# Patient Record
Sex: MALE | Race: WHITE | Employment: FULL TIME | URBAN - METROPOLITAN AREA
[De-identification: names, ages, dates, MRNs, and addresses within clinical notes are randomized per-mention and may not be internally consistent; named-entity substitution may affect disease eponyms.]

---

## 2017-01-20 PROBLEM — M47.812 CERVICAL SPONDYLOSIS WITHOUT MYELOPATHY: Status: ACTIVE | Noted: 2017-01-20

## 2017-01-20 PROBLEM — M96.1 POSTLAMINECTOMY SYNDROME, LUMBAR REGION: Status: ACTIVE | Noted: 2017-01-20

## 2017-04-06 PROBLEM — M47.817 LUMBOSACRAL SPONDYLOSIS WITHOUT MYELOPATHY: Status: ACTIVE | Noted: 2017-04-06

## 2017-12-02 PROBLEM — M50.30 DEGENERATIVE DISC DISEASE, CERVICAL: Status: ACTIVE | Noted: 2017-12-02

## 2017-12-02 PROBLEM — M48.02 FORAMINAL STENOSIS OF CERVICAL REGION: Status: ACTIVE | Noted: 2017-12-02

## 2021-06-21 DIAGNOSIS — M96.1 POSTLAMINECTOMY SYNDROME, LUMBAR REGION: ICD-10-CM

## 2021-06-21 DIAGNOSIS — M47.812 CERVICAL SPONDYLOSIS WITHOUT MYELOPATHY: ICD-10-CM

## 2021-06-21 DIAGNOSIS — M47.26 OSTEOARTHRITIS OF SPINE WITH RADICULOPATHY, LUMBAR REGION: ICD-10-CM

## 2021-06-21 DIAGNOSIS — G89.4 CHRONIC PAIN SYNDROME: ICD-10-CM

## 2021-06-21 DIAGNOSIS — M51.26 DISPLACEMENT OF LUMBAR INTERVERTEBRAL DISC WITHOUT MYELOPATHY: ICD-10-CM

## 2021-06-21 RX ORDER — GABAPENTIN 600 MG/1
600 TABLET ORAL DAILY
Qty: 90 TABLET | Refills: 0 | Status: SHIPPED | OUTPATIENT
Start: 2021-06-21 | End: 2021-08-20

## 2021-06-28 DIAGNOSIS — G89.4 CHRONIC PAIN SYNDROME: ICD-10-CM

## 2021-06-28 DIAGNOSIS — M47.26 OSTEOARTHRITIS OF SPINE WITH RADICULOPATHY, LUMBAR REGION: ICD-10-CM

## 2021-06-28 DIAGNOSIS — M47.812 CERVICAL SPONDYLOSIS WITHOUT MYELOPATHY: ICD-10-CM

## 2021-06-28 DIAGNOSIS — M51.26 DISPLACEMENT OF LUMBAR INTERVERTEBRAL DISC WITHOUT MYELOPATHY: ICD-10-CM

## 2021-06-28 DIAGNOSIS — M96.1 POSTLAMINECTOMY SYNDROME, LUMBAR REGION: ICD-10-CM

## 2021-06-28 RX ORDER — MELOXICAM 15 MG/1
15 TABLET ORAL DAILY
Qty: 30 TABLET | Refills: 0 | Status: SHIPPED | OUTPATIENT
Start: 2021-06-28 | End: 2021-07-28 | Stop reason: SDUPTHER

## 2021-06-28 RX ORDER — TRAMADOL HYDROCHLORIDE 50 MG/1
50 TABLET ORAL 2 TIMES DAILY PRN
Qty: 60 TABLET | Refills: 0 | Status: SHIPPED | OUTPATIENT
Start: 2021-06-28 | End: 2021-07-28 | Stop reason: SDUPTHER

## 2021-06-28 RX ORDER — OXYCODONE HYDROCHLORIDE AND ACETAMINOPHEN 5; 325 MG/1; MG/1
1 TABLET ORAL EVERY 8 HOURS PRN
Qty: 75 TABLET | Refills: 0 | Status: SHIPPED | OUTPATIENT
Start: 2021-06-28 | End: 2021-07-28 | Stop reason: SDUPTHER

## 2021-07-06 DIAGNOSIS — M96.1 POSTLAMINECTOMY SYNDROME, LUMBAR REGION: ICD-10-CM

## 2021-07-06 DIAGNOSIS — G89.4 CHRONIC PAIN SYNDROME: ICD-10-CM

## 2021-07-06 DIAGNOSIS — M47.812 CERVICAL SPONDYLOSIS WITHOUT MYELOPATHY: ICD-10-CM

## 2021-07-06 DIAGNOSIS — M47.26 OSTEOARTHRITIS OF SPINE WITH RADICULOPATHY, LUMBAR REGION: ICD-10-CM

## 2021-07-06 DIAGNOSIS — M51.26 DISPLACEMENT OF LUMBAR INTERVERTEBRAL DISC WITHOUT MYELOPATHY: ICD-10-CM

## 2021-07-06 RX ORDER — GABAPENTIN 600 MG/1
600 TABLET ORAL DAILY
Qty: 90 TABLET | Refills: 0 | Status: SHIPPED | OUTPATIENT
Start: 2021-07-06 | End: 2021-08-20 | Stop reason: SDUPTHER

## 2021-07-06 NOTE — TELEPHONE ENCOUNTER
Requested Prescriptions     Pending Prescriptions Disp Refills    gabapentin (NEURONTIN) 600 MG tablet 90 tablet 0     Sig: Take 1 tablet by mouth daily for 90 days.        Patient last seen on:  6/30/21  Date of last surgery:  na  Date of last refill:  6/21/21  Pain level:  na  Patient complaining of:  Pt request via [employer]  Future appts: 7/28/21

## 2021-07-06 NOTE — TELEPHONE ENCOUNTER
From: Aziza Stephens  To:  Office of DO Thais  Sent: 6/21/2021 9:05 AM EDT  Subject: Medication Renewal Request    Refills have been requested for the following medications:     gabapentin (NEURONTIN) 600 MG tablet DO Thais]    Preferred pharmacy: [address] [place] [address]

## 2021-07-28 ENCOUNTER — VIRTUAL VISIT (OUTPATIENT)
Dept: PAIN MANAGEMENT | Age: 56
End: 2021-07-28
Payer: COMMERCIAL

## 2021-07-28 DIAGNOSIS — M47.812 CERVICAL SPONDYLOSIS WITHOUT MYELOPATHY: ICD-10-CM

## 2021-07-28 DIAGNOSIS — M96.1 POSTLAMINECTOMY SYNDROME, LUMBAR REGION: ICD-10-CM

## 2021-07-28 DIAGNOSIS — M47.26 OSTEOARTHRITIS OF SPINE WITH RADICULOPATHY, LUMBAR REGION: ICD-10-CM

## 2021-07-28 DIAGNOSIS — M51.26 DISPLACEMENT OF LUMBAR INTERVERTEBRAL DISC WITHOUT MYELOPATHY: ICD-10-CM

## 2021-07-28 DIAGNOSIS — M51.36 DDD (DEGENERATIVE DISC DISEASE), LUMBAR: ICD-10-CM

## 2021-07-28 DIAGNOSIS — G89.4 CHRONIC PAIN SYNDROME: ICD-10-CM

## 2021-07-28 DIAGNOSIS — M47.817 LUMBOSACRAL SPONDYLOSIS WITHOUT MYELOPATHY: Primary | ICD-10-CM

## 2021-07-28 PROCEDURE — 99442 PR PHYS/QHP TELEPHONE EVALUATION 11-20 MIN: CPT | Performed by: PAIN MEDICINE

## 2021-07-28 RX ORDER — OXYCODONE HYDROCHLORIDE AND ACETAMINOPHEN 5; 325 MG/1; MG/1
1 TABLET ORAL EVERY 8 HOURS PRN
Qty: 75 TABLET | Refills: 0 | Status: SHIPPED | OUTPATIENT
Start: 2021-07-28 | End: 2021-08-20 | Stop reason: SDUPTHER

## 2021-07-28 RX ORDER — TRAMADOL HYDROCHLORIDE 50 MG/1
50 TABLET ORAL 2 TIMES DAILY PRN
Qty: 60 TABLET | Refills: 0 | Status: SHIPPED | OUTPATIENT
Start: 2021-07-28 | End: 2021-08-20 | Stop reason: SDUPTHER

## 2021-07-28 RX ORDER — MELOXICAM 15 MG/1
15 TABLET ORAL DAILY
Qty: 30 TABLET | Refills: 0 | Status: SHIPPED | OUTPATIENT
Start: 2021-07-28 | End: 2021-09-13 | Stop reason: SDUPTHER

## 2021-07-28 NOTE — PROGRESS NOTES
Ginette Escalante  (1965)    7/28/2021    TELEHEALTH EVALUATION -- Audio Only (During CWZVX-47 public health emergency)    Due to [place] outbreak, patient's office visit was converted to a virtual visit. Patient was contacted and agreed to proceed with a audio only telehealth service. Patient understands that this encounter is a billable visit and that insurance coverage and co-pays are up to their individual insurance plans. At the beginning of this telehealth encounter, I verified with the patient their name and date of birth. Verbal consent for telehealth encounter was obtained. Subjective:       Chief Complaint   Patient presents with    Back Pain    Neck Pain       Ginette Escalante is 60 y.o. male who complains today of:   Delene Boxer had a telehealth visit today. His back pain did flareup. His primary care provider put him on steroids which are helping. He is also seeing a chiropractor Dr. True Carlson. Patient has chronic pain. History of back in cervical fusions. Pain medicines help the help him work, do his activity living, chores around the house. History of interventional pain management. He does home exercises. He gets massage. Pain varies with overdoing it. Morning time is rough. Sleep can be impaired. No aberrant behavior or side effects with pain medications. Pain can be achy sharp sometimes with spasms. Plan: We discussed options. Continue Percocet, Ultram for his chronic pain. He has Neurontin for nerve pain. We will hold off on injections. We will see him 1 month for follow-up. Question answered, chart reviewed.       Allergies:  Tetanus toxoid    History:  Past Medical History:   Diagnosis Date    Chronic back pain      Past Surgical History:   Procedure Laterality Date    CERVICAL SPINE SURGERY  01/05/2018    SPINE SURGERY  9-2-15    LUMBAR     Family History   Problem Relation Age of Onset    Cancer Mother      Social History     Socioeconomic History    Marital status:  Spouse name: Not on file    Number of children: Not on file    Years of education: Not on file    Highest education level: Not on file   Occupational History    Not on file   Tobacco Use    Smoking status: Never Smoker    Smokeless tobacco: Never Used   Substance and Sexual Activity    Alcohol use: Yes     Alcohol/week: 0.0 standard drinks    Drug use: No    Sexual activity: Not on file   Other Topics Concern    Not on file   Social History Narrative    Not on file     Social Determinants of Health     Financial Resource Strain:     Difficulty of Paying Living Expenses:    Food Insecurity:     Worried About Running Out of Food in the Last Year:     [address] in the Last Year:    Transportation Needs:     Lack of Transportation (Medical):  Lack of Transportation (Non-Medical):    Physical Activity:     Days of Exercise per Week:     Minutes of Exercise per Session:    Stress:     Feeling of Stress :    Social Connections:     Frequency of Communication with Friends and Family:     Frequency of Social Gatherings with Friends and Family:     Attends Synagogue Services:     Active Member of Clubs or Organizations:     Attends Club or Organization Meetings:     Marital Status:    Intimate Partner Violence:     Fear of Current or Ex-Partner:     Emotionally Abused:     Physically Abused:     Sexually Abused:        Current Outpatient Medications on File Prior to Visit   Medication Sig Dispense Refill    gabapentin (NEURONTIN) 600 MG tablet Take 1 tablet by mouth daily for 90 days. 90 tablet 0    gabapentin (NEURONTIN) 600 MG tablet Take 1 tablet by mouth daily for 90 days. 90 tablet 0    naloxone 4 MG/0.1ML LIQD nasal spray 1 spray by Nasal route as needed for Opioid Reversal 1 each 0     No current facility-administered medications on file prior to visit. HPI    Review of Systems    Objective:     Vitals: There were no vitals taken for this visit.      PHYSICAL EXAMINATION:    [x] Alert  [x] Oriented to person/place/time  [x] No apparent distress      [x] Mood and affect normal  [x] Recent and remote memory intact  [x] Judgement and insight normal     [] OTHER:      Due to this being a TeleHealth encounter, evaluation of the following organ systems is limited: Vitals/Constitutional/EENT/Resp/CV/GI//MS/Neuro/Skin/Heme-Lymph-Imm. Assessment:      Diagnosis Orders   1. Lumbosacral spondylosis without myelopathy     2. Chronic pain syndrome  oxyCODONE-acetaminophen (PERCOCET) 5-325 MG per tablet    traMADol (ULTRAM) 50 MG tablet    meloxicam (MOBIC) 15 MG tablet   3. DDD (degenerative disc disease), lumbar     4. Cervical spondylosis without myelopathy  oxyCODONE-acetaminophen (PERCOCET) 5-325 MG per tablet    traMADol (ULTRAM) 50 MG tablet    meloxicam (MOBIC) 15 MG tablet   5. Postlaminectomy syndrome, lumbar region  oxyCODONE-acetaminophen (PERCOCET) 5-325 MG per tablet    traMADol (ULTRAM) 50 MG tablet    meloxicam (MOBIC) 15 MG tablet   6. Osteoarthritis of spine with radiculopathy, lumbar region  oxyCODONE-acetaminophen (PERCOCET) 5-325 MG per tablet    traMADol (ULTRAM) 50 MG tablet    meloxicam (MOBIC) 15 MG tablet   7. Displacement of lumbar intervertebral disc without myelopathy  oxyCODONE-acetaminophen (PERCOCET) 5-325 MG per tablet    traMADol (ULTRAM) 50 MG tablet    meloxicam (MOBIC) 15 MG tablet       Plan:          Orders Placed This Encounter   Medications    oxyCODONE-acetaminophen (PERCOCET) 5-325 MG per tablet     Sig: Take 1 tablet by mouth every 8 hours as needed for Pain for up to 30 days. Dispense:  75 tablet     Refill:  0     Reduce doses taken as pain becomes manageable    traMADol (ULTRAM) 50 MG tablet     Sig: Take 1 tablet by mouth 2 times daily as needed for Pain for up to 30 days.      Dispense:  60 tablet     Refill:  0     Reduce doses taken as pain becomes manageable    meloxicam (MOBIC) 15 MG tablet     Sig: Take 1 tablet by mouth daily TAKE 1 TABLET BY MOUTH EVERY DAY     Dispense:  30 tablet     Refill:  0       No orders of the defined types were placed in this encounter. Follow up:  No follow-ups on file. Charly Hernández DO      >50% of minute appointment was spent with discussion, addressing questions and concerns, including prognosis, treatment options, patient education, and recommendations. [id number]    Pursuant to the emergency declaration under the [address] [place] [address], [facility] waiver authority and the [employer] Act, this Virtual  Visit was conducted, with patient's consent, to reduce the patient's risk of exposure to COVID-19 and provide continuity of care for an established patient. Services were provided through an audio discussion to substitute for in-person clinic visit.

## 2021-08-19 ENCOUNTER — TELEPHONE (OUTPATIENT)
Dept: PAIN MANAGEMENT | Age: 56
End: 2021-08-19

## 2021-08-20 DIAGNOSIS — M51.26 DISPLACEMENT OF LUMBAR INTERVERTEBRAL DISC WITHOUT MYELOPATHY: ICD-10-CM

## 2021-08-20 DIAGNOSIS — M96.1 POSTLAMINECTOMY SYNDROME, LUMBAR REGION: ICD-10-CM

## 2021-08-20 DIAGNOSIS — M47.812 CERVICAL SPONDYLOSIS WITHOUT MYELOPATHY: ICD-10-CM

## 2021-08-20 DIAGNOSIS — G89.4 CHRONIC PAIN SYNDROME: ICD-10-CM

## 2021-08-20 DIAGNOSIS — M47.26 OSTEOARTHRITIS OF SPINE WITH RADICULOPATHY, LUMBAR REGION: ICD-10-CM

## 2021-08-20 RX ORDER — OXYCODONE HYDROCHLORIDE AND ACETAMINOPHEN 5; 325 MG/1; MG/1
1 TABLET ORAL EVERY 8 HOURS PRN
Qty: 75 TABLET | Refills: 0 | Status: SHIPPED | OUTPATIENT
Start: 2021-08-27 | End: 2021-09-23 | Stop reason: SDUPTHER

## 2021-08-20 RX ORDER — TRAMADOL HYDROCHLORIDE 50 MG/1
50 TABLET ORAL 2 TIMES DAILY PRN
Qty: 60 TABLET | Refills: 0 | Status: SHIPPED | OUTPATIENT
Start: 2021-08-27 | End: 2021-09-23 | Stop reason: SDUPTHER

## 2021-08-20 RX ORDER — GABAPENTIN 600 MG/1
600 TABLET ORAL DAILY
Qty: 30 TABLET | Refills: 0 | Status: SHIPPED | OUTPATIENT
Start: 2021-08-20 | End: 2021-09-20 | Stop reason: SDUPTHER

## 2021-08-20 NOTE — TELEPHONE ENCOUNTER
Requested Prescriptions     Pending Prescriptions Disp Refills    oxyCODONE-acetaminophen (PERCOCET) 5-325 MG per tablet 75 tablet 0     Sig: Take 1 tablet by mouth every 8 hours as needed for Pain for up to 30 days.  traMADol (ULTRAM) 50 MG tablet 60 tablet 0     Sig: Take 1 tablet by mouth 2 times daily as needed for Pain for up to 30 days.  gabapentin (NEURONTIN) 600 MG tablet 90 tablet 0     Sig: Take 1 tablet by mouth daily for 90 days.        Patient last seen on:  7/28  Date of last surgery:  n/a  Date of last refill:  7/28  Pain level:  n/a  Patient complaining of:  Pt was r/sed per our office, asking for refill  Future appts: 9/23

## 2021-09-13 DIAGNOSIS — G89.4 CHRONIC PAIN SYNDROME: ICD-10-CM

## 2021-09-13 DIAGNOSIS — M47.26 OSTEOARTHRITIS OF SPINE WITH RADICULOPATHY, LUMBAR REGION: ICD-10-CM

## 2021-09-13 DIAGNOSIS — M47.812 CERVICAL SPONDYLOSIS WITHOUT MYELOPATHY: ICD-10-CM

## 2021-09-13 DIAGNOSIS — M51.26 DISPLACEMENT OF LUMBAR INTERVERTEBRAL DISC WITHOUT MYELOPATHY: ICD-10-CM

## 2021-09-13 DIAGNOSIS — M96.1 POSTLAMINECTOMY SYNDROME, LUMBAR REGION: ICD-10-CM

## 2021-09-13 RX ORDER — MELOXICAM 15 MG/1
15 TABLET ORAL DAILY
Qty: 30 TABLET | Refills: 0 | Status: SHIPPED | OUTPATIENT
Start: 2021-09-13 | End: 2021-10-13 | Stop reason: SDUPTHER

## 2021-09-20 DIAGNOSIS — M96.1 POSTLAMINECTOMY SYNDROME, LUMBAR REGION: ICD-10-CM

## 2021-09-20 DIAGNOSIS — M47.812 CERVICAL SPONDYLOSIS WITHOUT MYELOPATHY: ICD-10-CM

## 2021-09-20 DIAGNOSIS — G89.4 CHRONIC PAIN SYNDROME: ICD-10-CM

## 2021-09-20 DIAGNOSIS — M47.26 OSTEOARTHRITIS OF SPINE WITH RADICULOPATHY, LUMBAR REGION: ICD-10-CM

## 2021-09-20 DIAGNOSIS — M51.26 DISPLACEMENT OF LUMBAR INTERVERTEBRAL DISC WITHOUT MYELOPATHY: ICD-10-CM

## 2021-09-20 RX ORDER — GABAPENTIN 600 MG/1
600 TABLET ORAL DAILY
Qty: 30 TABLET | Refills: 0 | Status: SHIPPED | OUTPATIENT
Start: 2021-09-20 | End: 2022-01-27 | Stop reason: SDUPTHER

## 2021-09-23 ENCOUNTER — OFFICE VISIT (OUTPATIENT)
Dept: PAIN MANAGEMENT | Age: 56
End: 2021-09-23
Payer: COMMERCIAL

## 2021-09-23 VITALS — HEIGHT: 70 IN | BODY MASS INDEX: 24.05 KG/M2 | TEMPERATURE: 97.7 F | WEIGHT: 168 LBS

## 2021-09-23 DIAGNOSIS — G89.4 CHRONIC PAIN SYNDROME: ICD-10-CM

## 2021-09-23 DIAGNOSIS — M47.812 CERVICAL SPONDYLOSIS WITHOUT MYELOPATHY: ICD-10-CM

## 2021-09-23 DIAGNOSIS — M47.26 OSTEOARTHRITIS OF SPINE WITH RADICULOPATHY, LUMBAR REGION: ICD-10-CM

## 2021-09-23 DIAGNOSIS — M96.1 POSTLAMINECTOMY SYNDROME, LUMBAR REGION: ICD-10-CM

## 2021-09-23 PROCEDURE — G8427 DOCREV CUR MEDS BY ELIG CLIN: HCPCS | Performed by: NURSE PRACTITIONER

## 2021-09-23 PROCEDURE — G8420 CALC BMI NORM PARAMETERS: HCPCS | Performed by: NURSE PRACTITIONER

## 2021-09-23 PROCEDURE — 99214 OFFICE O/P EST MOD 30 MIN: CPT | Performed by: NURSE PRACTITIONER

## 2021-09-23 PROCEDURE — 3017F COLORECTAL CA SCREEN DOC REV: CPT | Performed by: NURSE PRACTITIONER

## 2021-09-23 PROCEDURE — 1036F TOBACCO NON-USER: CPT | Performed by: NURSE PRACTITIONER

## 2021-09-23 RX ORDER — OXYCODONE HYDROCHLORIDE AND ACETAMINOPHEN 5; 325 MG/1; MG/1
1 TABLET ORAL EVERY 8 HOURS PRN
Qty: 75 TABLET | Refills: 0 | Status: SHIPPED | OUTPATIENT
Start: 2021-09-26 | End: 2021-10-27 | Stop reason: SDUPTHER

## 2021-09-23 RX ORDER — TRAMADOL HYDROCHLORIDE 50 MG/1
50 TABLET ORAL 2 TIMES DAILY PRN
Qty: 60 TABLET | Refills: 0 | Status: SHIPPED | OUTPATIENT
Start: 2021-09-26 | End: 2021-10-27 | Stop reason: SDUPTHER

## 2021-09-23 ASSESSMENT — ENCOUNTER SYMPTOMS
BACK PAIN: 1
BLOOD IN STOOL: 0
SHORTNESS OF BREATH: 0

## 2021-09-23 NOTE — PROGRESS NOTES
Soren Greenwood  (1965)    9/23/2021    Subjective:     Soren Greenwood is 60 y.o. male who complains today of:    Chief Complaint   Patient presents with    Back Pain     lower left    Leg Pain     left         Allergies:  Tetanus toxoid    Past Medical History:   Diagnosis Date    Chronic back pain      Past Surgical History:   Procedure Laterality Date    CERVICAL SPINE SURGERY  01/05/2018    SPINE SURGERY  9-2-15    LUMBAR     Family History   Problem Relation Age of Onset    Cancer Mother      Social History     Socioeconomic History    Marital status:      Spouse name: Not on file    Number of children: Not on file    Years of education: Not on file    Highest education level: Not on file   Occupational History    Not on file   Tobacco Use    Smoking status: Never Smoker    Smokeless tobacco: Never Used   Substance and Sexual Activity    Alcohol use: Yes     Alcohol/week: 0.0 standard drinks    Drug use: No    Sexual activity: Not on file   Other Topics Concern    Not on file   Social History Narrative    Not on file     Social Determinants of Health     Financial Resource Strain:     Difficulty of Paying Living Expenses:    Food Insecurity:     Worried About Running Out of Food in the Last Year:     [address] in the Last Year:    Transportation Needs:     Lack of Transportation (Medical):      Lack of Transportation (Non-Medical):    Physical Activity:     Days of Exercise per Week:     Minutes of Exercise per Session:    Stress:     Feeling of Stress :    Social Connections:     Frequency of Communication with Friends and Family:     Frequency of Social Gatherings with Friends and Family:     Attends [employer] Services:     Active Member of Clubs or Organizations:     Attends Club or Organization Meetings:     Marital Status:    Intimate Partner Violence:     Fear of Current or Ex-Partner:     Emotionally Abused:     Physically Abused:     Sexually Abused: treatment. , Random urine drug screen sent today. (DELBERT Melara - CNP)    Orders Placed This Encounter   Medications    oxyCODONE-acetaminophen (PERCOCET) 5-325 MG per tablet     Sig: Take 1 tablet by mouth every 8 hours as needed for Pain for up to 30 days. Dispense:  75 tablet     Refill:  0     Reduce doses taken as pain becomes manageable    traMADol (ULTRAM) 50 MG tablet     Sig: Take 1 tablet by mouth 2 times daily as needed for Pain for up to 30 days. Fill 9/26/21     Dispense:  60 tablet     Refill:  0     Reduce doses taken as pain becomes manageable       Orders Placed This Encounter   Procedures    Urine Drug Screen     Chronic pain/illicits     Discussed options with the patient today. No injections needed at this time. Seeing Dr. Grace Quispe for hand/arm pain and injections. He would like to get set up with a new neurosurgeon in the near future. Will sign updated NDP. He will call in for partial refill of gabapentin in a few weeks to get meds due on same date. Routine UDS. Took 1 Percocet last night and 1 Tramadol today. All questions were answered. Pt verbalized understanding and agrees with above plan. Narcan sent 5/14/21. Will continue medications for chronic pain as they help pt function with ADL and improve quality of life. Discussed possible risks of opiate medication with pt, including but not limited to, constipation, nausea or vomiting, sedation, urinary retention, dependence and possible addiction. Pt agrees to use medication as directed. Pt advised to not use opiates while driving or operating heavy equipment, or in situations where pt may harm him/herself or others. Pt is aware that while on narcotics, pt needs to be seen monthly to reassess pain and need for continued medication. NDP reviewed. OARRS was reviewed. This NP saw pt under direct supervision of Dr. Minda Guardado.        Follow up:  Return in about 4 weeks (around 10/21/2021) for review meds and reassess pain.    Maria Del Carmen Loaiza, APRN - CNP

## 2021-10-13 DIAGNOSIS — M47.26 OSTEOARTHRITIS OF SPINE WITH RADICULOPATHY, LUMBAR REGION: ICD-10-CM

## 2021-10-13 DIAGNOSIS — G89.4 CHRONIC PAIN SYNDROME: ICD-10-CM

## 2021-10-13 DIAGNOSIS — M96.1 POSTLAMINECTOMY SYNDROME, LUMBAR REGION: ICD-10-CM

## 2021-10-13 DIAGNOSIS — M51.26 DISPLACEMENT OF LUMBAR INTERVERTEBRAL DISC WITHOUT MYELOPATHY: ICD-10-CM

## 2021-10-13 DIAGNOSIS — M47.812 CERVICAL SPONDYLOSIS WITHOUT MYELOPATHY: ICD-10-CM

## 2021-10-13 RX ORDER — MELOXICAM 15 MG/1
15 TABLET ORAL DAILY
Qty: 13 TABLET | Refills: 0 | Status: SHIPPED | OUTPATIENT
Start: 2021-10-13 | End: 2021-10-27 | Stop reason: SDUPTHER

## 2021-10-27 DIAGNOSIS — M47.812 CERVICAL SPONDYLOSIS WITHOUT MYELOPATHY: ICD-10-CM

## 2021-10-27 DIAGNOSIS — M47.26 OSTEOARTHRITIS OF SPINE WITH RADICULOPATHY, LUMBAR REGION: ICD-10-CM

## 2021-10-27 DIAGNOSIS — G89.4 CHRONIC PAIN SYNDROME: ICD-10-CM

## 2021-10-27 DIAGNOSIS — M51.26 DISPLACEMENT OF LUMBAR INTERVERTEBRAL DISC WITHOUT MYELOPATHY: ICD-10-CM

## 2021-10-27 DIAGNOSIS — M96.1 POSTLAMINECTOMY SYNDROME, LUMBAR REGION: ICD-10-CM

## 2021-10-27 NOTE — TELEPHONE ENCOUNTER
Needs to be seen - needs f/u appointment - appears missed last appointment? . Send refill request to [facility]

## 2021-10-28 RX ORDER — TRAMADOL HYDROCHLORIDE 50 MG/1
50 TABLET ORAL 2 TIMES DAILY PRN
Qty: 60 TABLET | Refills: 0 | Status: SHIPPED | OUTPATIENT
Start: 2021-10-28 | End: 2021-11-18 | Stop reason: SDUPTHER

## 2021-10-28 RX ORDER — OXYCODONE HYDROCHLORIDE AND ACETAMINOPHEN 5; 325 MG/1; MG/1
1 TABLET ORAL EVERY 8 HOURS PRN
Qty: 75 TABLET | Refills: 0 | Status: SHIPPED | OUTPATIENT
Start: 2021-10-28 | End: 2021-11-18 | Stop reason: SDUPTHER

## 2021-10-28 RX ORDER — MELOXICAM 15 MG/1
15 TABLET ORAL DAILY
Qty: 13 TABLET | Refills: 0 | Status: SHIPPED | OUTPATIENT
Start: 2021-10-28 | End: 2021-11-11 | Stop reason: SDUPTHER

## 2021-11-11 DIAGNOSIS — M96.1 POSTLAMINECTOMY SYNDROME, LUMBAR REGION: ICD-10-CM

## 2021-11-11 DIAGNOSIS — M47.812 CERVICAL SPONDYLOSIS WITHOUT MYELOPATHY: ICD-10-CM

## 2021-11-11 DIAGNOSIS — M51.26 DISPLACEMENT OF LUMBAR INTERVERTEBRAL DISC WITHOUT MYELOPATHY: ICD-10-CM

## 2021-11-11 DIAGNOSIS — M47.26 OSTEOARTHRITIS OF SPINE WITH RADICULOPATHY, LUMBAR REGION: ICD-10-CM

## 2021-11-11 DIAGNOSIS — G89.4 CHRONIC PAIN SYNDROME: ICD-10-CM

## 2021-11-11 RX ORDER — MELOXICAM 15 MG/1
15 TABLET ORAL DAILY
Qty: 30 TABLET | Refills: 2 | Status: SHIPPED | OUTPATIENT
Start: 2021-11-11 | End: 2022-01-27

## 2021-11-18 ENCOUNTER — VIRTUAL VISIT (OUTPATIENT)
Dept: PAIN MANAGEMENT | Age: 56
End: 2021-11-18
Payer: COMMERCIAL

## 2021-11-18 DIAGNOSIS — M51.36 DDD (DEGENERATIVE DISC DISEASE), LUMBAR: ICD-10-CM

## 2021-11-18 DIAGNOSIS — M47.812 CERVICAL SPONDYLOSIS WITHOUT MYELOPATHY: ICD-10-CM

## 2021-11-18 DIAGNOSIS — M51.26 DISPLACEMENT OF LUMBAR INTERVERTEBRAL DISC WITHOUT MYELOPATHY: ICD-10-CM

## 2021-11-18 DIAGNOSIS — G89.4 CHRONIC PAIN SYNDROME: Primary | ICD-10-CM

## 2021-11-18 DIAGNOSIS — M47.26 OSTEOARTHRITIS OF SPINE WITH RADICULOPATHY, LUMBAR REGION: ICD-10-CM

## 2021-11-18 DIAGNOSIS — M96.1 POSTLAMINECTOMY SYNDROME, LUMBAR REGION: ICD-10-CM

## 2021-11-18 DIAGNOSIS — M47.817 LUMBOSACRAL SPONDYLOSIS WITHOUT MYELOPATHY: ICD-10-CM

## 2021-11-18 PROCEDURE — 99442 PR PHYS/QHP TELEPHONE EVALUATION 11-20 MIN: CPT | Performed by: PAIN MEDICINE

## 2021-11-18 RX ORDER — TRAMADOL HYDROCHLORIDE 50 MG/1
50 TABLET ORAL 2 TIMES DAILY PRN
Qty: 60 TABLET | Refills: 0 | Status: SHIPPED | OUTPATIENT
Start: 2021-11-18 | End: 2021-12-30 | Stop reason: SDUPTHER

## 2021-11-18 RX ORDER — OXYCODONE HYDROCHLORIDE AND ACETAMINOPHEN 5; 325 MG/1; MG/1
1 TABLET ORAL EVERY 8 HOURS PRN
Qty: 75 TABLET | Refills: 0 | Status: SHIPPED | OUTPATIENT
Start: 2021-11-18 | End: 2021-12-30 | Stop reason: SDUPTHER

## 2021-11-18 RX ORDER — TIZANIDINE 4 MG/1
4 TABLET ORAL 2 TIMES DAILY PRN
Qty: 60 TABLET | Refills: 2 | Status: SHIPPED | OUTPATIENT
Start: 2021-11-18 | End: 2022-02-16

## 2021-11-18 NOTE — PROGRESS NOTES
Problem Relation Age of Onset    Cancer Mother      Social History     Socioeconomic History    Marital status:      Spouse name: Not on file    Number of children: Not on file    Years of education: Not on file    Highest education level: Not on file   Occupational History    Not on file   Tobacco Use    Smoking status: Never Smoker    Smokeless tobacco: Never Used   Substance and Sexual Activity    Alcohol use: Yes     Alcohol/week: 0.0 standard drinks    Drug use: No    Sexual activity: Not on file   Other Topics Concern    Not on file   Social History Narrative    Not on file     Social Determinants of Health     Financial Resource Strain:     Difficulty of Paying Living Expenses: Not on file   Food Insecurity:     Worried About Running Out of Food in the Last Year: Not on file    [place] [employer] in the Last Year: Not on file   Transportation Needs:     Lack of Transportation (Medical): Not on file    Lack of Transportation (Non-Medical):  Not on file   Physical Activity:     Days of Exercise per Week: Not on file    Minutes of Exercise per Session: Not on file   Stress:     Feeling of Stress : Not on file   Social Connections:     Frequency of Communication with Friends and Family: Not on file    Frequency of Social Gatherings with Friends and Family: Not on file    Attends Baptism Services: Not on file    Active Member of [address] [place] [address] [employer] or Organizations: Not on file    Attends Club or Organization Meetings: Not on file    Marital Status: Not on file   Intimate Partner Violence:     Fear of Current or Ex-Partner: Not on file    Emotionally Abused: Not on file    Physically Abused: Not on file    Sexually Abused: Not on file   Housing Stability:     Unable to Pay for Housing in the Last Year: Not on file    Number of [place] in the Last Year: Not on file    Unstable Housing in the Last Year: Not on file       Current Outpatient Medications on File Prior to Visit Medication Sig Dispense Refill    meloxicam (MOBIC) 15 MG tablet Take 1 tablet by mouth daily TAKE 1 TABLET BY MOUTH EVERY DAY 30 tablet 2    gabapentin (NEURONTIN) 600 MG tablet Take 1 tablet by mouth daily for 30 days. 30 tablet 0    naloxone 4 MG/0.1ML LIQD nasal spray 1 spray by Nasal route as needed for Opioid Reversal 1 each 0     No current facility-administered medications on file prior to visit. HPI    Review of Systems    Objective:     Vitals: There were no vitals taken for this visit. PHYSICAL EXAMINATION:    [x] Alert  [x] Oriented to person/place/time  [x] No apparent distress      [x] Mood and affect normal  [x] Recent and remote memory intact  [x] Judgement and insight normal     [] OTHER:      Due to this being a TeleHealth encounter, evaluation of the following organ systems is limited: Vitals/Constitutional/EENT/Resp/CV/GI//MS/Neuro/Skin/Heme-Lymph-Imm. Assessment:      Diagnosis Orders   1. Chronic pain syndrome  oxyCODONE-acetaminophen (PERCOCET) 5-325 MG per tablet    traMADol (ULTRAM) 50 MG tablet    tiZANidine (ZANAFLEX) 4 MG tablet   2. Lumbosacral spondylosis without myelopathy     3. Cervical spondylosis without myelopathy  oxyCODONE-acetaminophen (PERCOCET) 5-325 MG per tablet    traMADol (ULTRAM) 50 MG tablet    tiZANidine (ZANAFLEX) 4 MG tablet   4. DDD (degenerative disc disease), lumbar     5. Postlaminectomy syndrome, lumbar region  oxyCODONE-acetaminophen (PERCOCET) 5-325 MG per tablet    traMADol (ULTRAM) 50 MG tablet    tiZANidine (ZANAFLEX) 4 MG tablet   6. Osteoarthritis of spine with radiculopathy, lumbar region  oxyCODONE-acetaminophen (PERCOCET) 5-325 MG per tablet    traMADol (ULTRAM) 50 MG tablet    tiZANidine (ZANAFLEX) 4 MG tablet   7.  Displacement of lumbar intervertebral disc without myelopathy  tiZANidine (ZANAFLEX) 4 MG tablet       Plan:          Orders Placed This Encounter   Medications    oxyCODONE-acetaminophen (PERCOCET) 5-325 MG per tablet     Sig: Take 1 tablet by mouth every 8 hours as needed for Pain for up to 30 days. Dispense:  75 tablet     Refill:  0     Reduce doses taken as pain becomes manageable    traMADol (ULTRAM) 50 MG tablet     Sig: Take 1 tablet by mouth 2 times daily as needed for Pain for up to 30 days. Fill 9/26/21     Dispense:  60 tablet     Refill:  0     Reduce doses taken as pain becomes manageable    tiZANidine (ZANAFLEX) 4 MG tablet     Sig: Take 1 tablet by mouth 2 times daily as needed (spasms)     Dispense:  60 tablet     Refill:  2       No orders of the defined types were placed in this encounter. Follow up:  No follow-ups on file. Elsa Bellamy DO      >50% of minute appointment was spent with discussion, addressing questions and concerns, including prognosis, treatment options, patient education, and recommendations. [id number]    Pursuant to the emergency declaration under the [facility], [facility] waiver authority and the [employer] Act, this Virtual  Visit was conducted, with patient's consent, to reduce the patient's risk of exposure to COVID-19 and provide continuity of care for an established patient. Services were provided through an audio discussion to substitute for in-person clinic visit.

## 2021-12-02 ENCOUNTER — PROCEDURE VISIT (OUTPATIENT)
Dept: PAIN MANAGEMENT | Age: 56
End: 2021-12-02
Payer: COMMERCIAL

## 2021-12-02 DIAGNOSIS — M47.817 LUMBOSACRAL SPONDYLOSIS WITHOUT MYELOPATHY: Primary | ICD-10-CM

## 2021-12-02 PROCEDURE — 64493 INJ PARAVERT F JNT L/S 1 LEV: CPT | Performed by: PAIN MEDICINE

## 2021-12-02 PROCEDURE — 64494 INJ PARAVERT F JNT L/S 2 LEV: CPT | Performed by: PAIN MEDICINE

## 2021-12-02 RX ORDER — LIDOCAINE HYDROCHLORIDE 10 MG/ML
10 INJECTION, SOLUTION EPIDURAL; INFILTRATION; INTRACAUDAL; PERINEURAL ONCE
Status: COMPLETED | OUTPATIENT
Start: 2021-12-02 | End: 2021-12-02

## 2021-12-02 RX ORDER — BETAMETHASONE SODIUM PHOSPHATE AND BETAMETHASONE ACETATE 3; 3 MG/ML; MG/ML
6 INJECTION, SUSPENSION INTRA-ARTICULAR; INTRALESIONAL; INTRAMUSCULAR; SOFT TISSUE ONCE
Status: COMPLETED | OUTPATIENT
Start: 2021-12-02 | End: 2021-12-02

## 2021-12-02 RX ADMIN — BETAMETHASONE SODIUM PHOSPHATE AND BETAMETHASONE ACETATE 6 MG: 3; 3 INJECTION, SUSPENSION INTRA-ARTICULAR; INTRALESIONAL; INTRAMUSCULAR; SOFT TISSUE at 08:46

## 2021-12-02 RX ADMIN — LIDOCAINE HYDROCHLORIDE 10 MG: 10 INJECTION, SOLUTION EPIDURAL; INFILTRATION; INTRACAUDAL; PERINEURAL at 08:45

## 2021-12-02 NOTE — PROGRESS NOTES
[facility]  Neurosurgery and Pain [address], [address] [facility] , [address] [facility], [place]: [phone]  F: [phone]       LUMBAR FACET JOINT INJECTION       Provider: Pat Navas DO          Patient Name: Prateek Ward : 1965        Date: 2021        Prateek Ward is here today for interventional pain management. SLR negative. Tender along lumber facet joints with pain and decreased ROM. Extension and rotation with muscle spasms. Preoperatively, the patient presents with facet joint mediated pain as per history and exam. Patient has failed NSAIDs, PT, and conservative treatment. Patient has significant psychological and functional impairment due to these conditions. Standard ASIPP guidelines were followed and sterile technique used. Area was cleaned with Betadine x3. Informed consent was obtained. Fluoroscopic guidance was used for this procedure. Appropriate oblique views were used to open up the facet joints. Appropriate length spinal needle was advanced to each facet joint. Negative aspiration was achieved. Approximately 6mg Celestone was divided equally and 0.5 cc of 1% preservative free Lidocaine was injected in the joints injected without difficulty. Patient tolerated the procedure well, no obvious complications occurred during the procedure. Patient was appropriately monitored and discharged home in stable condition with their usual motor strength. Post Op instructions were given to patient. Patient told to resume blood thinners if they stopped them prior to procedure. Relevent and recent imaging reviewed with patient today.            [x] Bilateral [] T12-L1       [] L1-2      [] Right [] L2-3       [] L3-4      [] Left [x] L4-5         [x] L5-S1                           Physician: Pat Navas DO

## 2021-12-29 ENCOUNTER — PATIENT MESSAGE (OUTPATIENT)
Dept: PAIN MANAGEMENT | Age: 56
End: 2021-12-29

## 2021-12-29 DIAGNOSIS — G89.4 CHRONIC PAIN SYNDROME: ICD-10-CM

## 2021-12-29 DIAGNOSIS — M47.26 OSTEOARTHRITIS OF SPINE WITH RADICULOPATHY, LUMBAR REGION: ICD-10-CM

## 2021-12-29 DIAGNOSIS — M47.812 CERVICAL SPONDYLOSIS WITHOUT MYELOPATHY: ICD-10-CM

## 2021-12-29 DIAGNOSIS — M96.1 POSTLAMINECTOMY SYNDROME, LUMBAR REGION: ICD-10-CM

## 2021-12-29 NOTE — TELEPHONE ENCOUNTER
From: Praveen Cage  To: Dr. Seo Self: 12/29/2021 11:14 AM EST  Subject: Meds    good morning  My appointment was cancelled, which is ok, but my meds were not filled.   Tramadol and percocet and I am out  Thanks  [facility]

## 2021-12-29 NOTE — TELEPHONE ENCOUNTER
Requested Prescriptions     Pending Prescriptions Disp Refills    traMADol (ULTRAM) 50 MG tablet 60 tablet 0     Sig: Take 1 tablet by mouth 2 times daily as needed for Pain for up to 30 days. Fill 9/26/21    oxyCODONE-acetaminophen (PERCOCET) 5-325 MG per tablet 75 tablet 0     Sig: Take 1 tablet by mouth every 8 hours as needed for Pain for up to 30 days. Patient last seen on:  12/16/21  Date of last surgery:  NA  Date of last refill:  11/18/21  Pain level:  NA  Patient complaining of:  PT REQUEST VIA ebindle  Future appts: NO APPT.

## 2021-12-30 RX ORDER — OXYCODONE HYDROCHLORIDE AND ACETAMINOPHEN 5; 325 MG/1; MG/1
1 TABLET ORAL EVERY 8 HOURS PRN
Qty: 75 TABLET | Refills: 0 | Status: SHIPPED | OUTPATIENT
Start: 2021-12-30 | End: 2022-01-27 | Stop reason: SDUPTHER

## 2021-12-30 RX ORDER — TRAMADOL HYDROCHLORIDE 50 MG/1
50 TABLET ORAL 2 TIMES DAILY PRN
Qty: 60 TABLET | Refills: 0 | Status: SHIPPED | OUTPATIENT
Start: 2021-12-30 | End: 2022-01-27 | Stop reason: SDUPTHER

## 2022-01-27 ENCOUNTER — OFFICE VISIT (OUTPATIENT)
Dept: PAIN MANAGEMENT | Age: 57
End: 2022-01-27
Payer: COMMERCIAL

## 2022-01-27 VITALS
TEMPERATURE: 96.7 F | DIASTOLIC BLOOD PRESSURE: 79 MMHG | HEART RATE: 88 BPM | SYSTOLIC BLOOD PRESSURE: 116 MMHG | HEIGHT: 70 IN | BODY MASS INDEX: 24.05 KG/M2 | WEIGHT: 168 LBS

## 2022-01-27 DIAGNOSIS — M47.812 CERVICAL SPONDYLOSIS WITHOUT MYELOPATHY: ICD-10-CM

## 2022-01-27 DIAGNOSIS — M47.817 LUMBOSACRAL SPONDYLOSIS WITHOUT MYELOPATHY: Primary | ICD-10-CM

## 2022-01-27 DIAGNOSIS — M96.1 POSTLAMINECTOMY SYNDROME, LUMBAR REGION: ICD-10-CM

## 2022-01-27 DIAGNOSIS — G89.4 CHRONIC PAIN SYNDROME: ICD-10-CM

## 2022-01-27 PROCEDURE — 1036F TOBACCO NON-USER: CPT | Performed by: NURSE PRACTITIONER

## 2022-01-27 PROCEDURE — G8420 CALC BMI NORM PARAMETERS: HCPCS | Performed by: NURSE PRACTITIONER

## 2022-01-27 PROCEDURE — G8427 DOCREV CUR MEDS BY ELIG CLIN: HCPCS | Performed by: NURSE PRACTITIONER

## 2022-01-27 PROCEDURE — 3017F COLORECTAL CA SCREEN DOC REV: CPT | Performed by: NURSE PRACTITIONER

## 2022-01-27 PROCEDURE — G8484 FLU IMMUNIZE NO ADMIN: HCPCS | Performed by: NURSE PRACTITIONER

## 2022-01-27 PROCEDURE — 99214 OFFICE O/P EST MOD 30 MIN: CPT | Performed by: NURSE PRACTITIONER

## 2022-01-27 RX ORDER — GABAPENTIN 600 MG/1
600 TABLET ORAL DAILY
Qty: 19 TABLET | Refills: 0 | Status: SHIPPED | OUTPATIENT
Start: 2022-02-09 | End: 2022-02-21 | Stop reason: SDUPTHER

## 2022-01-27 RX ORDER — OXYCODONE HYDROCHLORIDE AND ACETAMINOPHEN 5; 325 MG/1; MG/1
1 TABLET ORAL EVERY 8 HOURS PRN
Qty: 75 TABLET | Refills: 0 | Status: SHIPPED | OUTPATIENT
Start: 2022-01-29 | End: 2022-02-21 | Stop reason: SDUPTHER

## 2022-01-27 RX ORDER — TRAMADOL HYDROCHLORIDE 50 MG/1
50 TABLET ORAL 2 TIMES DAILY PRN
Qty: 60 TABLET | Refills: 0 | Status: SHIPPED | OUTPATIENT
Start: 2022-01-29 | End: 2022-02-21 | Stop reason: SDUPTHER

## 2022-01-27 ASSESSMENT — ENCOUNTER SYMPTOMS
BLOOD IN STOOL: 0
SHORTNESS OF BREATH: 0
BACK PAIN: 1

## 2022-01-27 NOTE — PROGRESS NOTES
Raina Rogers  (1965)    1/27/2022    Subjective:     Raina Rogers is 60 y.o. male who complains today of:    Chief Complaint   Patient presents with    Back Pain     Lower    Medication Refill         Allergies:  Tetanus toxoid    Past Medical History:   Diagnosis Date    Chronic back pain      Past Surgical History:   Procedure Laterality Date    CERVICAL SPINE SURGERY  01/05/2018    SPINE SURGERY  9-2-15    LUMBAR     Family History   Problem Relation Age of Onset    Cancer Mother      Social History     Socioeconomic History    Marital status:      Spouse name: Not on file    Number of children: Not on file    Years of education: Not on file    Highest education level: Not on file   Occupational History    Not on file   Tobacco Use    Smoking status: Never Smoker    Smokeless tobacco: Never Used   Substance and Sexual Activity    Alcohol use: Yes     Alcohol/week: 0.0 standard drinks    Drug use: No    Sexual activity: Not on file   Other Topics Concern    Not on file   Social History Narrative    Not on file     Social Determinants of Health     Financial Resource Strain:     Difficulty of Paying Living Expenses: Not on file   Food Insecurity:     Worried About Running Out of Food in the Last Year: Not on file    Hugo of Food in the Last Year: Not on file   Transportation Needs:     Lack of Transportation (Medical): Not on file    Lack of Transportation (Non-Medical):  Not on file   Physical Activity:     Days of Exercise per Week: Not on file    Minutes of Exercise per Session: Not on file   Stress:     Feeling of Stress : Not on file   Social Connections:     Frequency of Communication with Friends and Family: Not on file    Frequency of Social Gatherings with Friends and Family: Not on file    Attends [employer] Services: Not on file    Active Member of Clubs or Organizations: Not on file    Attends Club or Organization Meetings: Not on file    Marital Status: Not on file   Intimate Partner Violence:     Fear of Current or Ex-Partner: Not on file    Emotionally Abused: Not on file    Physically Abused: Not on file    Sexually Abused: Not on file   Housing Stability:     Unable to Pay for Housing in the Last Year: Not on file    Number of [place] in the Last Year: Not on file    Unstable Housing in the Last Year: Not on file       Current Outpatient Medications on File Prior to Visit   Medication Sig Dispense Refill    tiZANidine (ZANAFLEX) 4 MG tablet Take 1 tablet by mouth 2 times daily as needed (spasms) 60 tablet 2    naloxone 4 MG/0.1ML LIQD nasal spray 1 spray by Nasal route as needed for Opioid Reversal 1 each 0     No current facility-administered medications on file prior to visit. Pt presents today for a f/u of chronic neck and low back pain. He had a left middle finger fusion on 12/8 but having a long healing process. He stopped taking the Meloxicam per Dr. Karen Posey. The lumbar injections did not help as much as in the past.  Pt feels pain level and functioning improves with prescribed medications and is able to perform ADLs. Pt feels that prolonged sitting aggravates the pain. He was seeing chiropractor 2x/week for 6 weeks then stopped going, feels it helped. Pt c/o LLE radiating numbness and tingling down to the foot, worse when sitting too long. ACDF C4-5, C5-6 with Dr Parveen Mcduffie on 1/5/18. He has a history of multiple back surgeries and fusion at L2-3. He gets neuropathy worse at night. L CTR with Dr. Karen Posey January 2020. Taking Percocet, tramadol, Neurontin for chronic pain. 12/2/21 BL L4-5, 5-S1 facet inj  4/1/2021 bilateral L3-4, 4-5, 5-S1 facet injection  11/2/20 BL L3-4, 4-5, 5-S1 facet inj  7/20/20 BL L4-5 MARCIA  5/12/20 BL L3-4, 4-5, 5-S1 facet inj  1/20/20 L L3-4, 4-5, 5-S1 facet inj  1/17/20 L shoulder inj      Review of Systems   Constitutional: Negative for appetite change and fever. HENT: Negative for hearing loss. Eyes: Negative for visual disturbance. Respiratory: Negative for shortness of breath. Gastrointestinal: Negative for blood in stool. Genitourinary: Negative for difficulty urinating and hematuria. Musculoskeletal: Positive for arthralgias, back pain and neck pain. Skin: Negative for rash. Neurological: Negative for facial asymmetry. Hematological: Negative for adenopathy. Psychiatric/Behavioral: Negative for self-injury. All other systems reviewed and are negative. Objective:     Vitals:  /79   Pulse 88   Temp 96.7 °F (35.9 °C)   Ht 5' 10\" (1.778 m)   Wt 168 lb (76.2 kg)   BMI 24.11 kg/m² Pain Score:   2      Physical Exam  Vitals and nursing note reviewed. Pt is alert and oriented x 3. Recent and remote memory is intact. Mood, judgement and affect are normal.  No signs of distress or SOB noted. Visualized skin intact. Sensation intact to light touch. Decreased ROM with flexion and extension of low back. Tender with palpation to bilateral lumbar spine with positive provacative maneuvers noted. Negative SLR. Strength, balance, and coordination are functional for ambulation. Decreased ROM with flexion, extension and rotation of cervical spine. Muscle tightness noted. Positive bilateral cervical facet joint provacative with palpation. Negative Spurling's maneuver. Negative Deon's sign. Strong grasp B/L. Strength is functional in UE bilaterally. Pulses are intact. Patient guarding left hand in gauze wrap. Assessment:      Diagnosis Orders   1. Lumbosacral spondylosis without myelopathy  MS INJ DX/THER AGNT PARAVERT FACET JOINT, LUMBAR/SAC, 1ST LEVEL    MS INJ DX/THER AGNT PARAVERT FACET JOINT, LUMBAR/SAC, 2ND LEVEL    MS INJ DX/THER AGNT PARAVERT FACET JOINT, LUMBAR/SAC, ADD LEVEL   2. Chronic pain syndrome  traMADol (ULTRAM) 50 MG tablet    oxyCODONE-acetaminophen (PERCOCET) 5-325 MG per tablet    gabapentin (NEURONTIN) 600 MG tablet   3.  Cervical spondylosis without myelopathy  traMADol (ULTRAM) 50 MG tablet    oxyCODONE-acetaminophen (PERCOCET) 5-325 MG per tablet    gabapentin (NEURONTIN) 600 MG tablet   4. Postlaminectomy syndrome, lumbar region  traMADol (ULTRAM) 50 MG tablet    oxyCODONE-acetaminophen (PERCOCET) 5-325 MG per tablet    gabapentin (NEURONTIN) 600 MG tablet       Plan:     Periodic Controlled Substance Monitoring: Possible medication side effects, risk of tolerance/dependence & alternative treatments discussed. ,No signs of potential drug abuse or diversion identified. ,Assessed functional status. ,Obtaining appropriate analgesic effect of treatment. Keon Germainamento Suit, APRN - CNP)    Orders Placed This Encounter   Medications    traMADol (ULTRAM) 50 MG tablet     Sig: Take 1 tablet by mouth 2 times daily as needed for Pain for up to 30 days. Fill 1/29/22     Dispense:  60 tablet     Refill:  0     Reduce doses taken as pain becomes manageable    oxyCODONE-acetaminophen (PERCOCET) 5-325 MG per tablet     Sig: Take 1 tablet by mouth every 8 hours as needed for Pain for up to 30 days. Dispense:  75 tablet     Refill:  0     Reduce doses taken as pain becomes manageable    gabapentin (NEURONTIN) 600 MG tablet     Sig: Take 1 tablet by mouth daily for 19 days. Dispense:  19 tablet     Refill:  0       Orders Placed This Encounter   Procedures    RI INJ DX/THER AGNT PARAVERT FACET JOINT, LUMBAR/SAC, 1ST LEVEL     Standing Status:   Future     Standing Expiration Date:   4/27/2022    RI INJ DX/THER AGNT PARAVERT FACET JOINT, LUMBAR/SAC, 2ND LEVEL     Standing Status:   Future     Standing Expiration Date:   4/27/2022    RI INJ DX/THER AGNT PARAVERT FACET JOINT, LUMBAR/SAC, ADD LEVEL     BL L345 MBB with SK. (fusion L2-3). Standing Status:   Future     Standing Expiration Date:   4/27/2022     Discussed options with the patient today. Continue following with Dr. Jason Conley for left hand post op care. No Meloxicam at this time.    He will call in for partial refill of gabapentin in a few weeks to get meds due on same date. He would like to retry RFA which has helped in the remote past.  We will proceed with lumbar MBB's. All questions were answered. Pt verbalized understanding and agrees with above plan. UDS reviewed and appropriate from 9/23/21. Narcan sent 5/14/21. We will go ahead and order diagnostic bilateral L3, L4, L5 medial branch blocks to see if the patient is a candidate for RF ablation. he has failed conservative treatment in the past. Anatomic model of pathology was shown. Risks and benefits of the procedure were discussed. All questions were answered and patient understands and agrees with the plan.         Will continue medications for chronic pain as they help pt function with ADL and improve quality of life.  Discussed possible risks of opiate medication with pt, including but not limited to, constipation, nausea or vomiting, sedation, urinary retention, dependence and possible addiction. Pt agrees to use medication as directed. Pt advised to not use opiates while driving or operating heavy equipment, or in situations where pt may harm him/herself or others.  Pt is aware that while on narcotics, pt needs to be seen monthly to reassess pain and need for continued medication. NDP reviewed. OARRS was reviewed. This NP saw pt under direct supervision of Dr. Ellen Alfredo. Follow up:  Return in about 4 weeks (around 2/24/2022) for review meds and reassess pain.     Michael Marie, APRN - CNP

## 2022-02-01 ENCOUNTER — PROCEDURE VISIT (OUTPATIENT)
Dept: PAIN MANAGEMENT | Age: 57
End: 2022-02-01
Payer: COMMERCIAL

## 2022-02-01 DIAGNOSIS — M47.817 LUMBOSACRAL SPONDYLOSIS WITHOUT MYELOPATHY: ICD-10-CM

## 2022-02-01 PROCEDURE — 64494 INJ PARAVERT F JNT L/S 2 LEV: CPT | Performed by: PAIN MEDICINE

## 2022-02-01 PROCEDURE — 64493 INJ PARAVERT F JNT L/S 1 LEV: CPT | Performed by: PAIN MEDICINE

## 2022-02-01 RX ORDER — LIDOCAINE HYDROCHLORIDE 10 MG/ML
10 INJECTION, SOLUTION EPIDURAL; INFILTRATION; INTRACAUDAL; PERINEURAL ONCE
Status: COMPLETED | OUTPATIENT
Start: 2022-02-01 | End: 2022-02-01

## 2022-02-01 RX ADMIN — LIDOCAINE HYDROCHLORIDE 10 MG: 10 INJECTION, SOLUTION EPIDURAL; INFILTRATION; INTRACAUDAL; PERINEURAL at 08:46

## 2022-02-01 NOTE — PROGRESS NOTES
[facility]) Physicians  Neurosurgery and Pain [facility]  [address], [address] [facility] , [address] [facility], [place]: [phone]  F: [phone]      [address]      Provider: Papi Mcdowell DO          Patient Name: Garo Arambula : 1965        Date: 2022       Garo Arambula is here today for interventional pain management. Standard ASIPP guidelines were followed and sterile technique used. Area was cleaned with Betadine x3. Informed consent was obtained. Fluoroscopic guidance was used for this procedure. Junction of superior articular process and transverse process was identified. For L5 dorsal primary ramus groove of sacral ala and SAP of S1 was identified. Appropriate length spinal needle was used and advanced to correct anatomic location. Negative aspiration was achieved. At each site approximately 1/2cc of 1% preservative free Lidocaine was injected without difficulty. Patient tolerated the procedure well, no obvious complications occurred during the procedure. Patient was appropriately monitored and discharged home in stable condition with their usual motor strength. The patient will keep close track of pain over the next several hours and call our office tomorrow and let us know what percentage of pain relief is experienced. Post op Instructions were given to patient. Relevant and recent imaging reviewed with patient today. [x] Bilateral [] T12 [] S1      [] L1 [] S2     [] Right [] L2 [] S3      [x] L3      [] Left [x] L4         [x] L5 [] S. I. Patient had >80% pain relief following procedure with positive facet joint provocative maneuvers, schedule RF ablation.     Papi Mcdowell DO

## 2022-02-07 ENCOUNTER — OFFICE VISIT (OUTPATIENT)
Dept: PAIN MANAGEMENT | Age: 57
End: 2022-02-07
Payer: COMMERCIAL

## 2022-02-07 DIAGNOSIS — M47.817 LUMBOSACRAL SPONDYLOSIS WITHOUT MYELOPATHY: ICD-10-CM

## 2022-02-07 PROCEDURE — 64636 DESTROY L/S FACET JNT ADDL: CPT | Performed by: PAIN MEDICINE

## 2022-02-07 PROCEDURE — 64635 DESTROY LUMB/SAC FACET JNT: CPT | Performed by: PAIN MEDICINE

## 2022-02-07 RX ORDER — LIDOCAINE HYDROCHLORIDE 10 MG/ML
10 INJECTION, SOLUTION EPIDURAL; INFILTRATION; INTRACAUDAL; PERINEURAL ONCE
Status: COMPLETED | OUTPATIENT
Start: 2022-02-07 | End: 2022-02-07

## 2022-02-07 RX ORDER — BETAMETHASONE SODIUM PHOSPHATE AND BETAMETHASONE ACETATE 3; 3 MG/ML; MG/ML
6 INJECTION, SUSPENSION INTRA-ARTICULAR; INTRALESIONAL; INTRAMUSCULAR; SOFT TISSUE ONCE
Status: COMPLETED | OUTPATIENT
Start: 2022-02-07 | End: 2022-02-07

## 2022-02-07 RX ADMIN — LIDOCAINE HYDROCHLORIDE 10 MG: 10 INJECTION, SOLUTION EPIDURAL; INFILTRATION; INTRACAUDAL; PERINEURAL at 09:42

## 2022-02-07 RX ADMIN — BETAMETHASONE SODIUM PHOSPHATE AND BETAMETHASONE ACETATE 6 MG: 3; 3 INJECTION, SUSPENSION INTRA-ARTICULAR; INTRALESIONAL; INTRAMUSCULAR; SOFT TISSUE at 09:42

## 2022-02-07 NOTE — PROGRESS NOTES
[facility]) Physicians  Neurosurgery and Pain [address] [facility] [place]: [phone]  F: [phone]      Lumbar Radio Frequency Ablation     Provider: Janette Fernández DO          Patient Name: Laura Levy : 1965        Date: 2022      Laura Levy is here today for interventional pain management. Standard ASIPP guidelines were followed and sterile technique used. Area was cleaned with Betadine x3. Informed consent was obtained. Fluoroscopic guidance was used for this procedure. Multiple views of fluoroscopy were used during procedure to assist with needle placement. Appropriate sized RF 10mm active tip needle was used and advance to appropriate anatomic location. There was appropriate multifidus contraction noted with motor stimulation at 2 Hz between 0.5-1.5 volts. No limb or gluteal contraction was noted taking it up to 3.5 volts. Prior to lesioning at 80 degrees Celsius for 90 seconds, approximately 0.75mg/1mg of Celestone and ½ cc of 1% preservative free Lidocaine was injected. Impedance was between 200-500 ohms during the procedure. Patient tolerated the procedure well, no obvious complications occurred during the procedure. Patient was appropriately monitored and discharged home in stable condition with their usual motor strength. Post Op instructions were given to patient.           [] Bilateral [] T11 [] L1 [] S1     [] T12 [] L2 [] S2    [] Right  [x] L3 [] S3      [x] L4 [] S4    [x] Left  [x] L5                              Janette Fernández DO

## 2022-02-16 ENCOUNTER — TELEPHONE (OUTPATIENT)
Dept: PAIN MANAGEMENT | Age: 57
End: 2022-02-16

## 2022-02-16 NOTE — TELEPHONE ENCOUNTER
BENEFITS: RT L3,4,5 RFA    Insurance: MMO  Phone: [phone]  Contact Name: Noe Higueran  Effective Date: 9.1.2021     Plan year: YES-CALENDAR  Deductible:  0.00      Deductible Met: 0.00  Allowed/benefits paid at: 100% AFTER $15.00 COPAY  OOP: NO OOP MAXIMUM  Freq Limits: 55612 & 83120--3  DAYS  Prior Auth Requirement: NO AUTH REQUIRED    Notes: NO PRE-EX LCAUSE    Call Reference #: [id number]    Time of call: 7:55AM

## 2022-02-21 ENCOUNTER — OFFICE VISIT (OUTPATIENT)
Dept: PAIN MANAGEMENT | Age: 57
End: 2022-02-21
Payer: COMMERCIAL

## 2022-02-21 DIAGNOSIS — M96.1 POSTLAMINECTOMY SYNDROME, LUMBAR REGION: ICD-10-CM

## 2022-02-21 DIAGNOSIS — G89.4 CHRONIC PAIN SYNDROME: ICD-10-CM

## 2022-02-21 DIAGNOSIS — M47.817 LUMBOSACRAL SPONDYLOSIS WITHOUT MYELOPATHY: Primary | ICD-10-CM

## 2022-02-21 DIAGNOSIS — M47.812 CERVICAL SPONDYLOSIS WITHOUT MYELOPATHY: ICD-10-CM

## 2022-02-21 PROCEDURE — 64635 DESTROY LUMB/SAC FACET JNT: CPT | Performed by: PAIN MEDICINE

## 2022-02-21 PROCEDURE — 64636 DESTROY L/S FACET JNT ADDL: CPT | Performed by: PAIN MEDICINE

## 2022-02-21 RX ORDER — GABAPENTIN 600 MG/1
600 TABLET ORAL DAILY
Qty: 19 TABLET | Refills: 0 | Status: SHIPPED | OUTPATIENT
Start: 2022-02-21 | End: 2022-03-24 | Stop reason: SDUPTHER

## 2022-02-21 RX ORDER — OXYCODONE HYDROCHLORIDE AND ACETAMINOPHEN 5; 325 MG/1; MG/1
1 TABLET ORAL EVERY 8 HOURS PRN
Qty: 75 TABLET | Refills: 0 | Status: SHIPPED | OUTPATIENT
Start: 2022-02-21 | End: 2022-03-24 | Stop reason: SDUPTHER

## 2022-02-21 RX ORDER — LIDOCAINE HYDROCHLORIDE 10 MG/ML
10 INJECTION, SOLUTION EPIDURAL; INFILTRATION; INTRACAUDAL; PERINEURAL ONCE
Status: COMPLETED | OUTPATIENT
Start: 2022-02-21 | End: 2022-02-21

## 2022-02-21 RX ORDER — TRAMADOL HYDROCHLORIDE 50 MG/1
50 TABLET ORAL 2 TIMES DAILY PRN
Qty: 60 TABLET | Refills: 0 | Status: SHIPPED | OUTPATIENT
Start: 2022-02-21 | End: 2022-03-24 | Stop reason: SDUPTHER

## 2022-02-21 RX ORDER — BETAMETHASONE SODIUM PHOSPHATE AND BETAMETHASONE ACETATE 3; 3 MG/ML; MG/ML
6 INJECTION, SUSPENSION INTRA-ARTICULAR; INTRALESIONAL; INTRAMUSCULAR; SOFT TISSUE ONCE
Status: COMPLETED | OUTPATIENT
Start: 2022-02-21 | End: 2022-02-21

## 2022-02-21 RX ADMIN — LIDOCAINE HYDROCHLORIDE 10 MG: 10 INJECTION, SOLUTION EPIDURAL; INFILTRATION; INTRACAUDAL; PERINEURAL at 09:17

## 2022-02-21 RX ADMIN — BETAMETHASONE SODIUM PHOSPHATE AND BETAMETHASONE ACETATE 6 MG: 3; 3 INJECTION, SUSPENSION INTRA-ARTICULAR; INTRALESIONAL; INTRAMUSCULAR; SOFT TISSUE at 09:18

## 2022-02-21 NOTE — PROGRESS NOTES
[facility]) Physicians  Neurosurgery and Pain [address] [facility] [place]: [phone]  F: [phone]      Lumbar Radio Frequency Ablation     Provider: Sole Gates DO          Patient Name: Brenda Grant : 1965        Date: 2022      Brenda Grant is here today for interventional pain management. Standard ASIPP guidelines were followed and sterile technique used. Area was cleaned with Betadine x3. Informed consent was obtained. Fluoroscopic guidance was used for this procedure. Multiple views of fluoroscopy were used during procedure to assist with needle placement. Appropriate sized RF 10mm active tip needle was used and advance to appropriate anatomic location. There was appropriate multifidus contraction noted with motor stimulation at 2 Hz between 0.5-1.5 volts. No limb or gluteal contraction was noted taking it up to 3.5 volts. Prior to lesioning at 80 degrees Celsius for 90 seconds, approximately 0.75mg/1mg of Celestone and ½ cc of 1% preservative free Lidocaine was injected. Impedance was between 200-500 ohms during the procedure. Patient tolerated the procedure well, no obvious complications occurred during the procedure. Patient was appropriately monitored and discharged home in stable condition with their usual motor strength. Post Op instructions were given to patient.           [] Bilateral [] T11 [] L1 [] S1     [] T12 [] L2 [] S2    [x] Right  [x] L3 [] S3      [x] L4 [] S4    [] Left  [x] L5                              Sole Gates DO

## 2022-02-24 ENCOUNTER — HOSPITAL ENCOUNTER (OUTPATIENT)
Dept: CT IMAGING | Age: 57
Discharge: HOME | End: 2022-02-26
Payer: COMMERCIAL

## 2022-02-24 DIAGNOSIS — M19.042 ARTHRITIS OF LEFT HAND: ICD-10-CM

## 2022-02-24 PROCEDURE — 73200 CT UPPER EXTREMITY W/O DYE: CPT

## 2022-03-04 ENCOUNTER — ANESTHESIA EVENT (OUTPATIENT)
Dept: OPERATING ROOM | Age: 57
End: 2022-03-04
Payer: COMMERCIAL

## 2022-03-04 ENCOUNTER — ANESTHESIA (OUTPATIENT)
Dept: OPERATING ROOM | Age: 57
End: 2022-03-04
Payer: COMMERCIAL

## 2022-03-04 ENCOUNTER — HOSPITAL ENCOUNTER (OUTPATIENT)
Dept: GENERAL RADIOLOGY | Age: 57
Setting detail: OUTPATIENT SURGERY
Discharge: HOME | End: 2022-03-06
Attending: ORTHOPAEDIC SURGERY
Payer: COMMERCIAL

## 2022-03-04 ENCOUNTER — HOSPITAL ENCOUNTER (OUTPATIENT)
Age: 57
Setting detail: OUTPATIENT SURGERY
Discharge: HOME | End: 2022-03-04
Attending: ORTHOPAEDIC SURGERY | Admitting: ORTHOPAEDIC SURGERY
Payer: COMMERCIAL

## 2022-03-04 VITALS — SYSTOLIC BLOOD PRESSURE: 112 MMHG | OXYGEN SATURATION: 100 % | DIASTOLIC BLOOD PRESSURE: 72 MMHG

## 2022-03-04 VITALS
OXYGEN SATURATION: 96 % | SYSTOLIC BLOOD PRESSURE: 126 MMHG | HEART RATE: 84 BPM | WEIGHT: 170 LBS | HEIGHT: 70 IN | TEMPERATURE: 97.7 F | BODY MASS INDEX: 24.34 KG/M2 | DIASTOLIC BLOOD PRESSURE: 74 MMHG | RESPIRATION RATE: 18 BRPM

## 2022-03-04 DIAGNOSIS — R52 PAIN: ICD-10-CM

## 2022-03-04 LAB
ALBUMIN SERPL-MCNC: 4.1 G/DL (ref 3.5–4.6)
ALP BLD-CCNC: 96 U/L (ref 35–104)
ALT SERPL-CCNC: 32 U/L (ref 0–41)
ANION GAP SERPL CALCULATED.3IONS-SCNC: 13 MEQ/L (ref 9–15)
AST SERPL-CCNC: 44 U/L (ref 0–40)
BASOPHILS ABSOLUTE: 0.1 K/UL (ref 0–0.2)
BASOPHILS RELATIVE PERCENT: 1.4 %
BILIRUB SERPL-MCNC: 0.6 MG/DL (ref 0.2–0.7)
BILIRUBIN URINE: NEGATIVE
BLOOD, URINE: NEGATIVE
BUN BLDV-MCNC: 15 MG/DL (ref 6–20)
CALCIUM SERPL-MCNC: 9.1 MG/DL (ref 8.5–9.9)
CHLORIDE BLD-SCNC: 103 MEQ/L (ref 95–107)
CLARITY: CLEAR
CO2: 25 MEQ/L (ref 20–31)
COLOR: YELLOW
CREAT SERPL-MCNC: 0.8 MG/DL (ref 0.7–1.2)
EOSINOPHILS ABSOLUTE: 0.3 K/UL (ref 0–0.7)
EOSINOPHILS RELATIVE PERCENT: 5.9 %
GFR AFRICAN AMERICAN: >60
GFR NON-AFRICAN AMERICAN: >60
GLOBULIN: 2.4 G/DL (ref 2.3–3.5)
GLUCOSE BLD-MCNC: 91 MG/DL (ref 70–99)
GLUCOSE URINE: NEGATIVE MG/DL
HCT VFR BLD CALC: 42.4 % (ref 42–52)
HEMOGLOBIN: 14.5 G/DL (ref 14–18)
INR BLD: 1.1
KETONES, URINE: ABNORMAL MG/DL
LEUKOCYTE ESTERASE, URINE: NEGATIVE
LYMPHOCYTES ABSOLUTE: 2 K/UL (ref 1–4.8)
LYMPHOCYTES RELATIVE PERCENT: 36.1 %
MCH RBC QN AUTO: 33.6 PG (ref 27–31.3)
MCHC RBC AUTO-ENTMCNC: 34.1 % (ref 33–37)
MCV RBC AUTO: 98.5 FL (ref 80–100)
MONOCYTES ABSOLUTE: 0.5 K/UL (ref 0.2–0.8)
MONOCYTES RELATIVE PERCENT: 9.3 %
NEUTROPHILS ABSOLUTE: 2.6 K/UL (ref 1.4–6.5)
NEUTROPHILS RELATIVE PERCENT: 47.3 %
NITRITE, URINE: NEGATIVE
PDW BLD-RTO: 13.9 % (ref 11.5–14.5)
PH UA: 5.5 (ref 5–9)
PLATELET # BLD: 241 K/UL (ref 130–400)
POTASSIUM SERPL-SCNC: 3.8 MEQ/L (ref 3.4–4.9)
PROTEIN UA: NEGATIVE MG/DL
PROTHROMBIN TIME: 13.9 SEC (ref 12.3–14.9)
RBC # BLD: 4.3 M/UL (ref 4.7–6.1)
SODIUM BLD-SCNC: 141 MEQ/L (ref 135–144)
SPECIFIC GRAVITY UA: 1.02 (ref 1–1.03)
TOTAL PROTEIN: 6.5 G/DL (ref 6.3–8)
URINE REFLEX TO CULTURE: ABNORMAL
UROBILINOGEN, URINE: 0.2 E.U./DL
WBC # BLD: 5.6 K/UL (ref 4.8–10.8)

## 2022-03-04 PROCEDURE — 85025 COMPLETE CBC W/AUTO DIFF WBC: CPT

## 2022-03-04 PROCEDURE — A4217 STERILE WATER/SALINE, 500 ML: HCPCS | Performed by: ORTHOPAEDIC SURGERY

## 2022-03-04 PROCEDURE — 93005 ELECTROCARDIOGRAM TRACING: CPT | Performed by: ORTHOPAEDIC SURGERY

## 2022-03-04 PROCEDURE — 81003 URINALYSIS AUTO W/O SCOPE: CPT

## 2022-03-04 PROCEDURE — C1713 ANCHOR/SCREW BN/BN,TIS/BN: HCPCS | Performed by: ORTHOPAEDIC SURGERY

## 2022-03-04 PROCEDURE — 87075 CULTR BACTERIA EXCEPT BLOOD: CPT

## 2022-03-04 PROCEDURE — 7100000010 HC PHASE II RECOVERY - FIRST 15 MIN: Performed by: ORTHOPAEDIC SURGERY

## 2022-03-04 PROCEDURE — 2580000003 HC RX 258: Performed by: ORTHOPAEDIC SURGERY

## 2022-03-04 PROCEDURE — 3700000001 HC ADD 15 MINUTES (ANESTHESIA): Performed by: ORTHOPAEDIC SURGERY

## 2022-03-04 PROCEDURE — 85610 PROTHROMBIN TIME: CPT

## 2022-03-04 PROCEDURE — 7100000011 HC PHASE II RECOVERY - ADDTL 15 MIN: Performed by: ORTHOPAEDIC SURGERY

## 2022-03-04 PROCEDURE — 80053 COMPREHEN METABOLIC PANEL: CPT

## 2022-03-04 PROCEDURE — 2709999900 HC NON-CHARGEABLE SUPPLY: Performed by: ORTHOPAEDIC SURGERY

## 2022-03-04 PROCEDURE — 87070 CULTURE OTHR SPECIMN AEROBIC: CPT

## 2022-03-04 PROCEDURE — 6360000002 HC RX W HCPCS: Performed by: STUDENT IN AN ORGANIZED HEALTH CARE EDUCATION/TRAINING PROGRAM

## 2022-03-04 PROCEDURE — 87176 TISSUE HOMOGENIZATION CULTR: CPT

## 2022-03-04 PROCEDURE — 2580000003 HC RX 258: Performed by: STUDENT IN AN ORGANIZED HEALTH CARE EDUCATION/TRAINING PROGRAM

## 2022-03-04 PROCEDURE — 3600000004 HC SURGERY LEVEL 4 BASE: Performed by: ORTHOPAEDIC SURGERY

## 2022-03-04 PROCEDURE — 2580000003 HC RX 258: Performed by: NURSE ANESTHETIST, CERTIFIED REGISTERED

## 2022-03-04 PROCEDURE — 64415 NJX AA&/STRD BRCH PLXS IMG: CPT | Performed by: STUDENT IN AN ORGANIZED HEALTH CARE EDUCATION/TRAINING PROGRAM

## 2022-03-04 PROCEDURE — 3600000014 HC SURGERY LEVEL 4 ADDTL 15MIN: Performed by: ORTHOPAEDIC SURGERY

## 2022-03-04 PROCEDURE — 3700000000 HC ANESTHESIA ATTENDED CARE: Performed by: ORTHOPAEDIC SURGERY

## 2022-03-04 PROCEDURE — 3209999900 FLUORO FOR SURGICAL PROCEDURES

## 2022-03-04 PROCEDURE — 6360000002 HC RX W HCPCS: Performed by: ORTHOPAEDIC SURGERY

## 2022-03-04 DEVICE — GRAFT BNE SUB 5ML 1.7-10MM CANC CHIP MORSELIZED FRZ DRY: Type: IMPLANTABLE DEVICE | Site: ARM | Status: FUNCTIONAL

## 2022-03-04 RX ORDER — MIDAZOLAM HYDROCHLORIDE 1 MG/ML
INJECTION INTRAMUSCULAR; INTRAVENOUS PRN
Status: DISCONTINUED | OUTPATIENT
Start: 2022-03-04 | End: 2022-03-04 | Stop reason: SDUPTHER

## 2022-03-04 RX ORDER — SODIUM CHLORIDE, SODIUM LACTATE, POTASSIUM CHLORIDE, CALCIUM CHLORIDE 600; 310; 30; 20 MG/100ML; MG/100ML; MG/100ML; MG/100ML
INJECTION, SOLUTION INTRAVENOUS CONTINUOUS PRN
Status: DISCONTINUED | OUTPATIENT
Start: 2022-03-04 | End: 2022-03-04 | Stop reason: SDUPTHER

## 2022-03-04 RX ORDER — MELOXICAM 15 MG/1
15 TABLET ORAL DAILY
COMMUNITY
Start: 2015-11-02

## 2022-03-04 RX ORDER — SODIUM CHLORIDE, SODIUM LACTATE, POTASSIUM CHLORIDE, CALCIUM CHLORIDE 600; 310; 30; 20 MG/100ML; MG/100ML; MG/100ML; MG/100ML
INJECTION, SOLUTION INTRAVENOUS CONTINUOUS
Status: DISCONTINUED | OUTPATIENT
Start: 2022-03-04 | End: 2022-03-04 | Stop reason: HOSPADM

## 2022-03-04 RX ORDER — CYCLOBENZAPRINE HCL 10 MG
10 TABLET ORAL NIGHTLY
COMMUNITY
Start: 2022-02-02

## 2022-03-04 RX ORDER — PROPOFOL 10 MG/ML
INJECTION, EMULSION INTRAVENOUS CONTINUOUS PRN
Status: DISCONTINUED | OUTPATIENT
Start: 2022-03-04 | End: 2022-03-04 | Stop reason: SDUPTHER

## 2022-03-04 RX ORDER — MAGNESIUM HYDROXIDE 1200 MG/15ML
LIQUID ORAL CONTINUOUS PRN
Status: COMPLETED | OUTPATIENT
Start: 2022-03-04 | End: 2022-03-04

## 2022-03-04 RX ORDER — PITAVASTATIN CALCIUM 2.09 MG/1
2 TABLET, FILM COATED ORAL NIGHTLY
COMMUNITY
Start: 2022-02-09

## 2022-03-04 RX ADMIN — SODIUM CHLORIDE, POTASSIUM CHLORIDE, SODIUM LACTATE AND CALCIUM CHLORIDE: 600; 310; 30; 20 INJECTION, SOLUTION INTRAVENOUS at 09:13

## 2022-03-04 RX ADMIN — PROPOFOL 130 MCG/KG/MIN: 10 INJECTION, EMULSION INTRAVENOUS at 09:18

## 2022-03-04 RX ADMIN — CEFAZOLIN 2000 MG: 10 INJECTION, POWDER, FOR SOLUTION INTRAVENOUS at 09:13

## 2022-03-04 RX ADMIN — MIDAZOLAM HYDROCHLORIDE 2 MG: 1 INJECTION, SOLUTION INTRAMUSCULAR; INTRAVENOUS at 08:54

## 2022-03-04 ASSESSMENT — PULMONARY FUNCTION TESTS
PIF_VALUE: 0
PIF_VALUE: 1
PIF_VALUE: 0
PIF_VALUE: 0
PIF_VALUE: 1
PIF_VALUE: 0
PIF_VALUE: 0
PIF_VALUE: 1
PIF_VALUE: 0
PIF_VALUE: 0
PIF_VALUE: 1
PIF_VALUE: 0
PIF_VALUE: 1
PIF_VALUE: 0
PIF_VALUE: 1
PIF_VALUE: 0
PIF_VALUE: 0
PIF_VALUE: 1
PIF_VALUE: 1
PIF_VALUE: 0
PIF_VALUE: 1
PIF_VALUE: 0
PIF_VALUE: 1
PIF_VALUE: 1
PIF_VALUE: 0
PIF_VALUE: 1
PIF_VALUE: 0
PIF_VALUE: 1
PIF_VALUE: 0
PIF_VALUE: 1
PIF_VALUE: 0
PIF_VALUE: 1
PIF_VALUE: 0
PIF_VALUE: 1
PIF_VALUE: 0
PIF_VALUE: 1
PIF_VALUE: 1
PIF_VALUE: 0
PIF_VALUE: 1
PIF_VALUE: 0
PIF_VALUE: 1
PIF_VALUE: 0
PIF_VALUE: 1
PIF_VALUE: 0
PIF_VALUE: 1
PIF_VALUE: 0
PIF_VALUE: 1
PIF_VALUE: 0
PIF_VALUE: 1
PIF_VALUE: 0
PIF_VALUE: 1
PIF_VALUE: 1
PIF_VALUE: 0
PIF_VALUE: 1
PIF_VALUE: 0
PIF_VALUE: 1
PIF_VALUE: 0
PIF_VALUE: 1
PIF_VALUE: 0
PIF_VALUE: 1
PIF_VALUE: 1
PIF_VALUE: 0
PIF_VALUE: 0
PIF_VALUE: 1
PIF_VALUE: 0
PIF_VALUE: 0

## 2022-03-04 ASSESSMENT — PAIN SCALES - GENERAL: PAINLEVEL_OUTOF10: 0

## 2022-03-04 NOTE — OP NOTE
Operative Note      Patient: Clara Setvenson  YOB: 1965  MRN: [id number]    Date of Procedure: 3/4/2022      Preoperative diagnosis: Failure of fusion left third MCP    Postoperative diagnosis: Same     Procedure planned: Debridement of left third MCP fusion site with application of autogenous distal radius bone graft. Application of allograft cancellous bone to distal radius donor site. Procedure performed: Same    Surgeon: BRANDYN Guy PA  The physician assistant was present through the entire case. Given the nature of the disease process and the procedure to be performed a skilled surgical assistant was necessary during the case. The assistant was necessary in order to hold retractors and directly assist in the operation. A certified scrub tech was at the back table managing instruments and supplies for the surgical case. 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 He experienced failure of nonoperative treatment strategies. He underwent a standard fusion procedure. The patient showed failure of fusion approximately 12 weeks status post surgery. Treatment options were discussed. He elected to proceed forth with revision of the construct with curettage to the fusion plane with application of distal radius bone graft. Informed consent was signed and placed in the chart. Complications: None noted at the time of surgery     Description of operation: The patient was taken to the operative suite and placed in the supine position on the operating table. A timeout was performed and the left hand confirmed to be the operative site.   He was carefully positioned on the table in such a fashion as to pad all bony prominences and peripheral nerves. He was administered appropriate IV antibiotics and general anesthesia. He was prepped and draped in the normal sterile fashion. The tourniquet was elevated. The 15 blade was used to incise skin using the previous skin incision scar. Dissection was carried sharply through the subcutaneous plane elevating a full-thickness subcutaneous flap off of the extensor mechanism. The extensor mechanism was divided in its midline and the fusion plane exposed after dividing the capsular remnant. Stressing was undertaken. The fusion appeared stable to clinical inspection however fluoroscopically it was that there was no bone bridging. The rongeur and curette were used to debride the intended zone of arthrodesis exposing cancellous bone that was healthy in appearance to the metacarpal head and the P1 base. Approximately 1 L of normal sterile saline was then passed through the fusion construct. The 15 blade was then used to incise skin just ulnar to Breezy's tubercle. The third dorsal compartment was identified and opened and the EPL tendon retracted radially. The rongeur was used to decorticate Breezy's tubercle. A fine osteotome was used to harvest a block of cancellous bone which measured approximately 1.25 cm x 7 mm x 7 mm. Additional cancellous bone was harvested from the distal radius. The loose cancellous bone was then packed into the floor of the intended fusion construct. The cancellous block harvested from the distal radius was sectioned to allow for a portion to be applied to the more ulnar portion of the fusion construct and a portion to be applied to the more radial portion of the fusion construct. A tamp was used to push the cancellous graft into position both radially and ulnarly. We then densely packed additional loose cancellous bone into the fusion construct.   The hardware was notably stable and while revision was considered it was deemed most appropriate to retain the hardware as revision of that hardware could lead to its own complications. We did harvest some fibrinous tissue from the zone of fusion and sent it for culture on the off chance that this failure of fusion was related to an infection. There was no clinical evidence to support infection. Allograft bone was packed densely into the donor site. 2-0 Vicryl suture was used to repair the dorsal wrist extensor retinaculum leaving the EPL tendon superficial to the retinacular closure. The tourniquet was relieved and hemostasis confirmed. Wounds were closed in a layered fashion. Bulky soft dressing and splint were placed. The patient was allowed to arise from anesthesia and taken to recovery in stable condition. Overall he tolerated the procedure well. Disposition: Stable to PACU            Specimens:   ID Type Source Tests Collected by Time Destination   1 : LEFT 3RD METACARPAL  Tissue Hand CULTURE, SURGICAL Laron Hunt DO 3/4/2022 [id number]        Implants:  Implant Name Type Inv.  Item Serial No.  Lot No. LRB No. Used Action   GRAFT BNE SUB 5ML 1.7-10MM CANC CHIP MORSELIZED FRZ DRY - [id number]  GRAFT BNE SUB 5ML 1.7-10MM CANC CHIP MORSELIZED FRZ DRY [id number] [employer] [id number] Left 1 Implanted           Electronically signed by Nancy Hayes DO on 3/4/2022 at 11:58 AM

## 2022-03-04 NOTE — PROGRESS NOTES
Discharge instructions reviewed with pt and wife, both verb understanding, paper script for pain med given also, pt has no c/o pain sling in place and ice to ext

## 2022-03-04 NOTE — ANESTHESIA PRE PROCEDURE
Department of Anesthesiology  Preprocedure Note       Name:  Olga Singh   Age:  60 y.o.  :  1965                                          MRN:  [id number]         Date:  3/4/2022      Surgeon: Lien Caldwell):  Leanna Valentino DO    Procedure: Procedure(s):  LEFT REVISION LEFT LONG FINGER METACARPOPHALANGEAL FUSION WITH DISTAL RADIUS AUTOGENOUS BONE GRAFTING SUPINE; C-ARM; SIM Digital HAND SET (LABS ON ADMIT)    Medications prior to admission:   Prior to Admission medications    Medication Sig Start Date End Date Taking? Authorizing Provider   traMADol (ULTRAM) 50 MG tablet Take 1 tablet by mouth 2 times daily as needed for Pain for up to 30 days. Fill 1/29/22 2/21/22 3/23/22  Mariam Homans, DO   oxyCODONE-acetaminophen (PERCOCET) 5-325 MG per tablet Take 1 tablet by mouth every 8 hours as needed for Pain for up to 30 days. 2/21/22 3/23/22  Mariam Homans, DO   gabapentin (NEURONTIN) 600 MG tablet Take 1 tablet by mouth daily for 19 days. 2/21/22 3/12/22  Mariam Homans, DO   naloxone 4 MG/0.1ML LIQD nasal spray 1 spray by Nasal route as needed for Opioid Reversal 21   Anjelica Del Real MD       Current medications:    Current Facility-Administered Medications   Medication Dose Route Frequency Provider Last Rate Last Admin    ceFAZolin (ANCEF) 2000 mg in dextrose 5 % 100 mL IVPB  2,000 mg IntraVENous On Call to Steve Avendano DO           Allergies:     Allergies   Allergen Reactions    Tetanus Toxoid        Problem List:    Patient Active Problem List   Diagnosis Code    Osteoarthritis of spine with radiculopathy, lumbar region M47.26    Displacement of lumbar intervertebral disc without myelopathy M51.26    Lumbar stenosis M48.061    Primary osteoarthritis of right hip M16.11    Cervical spondylosis without myelopathy M47.812    Postlaminectomy syndrome, lumbar region M96.1    Lumbosacral spondylosis without myelopathy M47.817    Degenerative disc disease, cervical M50.30    Foraminal stenosis of cervical region M48.02       Past Medical History:        Diagnosis Date    Chronic back pain        Past Surgical History:        Procedure Laterality Date    CERVICAL SPINE SURGERY  01/05/2018    SPINE SURGERY  9-2-15    LUMBAR       Social History:    Social History     Tobacco Use    Smoking status: Never Smoker    Smokeless tobacco: Never Used   Substance Use Topics    Alcohol use: Yes     Alcohol/week: 0.0 standard drinks                                Counseling given: Not Answered      Vital Signs (Current): There were no vitals filed for this visit. BP Readings from Last 3 Encounters:   01/27/22 116/79   12/02/17 117/80   11/16/16 (!) 148/80       NPO Status:  8+ hours                                                                               BMI:   Wt Readings from Last 3 Encounters:   01/27/22 168 lb (76.2 kg)   10/21/21 170 lb (77.1 kg)   09/23/21 168 lb (76.2 kg)     There is no height or weight on file to calculate BMI.    CBC:   Lab Results   Component Value Date    WBC 10.6 08/31/2015    RBC 4.43 08/31/2015    HGB 14.2 08/31/2015    HCT 43.0 08/31/2015    MCV 97.0 08/31/2015    RDW 14.0 08/31/2015     08/31/2015       CMP:   Lab Results   Component Value Date     08/31/2015    K 4.0 08/31/2015     08/31/2015    CO2 29 08/31/2015    BUN 19 08/31/2015    CREATININE 0.72 08/31/2015    GFRAA >60.0 08/31/2015    LABGLOM >60.0 08/31/2015    GLUCOSE 81 08/31/2015    CALCIUM 9.8 08/31/2015       POC Tests: No results for input(s): POCGLU, POCNA, POCK, POCCL, POCBUN, POCHEMO, POCHCT in the last 72 hours.     Coags:   Lab Results   Component Value Date    PROTIME 10.3 08/31/2015    INR 1.0 08/31/2015    APTT 23.8 08/31/2015       HCG (If Applicable): No results found for: PREGTESTUR, PREGSERUM, HCG, HCGQUANT     ABGs: No results found for: PHART, PO2ART, EZO4ECE, VKI8LTC, BEART, O5IRSKIS     Type & Screen (If Applicable):  No results found for: LABABO, LABRH    Drug/Infectious Status (If Applicable):  No results found for: HIV, HEPCAB    COVID-19 Screening (If Applicable): No results found for: COVID19        Anesthesia Evaluation  Patient summary reviewed and Nursing notes reviewed no history of anesthetic complications:   Airway: Mallampati: II  TM distance: >3 FB   Neck ROM: full  Mouth opening: > = 3 FB Dental: normal exam         Pulmonary:Negative Pulmonary ROS and normal exam                               Cardiovascular:Negative CV ROS  Exercise tolerance: good (>4 METS),         ECG reviewed               Beta Blocker:  Not on Beta Blocker         Neuro/Psych:   Negative Neuro/Psych ROS              GI/Hepatic/Renal: Neg GI/Hepatic/Renal ROS            Endo/Other: Negative Endo/Other ROS             Pt had PAT visit. Abdominal:             Vascular: negative vascular ROS. Other Findings:             Anesthesia Plan      MAC and regional     ASA 2     (LMA if needed)  Induction: intravenous. MIPS: Postoperative opioids intended and Prophylactic antiemetics administered. Anesthetic plan and risks discussed with patient. Plan discussed with CRNA.     Attending anesthesiologist reviewed and agrees with Pre Eval content              Linette Teague MD   3/4/2022

## 2022-03-04 NOTE — ANESTHESIA PROCEDURE NOTES
Peripheral Block    Patient location during procedure: pre-op  Start time: 3/4/2022 8:54 AM  End time: 3/4/2022 8:59 AM  Staffing  Performed: anesthesiologist   Anesthesiologist: Sona Armstrong MD  Preanesthetic Checklist  Completed: patient identified, IV checked, site marked, risks and benefits discussed, surgical consent, monitors and equipment checked, pre-op evaluation, timeout performed, anesthesia consent given, oxygen available and patient being monitored  Peripheral Block  Patient position: supine  Prep: ChloraPrep  Patient monitoring: cardiac monitor, continuous pulse ox, frequent blood pressure checks and IV access  Block type: Brachial plexus  Laterality: left  Injection technique: single-shot  Guidance: nerve stimulator and ultrasound guided  Local infiltration: ropivacaine  Infiltration strength: 0.5 %  Dose: 30 mL  Supraclavicular  Provider prep: mask and sterile gloves (Sterile probe cover)  Local infiltration: ropivacaine  Needle  Needle type: combined needle/nerve stimulator   Needle gauge: 22 G  Needle length: 5 cm  Needle localization: anatomical landmarks and ultrasound guidance  Assessment  Injection assessment: negative aspiration for heme, no paresthesia on injection and local visualized surrounding nerve on ultrasound  Paresthesia pain: immediately resolved  Slow fractionated injection: yes  Hemodynamics: stable  Additional Notes  Ultrasound image printed and saved in patient chart.     Sterile probe cover used    Reason for block: post-op pain management and at surgeon's request

## 2022-03-04 NOTE — ANESTHESIA POSTPROCEDURE EVALUATION
Department of Anesthesiology  Postprocedure Note    Patient: Brian Hill  MRN: [id number]  YOB: 1965  Date of evaluation: 3/4/2022  Time:  11:07 AM     Procedure Summary     Date: 03/04/22 Room / Location: [address]    Anesthesia Start: 0913 Anesthesia Stop: 1055    Procedure: LEFT REVISION LEFT LONG FINGER METACARPOPHALANGEAL FUSION WITH DISTAL RADIUS AUTOGENOUS BONE GRAFTING (Left ) Diagnosis: (LEFT LONG FINGER FAILED MCP FUSION)    Surgeons: Lulu Tinoco DO Responsible Provider: Jose Juarez MD    Anesthesia Type: MAC, regional ASA Status: 2          Anesthesia Type: MAC, regional    Boubacar Phase I: Boubacar Score: 10    Boubacar Phase II:      Last vitals: Reviewed and per EMR flowsheets.        Anesthesia Post Evaluation    Patient location during evaluation: bedside  Patient participation: complete - patient participated  Level of consciousness: awake and awake and alert  Airway patency: patent  Nausea & Vomiting: no nausea and no vomiting  Complications: no  Cardiovascular status: blood pressure returned to baseline and hemodynamically stable  Respiratory status: acceptable  Hydration status: euvolemic

## 2022-03-05 LAB
EKG ATRIAL RATE: 91 BPM
EKG P AXIS: 60 DEGREES
EKG P-R INTERVAL: 174 MS
EKG Q-T INTERVAL: 372 MS
EKG QRS DURATION: 86 MS
EKG QTC CALCULATION (BAZETT): 457 MS
EKG R AXIS: 47 DEGREES
EKG T AXIS: 45 DEGREES
EKG VENTRICULAR RATE: 91 BPM

## 2022-03-05 PROCEDURE — 93010 ELECTROCARDIOGRAM REPORT: CPT | Performed by: INTERNAL MEDICINE

## 2022-03-09 LAB — CULTURE SURGICAL: NORMAL

## 2022-03-24 ENCOUNTER — TELEPHONE (OUTPATIENT)
Dept: PAIN MANAGEMENT | Age: 57
End: 2022-03-24

## 2022-03-24 ENCOUNTER — OFFICE VISIT (OUTPATIENT)
Dept: PAIN MANAGEMENT | Age: 57
End: 2022-03-24
Payer: COMMERCIAL

## 2022-03-24 VITALS — HEIGHT: 70 IN | WEIGHT: 170 LBS | BODY MASS INDEX: 24.34 KG/M2 | TEMPERATURE: 96.9 F

## 2022-03-24 DIAGNOSIS — M96.1 POSTLAMINECTOMY SYNDROME, LUMBAR REGION: ICD-10-CM

## 2022-03-24 DIAGNOSIS — M47.812 CERVICAL SPONDYLOSIS WITHOUT MYELOPATHY: ICD-10-CM

## 2022-03-24 DIAGNOSIS — G89.4 CHRONIC PAIN SYNDROME: ICD-10-CM

## 2022-03-24 PROCEDURE — 1036F TOBACCO NON-USER: CPT | Performed by: NURSE PRACTITIONER

## 2022-03-24 PROCEDURE — 3017F COLORECTAL CA SCREEN DOC REV: CPT | Performed by: NURSE PRACTITIONER

## 2022-03-24 PROCEDURE — 99214 OFFICE O/P EST MOD 30 MIN: CPT | Performed by: NURSE PRACTITIONER

## 2022-03-24 PROCEDURE — G8420 CALC BMI NORM PARAMETERS: HCPCS | Performed by: NURSE PRACTITIONER

## 2022-03-24 PROCEDURE — G8427 DOCREV CUR MEDS BY ELIG CLIN: HCPCS | Performed by: NURSE PRACTITIONER

## 2022-03-24 PROCEDURE — G8484 FLU IMMUNIZE NO ADMIN: HCPCS | Performed by: NURSE PRACTITIONER

## 2022-03-24 RX ORDER — OXYCODONE HYDROCHLORIDE AND ACETAMINOPHEN 5; 325 MG/1; MG/1
1 TABLET ORAL EVERY 8 HOURS PRN
Qty: 75 TABLET | Refills: 0 | Status: SHIPPED | OUTPATIENT
Start: 2022-03-24 | End: 2022-04-23

## 2022-03-24 RX ORDER — GABAPENTIN 600 MG/1
600 TABLET ORAL DAILY
Qty: 30 TABLET | Refills: 0 | Status: SHIPPED | OUTPATIENT
Start: 2022-03-24 | End: 2022-04-23

## 2022-03-24 RX ORDER — TRAMADOL HYDROCHLORIDE 50 MG/1
50 TABLET ORAL 2 TIMES DAILY PRN
Qty: 60 TABLET | Refills: 0 | Status: SHIPPED | OUTPATIENT
Start: 2022-03-24 | End: 2022-04-23

## 2022-03-24 ASSESSMENT — ENCOUNTER SYMPTOMS
BLOOD IN STOOL: 0
SHORTNESS OF BREATH: 0

## 2022-03-24 NOTE — TELEPHONE ENCOUNTER
Per CoverMyMeds. com, Oxycodone (Percocet) 5-325mg tablet approved. [facility] auth notification form faxed to [facility] with information ([phone]). Approved today  [id number]; Review Type:Prior Auth; Coverage Start Date:02/22/2022; Coverage End Date:03/24/2023

## 2022-03-24 NOTE — PROGRESS NOTES
Olga Singh  (1965)    3/24/2022    Subjective:     Olga Singh is 60 y.o. male who complains today of:    Chief Complaint   Patient presents with    Back Pain         Allergies:  Tetanus toxoid    Past Medical History:   Diagnosis Date    Arthritis     Chronic back pain     Hyperlipidemia      Past Surgical History:   Procedure Laterality Date    CERVICAL SPINE SURGERY  01/05/2018    FINGER SURGERY Left 3/4/2022    LEFT REVISION LEFT LONG FINGER METACARPOPHALANGEAL FUSION WITH DISTAL RADIUS AUTOGENOUS BONE GRAFTING AND CANCELLOUS CHIPS performed by Leanna Valentino DO at [address]  9-2-15    LUMBAR     Family History   Problem Relation Age of Onset    Cancer Mother      Social History     Socioeconomic History    Marital status:      Spouse name: Not on file    Number of children: Not on file    Years of education: Not on file    Highest education level: Not on file   Occupational History    Not on file   Tobacco Use    Smoking status: Never Smoker    Smokeless tobacco: Never Used   Vaping Use    Vaping Use: Never used   Substance and Sexual Activity    Alcohol use: Yes     Alcohol/week: 0.0 standard drinks    Drug use: No    Sexual activity: Not on file   Other Topics Concern    Not on file   Social History Narrative    Not on file     Social Determinants of Health     Financial Resource Strain:     Difficulty of Paying Living Expenses: Not on file   Food Insecurity:     Worried About Running Out of Food in the Last Year: Not on file    Hugo of Food in the Last Year: Not on file   Transportation Needs:     Lack of Transportation (Medical): Not on file    Lack of Transportation (Non-Medical):  Not on file   Physical Activity:     Days of Exercise per Week: Not on file    Minutes of Exercise per Session: Not on file   Stress:     Feeling of Stress : Not on file   Social Connections:     Frequency of Communication with Friends and Family: Not on file    Frequency of Social Gatherings with Friends and Family: Not on file    Attends [employer] Services: Not on file    Active Member of Clubs or Organizations: Not on file    Attends Club or Organization Meetings: Not on file    Marital Status: Not on file   Intimate Partner Violence:     Fear of Current or Ex-Partner: Not on file    Emotionally Abused: Not on file    Physically Abused: Not on file    Sexually Abused: Not on file   Housing Stability:     Unable to Pay for Housing in the Last Year: Not on file    Number of [place] in the Last Year: Not on file    Unstable Housing in the Last Year: Not on file       Current Outpatient Medications on File Prior to Visit   Medication Sig Dispense Refill    meloxicam (MOBIC) 15 MG tablet Take 15 mg by mouth daily      cyclobenzaprine (FLEXERIL) 10 MG tablet Take 10 mg by mouth at bedtime      LIVALO 2 MG TABS tablet Take 2 mg by mouth at bedtime      naloxone 4 MG/0.1ML LIQD nasal spray 1 spray by Nasal route as needed for Opioid Reversal 1 each 0     No current facility-administered medications on file prior to visit. Pt presents today for a f/u of chronic neck and low back pain. He has noticed an improvement in the leg pain and back pain since RFA. He is still off his meloxicam due to surgeries. He had a left middle finger fusion on 12/8 then Had left finger bone graft and fusion on 3/4. Now in a hard cast for another few weeks. He stopped taking the Meloxicam per Dr. Cummings Manner. The lumbar injections did not help as much as in the past.  Pt feels pain level and functioning improves with prescribed medications and is able to perform ADLs. Pt feels that prolonged sitting aggravates the pain. He was seeing chiropractor 2x/week for 6 weeks then stopped going, feels it helped. Pt c/o LLE radiating numbness and tingling down to the foot, worse when sitting too long. ACDF C4-5, C5-6 with Dr Bonnie Mix on 1/5/18.  He has a history of multiple back surgeries and fusion at L2-3. He gets neuropathy worse at night. L CTR with Dr. Krissy Santos January 2020. Taking Percocet, tramadol, Neurontin for chronic pain. 2/21/2022 right L3-4-5 RFA  2/7/2022 left L3-4-5 RFA  2/1/2022 bilateral L3-4-5 MBB  12/2/21 BL L4-5, 5-S1 facet inj  4/1/2021 bilateral L3-4, 4-5, 5-S1 facet injection  11/2/20 BL L3-4, 4-5, 5-S1 facet inj  7/20/20 BL L4-5 MARCIA  5/12/20 BL L3-4, 4-5, 5-S1 facet inj  1/20/20 L L3-4, 4-5, 5-S1 facet inj  1/17/20 L shoulder inj      Review of Systems   Constitutional: Negative for appetite change and fever. HENT: Negative for hearing loss. Eyes: Negative for visual disturbance. Respiratory: Negative for shortness of breath. Gastrointestinal: Negative for blood in stool. Genitourinary: Negative for difficulty urinating and hematuria. Musculoskeletal: Positive for arthralgias. Skin: Negative for rash. Neurological: Negative for facial asymmetry. Hematological: Negative for adenopathy. Psychiatric/Behavioral: Negative for self-injury. All other systems reviewed and are negative. Objective:     Vitals:  Temp 96.9 °F (36.1 °C)   Ht 5' 10\" (1.778 m)   Wt 170 lb (77.1 kg)   BMI 24.39 kg/m² Pain Score:   3      Physical Exam  Vitals and nursing note reviewed. Pt is alert and oriented x 3.  Recent and remote memory is intact.  Mood, judgement and affect are normal.  No signs of distress or SOB noted.  Visualized skin intact.  Sensation intact to light touch. Decreased ROM with flexion and extension of low back.  Tender with palpation to bilateral lumbar spine with positive provacative maneuvers noted.  Negative SLR.    Strength, balance, and coordination are functional for ambulation. Decreased ROM with flexion, extension and rotation of cervical spine. Muscle tightness noted. Positive bilateral cervical facet joint provacative with palpation. Negative Spurling's maneuver.  Negative Deon's sign. Strong grasp B/L.  Strength is functional in UE bilaterally.  Pulses are intact. Left hand in hard cast.      Assessment:      Diagnosis Orders   1. Chronic pain syndrome  oxyCODONE-acetaminophen (PERCOCET) 5-325 MG per tablet    traMADol (ULTRAM) 50 MG tablet    gabapentin (NEURONTIN) 600 MG tablet   2. Cervical spondylosis without myelopathy  oxyCODONE-acetaminophen (PERCOCET) 5-325 MG per tablet    traMADol (ULTRAM) 50 MG tablet    gabapentin (NEURONTIN) 600 MG tablet   3. Postlaminectomy syndrome, lumbar region  oxyCODONE-acetaminophen (PERCOCET) 5-325 MG per tablet    traMADol (ULTRAM) 50 MG tablet    gabapentin (NEURONTIN) 600 MG tablet       Plan:     Periodic Controlled Substance Monitoring: Possible medication side effects, risk of tolerance/dependence & alternative treatments discussed. ,No signs of potential drug abuse or diversion identified. ,Assessed functional status. ,Obtaining appropriate analgesic effect of treatment. (Osyka Husbands, APRN - CNP)    Orders Placed This Encounter   Medications    oxyCODONE-acetaminophen (PERCOCET) 5-325 MG per tablet     Sig: Take 1 tablet by mouth every 8 hours as needed for Pain for up to 30 days. Dispense:  75 tablet     Refill:  0     Reduce doses taken as pain becomes manageable    traMADol (ULTRAM) 50 MG tablet     Sig: Take 1 tablet by mouth 2 times daily as needed for Pain for up to 30 days. Dispense:  60 tablet     Refill:  0     Reduce doses taken as pain becomes manageable    gabapentin (NEURONTIN) 600 MG tablet     Sig: Take 1 tablet by mouth daily for 30 days. Dispense:  30 tablet     Refill:  0       No orders of the defined types were placed in this encounter. Discussed options with the patient today. Continue following with Dr. Natasha Osler for left hand post op care. No Meloxicam at this time.  RFA helping.   We will proceed with lumbar MBB's. He gets his hard cast off in a few weeks and is hoping to get back on meloxicam. All questions were answered.  Pt verbalized

## 2022-07-01 DIAGNOSIS — M96.1 POSTLAMINECTOMY SYNDROME, LUMBAR REGION: ICD-10-CM

## 2022-07-01 DIAGNOSIS — M47.812 CERVICAL SPONDYLOSIS WITHOUT MYELOPATHY: ICD-10-CM

## 2022-07-01 DIAGNOSIS — G89.4 CHRONIC PAIN SYNDROME: ICD-10-CM

## 2022-07-01 RX ORDER — GABAPENTIN 600 MG/1
TABLET ORAL
Qty: 30 TABLET | Refills: 0 | OUTPATIENT
Start: 2022-07-01

## 2022-10-06 DIAGNOSIS — G89.4 CHRONIC PAIN SYNDROME: ICD-10-CM

## 2022-10-06 DIAGNOSIS — M96.1 POSTLAMINECTOMY SYNDROME, LUMBAR REGION: ICD-10-CM

## 2022-10-06 DIAGNOSIS — M47.812 CERVICAL SPONDYLOSIS WITHOUT MYELOPATHY: ICD-10-CM

## 2022-10-11 RX ORDER — GABAPENTIN 600 MG/1
TABLET ORAL
Qty: 30 TABLET | Refills: 0 | OUTPATIENT
Start: 2022-10-11

## 2023-03-22 PROBLEM — M54.50 LUMBAR PAIN: Status: ACTIVE | Noted: 2023-03-22

## 2023-03-22 PROBLEM — M54.9 UPPER BACK PAIN: Status: ACTIVE | Noted: 2023-03-22

## 2023-03-22 PROBLEM — G62.9 NEUROPATHY: Status: ACTIVE | Noted: 2023-03-22

## 2023-03-22 PROBLEM — M24.60 FUSION OF JOINT: Status: ACTIVE | Noted: 2023-03-22

## 2023-03-22 PROBLEM — E55.9 VITAMIN D DEFICIENCY: Status: ACTIVE | Noted: 2023-03-22

## 2023-03-22 PROBLEM — M54.12 CERVICAL RADICULOPATHY, CHRONIC: Status: ACTIVE | Noted: 2023-03-22

## 2023-03-22 PROBLEM — M79.10 MYALGIA: Status: ACTIVE | Noted: 2023-03-22

## 2023-03-22 PROBLEM — M54.16 CHRONIC RADICULAR LOW BACK PAIN: Status: ACTIVE | Noted: 2023-03-22

## 2023-03-22 PROBLEM — M79.646 FINGER PAIN: Status: ACTIVE | Noted: 2023-03-22

## 2023-03-22 PROBLEM — M79.643 HAND PAIN: Status: ACTIVE | Noted: 2023-03-22

## 2023-03-22 PROBLEM — L53.9 ERYTHEMA: Status: ACTIVE | Noted: 2023-03-22

## 2023-03-22 PROBLEM — M65.312 TRIGGER FINGER OF LEFT THUMB: Status: ACTIVE | Noted: 2023-03-22

## 2023-03-22 PROBLEM — M21.941: Status: ACTIVE | Noted: 2023-03-22

## 2023-03-22 PROBLEM — E16.2 HYPOGLYCEMIA: Status: ACTIVE | Noted: 2023-03-22

## 2023-03-22 PROBLEM — G89.29 CHRONIC RADICULAR LOW BACK PAIN: Status: ACTIVE | Noted: 2023-03-22

## 2023-03-22 PROBLEM — M41.50 DEGENERATIVE SCOLIOSIS IN ADULT PATIENT: Status: ACTIVE | Noted: 2023-03-22

## 2023-03-22 PROBLEM — E78.5 DYSLIPIDEMIA: Status: ACTIVE | Noted: 2023-03-22

## 2023-03-22 PROBLEM — M96.1 LUMBAR POSTLAMINECTOMY SYNDROME: Status: ACTIVE | Noted: 2023-03-22

## 2023-03-22 PROBLEM — M65.30 TRIGGER FINGER: Status: ACTIVE | Noted: 2023-03-22

## 2023-03-22 PROBLEM — G47.00 INSOMNIA: Status: ACTIVE | Noted: 2023-03-22

## 2023-03-22 PROBLEM — M25.519 SHOULDER PAIN: Status: ACTIVE | Noted: 2023-03-22

## 2023-03-22 PROBLEM — M19.042 OSTEOARTHRITIS OF FINGER OF LEFT HAND: Status: ACTIVE | Noted: 2023-03-22

## 2023-03-22 PROBLEM — M79.646 THUMB PAIN: Status: ACTIVE | Noted: 2023-03-22

## 2023-03-22 PROBLEM — F41.9 ANXIETY DISORDER: Status: ACTIVE | Noted: 2023-03-22

## 2023-03-22 PROBLEM — G56.22 CUBITAL TUNNEL SYNDROME ON LEFT: Status: ACTIVE | Noted: 2023-03-22

## 2023-03-22 PROBLEM — E53.8 VITAMIN B12 DEFICIENCY: Status: ACTIVE | Noted: 2023-03-22

## 2023-03-22 RX ORDER — VIT C/E/ZN/COPPR/LUTEIN/ZEAXAN 250MG-90MG
25 CAPSULE ORAL DAILY
COMMUNITY

## 2023-03-22 RX ORDER — METHOCARBAMOL 750 MG/1
750 TABLET, FILM COATED ORAL 3 TIMES DAILY PRN
COMMUNITY
End: 2024-04-19 | Stop reason: WASHOUT

## 2023-03-22 RX ORDER — CALCIUM CARBONATE 300MG(750)
400 TABLET,CHEWABLE ORAL DAILY
COMMUNITY
End: 2024-04-19 | Stop reason: WASHOUT

## 2023-03-22 RX ORDER — MELOXICAM 15 MG/1
1 TABLET ORAL DAILY
COMMUNITY
Start: 2022-05-25 | End: 2023-08-28

## 2023-03-22 RX ORDER — GABAPENTIN 300 MG/1
CAPSULE ORAL
COMMUNITY
Start: 2022-05-04 | End: 2023-03-24 | Stop reason: SDUPTHER

## 2023-03-22 RX ORDER — MULTIVITAMIN WITH IRON
1 TABLET ORAL DAILY
COMMUNITY

## 2023-03-22 RX ORDER — TRAMADOL HYDROCHLORIDE 50 MG/1
1 TABLET ORAL 2 TIMES DAILY PRN
COMMUNITY
End: 2023-03-24 | Stop reason: SDUPTHER

## 2023-03-22 RX ORDER — DULOXETIN HYDROCHLORIDE 30 MG/1
30 CAPSULE, DELAYED RELEASE ORAL DAILY
COMMUNITY
End: 2023-03-24 | Stop reason: DRUGHIGH

## 2023-03-22 RX ORDER — CYCLOBENZAPRINE HCL 10 MG
.5-1 TABLET ORAL NIGHTLY PRN
COMMUNITY
Start: 2021-07-09 | End: 2023-03-27

## 2023-03-22 RX ORDER — CALCIUM CARBONATE 200(500)MG
2 TABLET,CHEWABLE ORAL DAILY
COMMUNITY
End: 2023-06-21 | Stop reason: ALTCHOICE

## 2023-03-22 RX ORDER — OXYCODONE AND ACETAMINOPHEN 5; 325 MG/1; MG/1
1-2 TABLET ORAL
COMMUNITY
Start: 2021-02-22 | End: 2023-04-04 | Stop reason: SDUPTHER

## 2023-03-22 RX ORDER — PITAVASTATIN CALCIUM 2.09 MG/1
1 TABLET, FILM COATED ORAL NIGHTLY
COMMUNITY
Start: 2021-01-18 | End: 2023-06-01

## 2023-03-24 ENCOUNTER — OFFICE VISIT (OUTPATIENT)
Dept: PRIMARY CARE | Facility: CLINIC | Age: 58
End: 2023-03-24
Payer: COMMERCIAL

## 2023-03-24 DIAGNOSIS — M19.042 OSTEOARTHRITIS OF FINGER OF LEFT HAND: ICD-10-CM

## 2023-03-24 DIAGNOSIS — E16.2 HYPOGLYCEMIA: ICD-10-CM

## 2023-03-24 DIAGNOSIS — G89.29 CHRONIC RADICULAR LOW BACK PAIN: Primary | ICD-10-CM

## 2023-03-24 DIAGNOSIS — M54.9 UPPER BACK PAIN: ICD-10-CM

## 2023-03-24 DIAGNOSIS — F41.9 ANXIETY DISORDER, UNSPECIFIED TYPE: ICD-10-CM

## 2023-03-24 DIAGNOSIS — M79.10 MYALGIA: ICD-10-CM

## 2023-03-24 DIAGNOSIS — M96.1 LUMBAR POSTLAMINECTOMY SYNDROME: ICD-10-CM

## 2023-03-24 DIAGNOSIS — E78.5 DYSLIPIDEMIA: ICD-10-CM

## 2023-03-24 DIAGNOSIS — G62.9 NEUROPATHY: ICD-10-CM

## 2023-03-24 DIAGNOSIS — E55.9 VITAMIN D DEFICIENCY: ICD-10-CM

## 2023-03-24 DIAGNOSIS — E53.8 VITAMIN B12 DEFICIENCY: ICD-10-CM

## 2023-03-24 DIAGNOSIS — M54.50 LUMBAR PAIN: ICD-10-CM

## 2023-03-24 DIAGNOSIS — M25.511 ACUTE PAIN OF RIGHT SHOULDER: ICD-10-CM

## 2023-03-24 DIAGNOSIS — M54.16 CHRONIC RADICULAR LOW BACK PAIN: Primary | ICD-10-CM

## 2023-03-24 PROCEDURE — 99214 OFFICE O/P EST MOD 30 MIN: CPT | Performed by: FAMILY MEDICINE

## 2023-03-24 PROCEDURE — 1036F TOBACCO NON-USER: CPT | Performed by: FAMILY MEDICINE

## 2023-03-24 RX ORDER — GABAPENTIN 300 MG/1
300 CAPSULE ORAL
Qty: 150 CAPSULE | Refills: 0 | Status: SHIPPED | OUTPATIENT
Start: 2023-03-24 | End: 2023-06-28

## 2023-03-24 RX ORDER — DULOXETIN HYDROCHLORIDE 60 MG/1
60 CAPSULE, DELAYED RELEASE ORAL DAILY
Qty: 30 CAPSULE | Refills: 5 | Status: SHIPPED | OUTPATIENT
Start: 2023-03-24 | End: 2023-09-18

## 2023-03-24 RX ORDER — TRAMADOL HYDROCHLORIDE 50 MG/1
50 TABLET ORAL 2 TIMES DAILY PRN
Qty: 60 TABLET | Refills: 0 | Status: SHIPPED | OUTPATIENT
Start: 2023-03-24 | End: 2023-04-25 | Stop reason: SDUPTHER

## 2023-03-24 ASSESSMENT — PATIENT HEALTH QUESTIONNAIRE - PHQ9
2. FEELING DOWN, DEPRESSED OR HOPELESS: NOT AT ALL
SUM OF ALL RESPONSES TO PHQ9 QUESTIONS 1 AND 2: 0
1. LITTLE INTEREST OR PLEASURE IN DOING THINGS: NOT AT ALL

## 2023-03-24 NOTE — PROGRESS NOTES
"Subjective   Patient ID: Mikey Jean is a 57 y.o. male who presents for Back Pain, Neck Pain, and Peripheral Neuropathy.    HPI     Patient presents today for a follow-up on Back and neck pain, and Neuropathy. Is taking Percocet, Tramadol, Gabapenti. No concerns with this medication. Rates pain control/relief a 4/10. Pain is 50% controlled. [place]AR[place] reviewed today. Controlled substance contract signed today. Last took it this morning.    We started him on Cymbalta last visit states it seems to be helping we will go the next dose also Neurontin seems to be doing a bit better also with his chronic muscle upper back discomfort    Admits he has started therapy on left hand.  He has noticed weakness from being in a cast.  He still does not have full range of motion in neck.  Reports his MRI's from neurosurgeon have been denied.    He has difficulty with right shoulder pain at night.  He does shift position often during the middle of the night.  He does sleep with 2 pillows at night.      Review of Systems      Patient seen today for exam denies any problems with hearing, headaches, or vision.  Denies any shortness of breath, chest pain, nausea, or vomiting. No black stool, no blood in the stool. No heartburn type symptoms, denies any problems with constipation or diarrhea, and no dysuria-type symptoms.    All other symptoms have been reviewed and are negative for complaints.    The patient's allergies medications were reviewed with them today.    The patient's social, family, and surgical history were also reviewed here today, along with their past medical history.    Their [facility] and [facility] records, if applicable, were reviewed.    Objective   /78 (BP Location: Left arm, Patient Position: Sitting)   Pulse 107   Temp 36.1 °C (97 °F) (Temporal)   Resp 16   Ht 1.778 m (5' 10\")   Wt 79.8 kg (176 lb)   SpO2 97%   BMI 25.25 kg/m²     Physical Exam  Alert and active in no acute distress.  HEENT- TMs clear, " oropharynx negative, nares clear, no drainage noted, neck supple  With no adenopathy  Heart- regular rate and rhythm without murmur and no carotid bruits  Lungs- clear to auscultation bilaterally, no wheeze or rhonchi noted  Thyroid- negative for masses or nodularity  Abdomen- soft x4 bowel sounds positive, no masses or organomegaly, negative tenderness guarding or rebound  Neurological exam unremarkable- DTRs in upper and lower extremities within normal limits  Skin- no lesions noted    Left hand does show some weakness still from his last surgery to chemotherapy will be working on it    Right shoulder strength is intact 5 out of 5 some bursal tenderness noted looks like most likely from position with sleeping    Assessment/Plan   Problem List Items Addressed This Visit          Nervous    Chronic radicular low back pain - Primary    Neuropathy    Relevant Medications    gabapentin (Neurontin) 300 mg capsule    Other Relevant Orders    Opiate/Opioid/Benzo Extended Prescription Compliance       Musculoskeletal    Myalgia    Relevant Medications    DULoxetine (Cymbalta) 60 mg DR capsule    Osteoarthritis of finger of left hand    Shoulder pain       Endocrine/Metabolic    Hypoglycemia    Vitamin D deficiency    Vitamin B12 deficiency       Other    Anxiety disorder    Dyslipidemia    Lumbar pain    Relevant Medications    traMADol (Ultram) 50 mg tablet    Other Relevant Orders    Opiate/Opioid/Benzo Extended Prescription Compliance    Lumbar postlaminectomy syndrome    Upper back pain    Relevant Medications    traMADol (Ultram) 50 mg tablet    Other Relevant Orders    Opiate/Opioid/Benzo Extended Prescription Compliance       I personally reviewed the OARRS report for this patient. I have considered the risks of abuse, dependence, addiction, and diversion.  CSA 3/24/23  UDS 3/24/23    Refill Tramadol, Gabapentin.    Increase Cymbalta to 60mg every day with food daily.  Monitor BP with increase of Cymbalta.  Will  call me in 4 weeks having he is doing on the higher dose 3 [facility] MyChart    Recommend warm water and squeezing a sponge with left hand.    Continue to see PT and stretch.    Demonstrated shoulder exercises.  These should be performed 8-10 times every other day.  He can add hand weights and stretchy bands as able.  Ice following activity.    Follow up in 3 months or sooner if necessary.    Scribe Attestation  By signing my name below, I, Rosa Kendall MA , Scribe   attest that this documentation has been prepared under the direction and in the presence of Luis Miguel Jasmine DO.

## 2023-03-26 DIAGNOSIS — G89.29 OTHER CHRONIC PAIN: ICD-10-CM

## 2023-03-26 DIAGNOSIS — M54.16 RADICULOPATHY, LUMBAR REGION: ICD-10-CM

## 2023-03-27 RX ORDER — CYCLOBENZAPRINE HCL 10 MG
TABLET ORAL
Qty: 30 TABLET | Refills: 2 | Status: SHIPPED | OUTPATIENT
Start: 2023-03-27 | End: 2023-06-21

## 2023-04-03 DIAGNOSIS — M54.16 CHRONIC RADICULAR LOW BACK PAIN: ICD-10-CM

## 2023-04-03 DIAGNOSIS — G89.29 CHRONIC RADICULAR LOW BACK PAIN: ICD-10-CM

## 2023-04-03 NOTE — TELEPHONE ENCOUNTER
PCP: MICHAEL    MEDICATION & STRENGTH: OXYCODONE 5 - 325 MG    DIRECTIONS: 1 -2 TABLETS EVERY 6 TO 8 HOURS PRN FOR PAIN    QUANTITY: #75    LAST OV: 3/24/23    OARRS: 12/19/22    UDS: 5/4/22    CSA: 5/4/22    PHARMACY: [facility] [place]

## 2023-04-05 RX ORDER — OXYCODONE AND ACETAMINOPHEN 5; 325 MG/1; MG/1
1-2 TABLET ORAL EVERY 8 HOURS PRN
Qty: 75 TABLET | Refills: 0 | Status: SHIPPED | OUTPATIENT
Start: 2023-04-05 | End: 2023-05-05 | Stop reason: SDUPTHER

## 2023-04-25 DIAGNOSIS — M54.50 LUMBAR PAIN: ICD-10-CM

## 2023-04-25 DIAGNOSIS — M54.9 UPPER BACK PAIN: ICD-10-CM

## 2023-04-25 NOTE — TELEPHONE ENCOUNTER
Rx Refill Request Telephone Encounter    Name: Mikey Jean  :  1965    Medication Name:   tramadol  Dose (Optional):   50mg  Quantity (Optional):   #60  Directions (Optional):   take 1 tab (50mg) by mouth 2 times a day prn moderate pain (4-6) or severe pain (7-10)    ALLERGIES:    nkda    LAST DRUG SCREEN:     3/24/23  LAST MED CONTRACT:    3/24/23    Specific Pharmacy location:    [facility]    Date of last appointment:    3/24/23  Date of next appointment:    none    Best number to reach patient:    [phone]        Aware you are out of the office today

## 2023-04-26 RX ORDER — TRAMADOL HYDROCHLORIDE 50 MG/1
50 TABLET ORAL 2 TIMES DAILY PRN
Qty: 60 TABLET | Refills: 0 | Status: SHIPPED | OUTPATIENT
Start: 2023-04-26 | End: 2023-05-31

## 2023-05-01 VITALS
OXYGEN SATURATION: 97 % | BODY MASS INDEX: 25.2 KG/M2 | WEIGHT: 176 LBS | HEART RATE: 107 BPM | SYSTOLIC BLOOD PRESSURE: 148 MMHG | DIASTOLIC BLOOD PRESSURE: 85 MMHG | HEIGHT: 70 IN | TEMPERATURE: 97 F | RESPIRATION RATE: 16 BRPM

## 2023-05-04 DIAGNOSIS — G89.29 CHRONIC RADICULAR LOW BACK PAIN: ICD-10-CM

## 2023-05-04 DIAGNOSIS — M54.16 CHRONIC RADICULAR LOW BACK PAIN: ICD-10-CM

## 2023-05-04 NOTE — TELEPHONE ENCOUNTER
Rx Refill Request Telephone Encounter    Name: Mikey Jean  :  1965    Medication Name:   PERCOCET  Dose (Optional):   5 - 325 MG  Quantity (Optional):   #75  Directions (Optional):   1 - 2 TABLETS EVERY 8 HOURS PRN FOR PAIN    ALLERGIES:        LAST DRUG SCREEN:     3/24/23  LAST MED CONTRACT:    3/24/23    Specific Pharmacy location:    [facility]    Date of last appointment:    3/24/23  Date of next appointment:        Best number to reach patient:    [phone]

## 2023-05-05 RX ORDER — OXYCODONE AND ACETAMINOPHEN 5; 325 MG/1; MG/1
1-2 TABLET ORAL EVERY 8 HOURS PRN
Qty: 75 TABLET | Refills: 0 | Status: SHIPPED | OUTPATIENT
Start: 2023-05-05 | End: 2023-06-06 | Stop reason: SDUPTHER

## 2023-05-30 DIAGNOSIS — M54.50 LUMBAR PAIN: ICD-10-CM

## 2023-05-30 DIAGNOSIS — M54.9 UPPER BACK PAIN: ICD-10-CM

## 2023-05-31 RX ORDER — TRAMADOL HYDROCHLORIDE 50 MG/1
TABLET ORAL
Qty: 60 TABLET | Refills: 0 | Status: SHIPPED | OUTPATIENT
Start: 2023-05-31 | End: 2023-06-28

## 2023-06-01 DIAGNOSIS — E78.5 HYPERLIPIDEMIA, UNSPECIFIED: ICD-10-CM

## 2023-06-01 RX ORDER — PITAVASTATIN CALCIUM 2.09 MG/1
TABLET, FILM COATED ORAL
Qty: 30 TABLET | Refills: 5 | Status: SHIPPED | OUTPATIENT
Start: 2023-06-01 | End: 2023-10-24 | Stop reason: ALTCHOICE

## 2023-06-05 DIAGNOSIS — M54.16 CHRONIC RADICULAR LOW BACK PAIN: ICD-10-CM

## 2023-06-05 DIAGNOSIS — G89.29 CHRONIC RADICULAR LOW BACK PAIN: ICD-10-CM

## 2023-06-05 NOTE — TELEPHONE ENCOUNTER
Rx Refill Request Telephone Encounter    Name: Mikey Jean  :  1965    Medication Name:   Percocet 5-325 MG  Quantity (Optional):   75  Directions (Optional):   Take 1-2 tablets by mouth every 8 hours if needed for severe pain    LAST DRUG SCREEN:     3/23  LAST MED CONTRACT:    3/23    Specific Pharmacy location:    [facility]    Date of last appointment:    3/23  Date of next appointment:

## 2023-06-06 NOTE — TELEPHONE ENCOUNTER
Luis Miguel Jasmine, DO  You16 hours ago (2:59 PM)       Okay to pend       Medication(s) pended for Dr. Jasmine

## 2023-06-07 RX ORDER — OXYCODONE AND ACETAMINOPHEN 5; 325 MG/1; MG/1
1-2 TABLET ORAL EVERY 8 HOURS PRN
Qty: 75 TABLET | Refills: 0 | Status: SHIPPED | OUTPATIENT
Start: 2023-06-07 | End: 2023-06-09 | Stop reason: SDUPTHER

## 2023-06-09 DIAGNOSIS — G89.29 CHRONIC RADICULAR LOW BACK PAIN: ICD-10-CM

## 2023-06-09 DIAGNOSIS — M54.16 CHRONIC RADICULAR LOW BACK PAIN: ICD-10-CM

## 2023-06-09 RX ORDER — OXYCODONE AND ACETAMINOPHEN 5; 325 MG/1; MG/1
1-2 TABLET ORAL EVERY 8 HOURS PRN
Qty: 75 TABLET | Refills: 0 | Status: SHIPPED | OUTPATIENT
Start: 2023-06-09 | End: 2023-07-07 | Stop reason: SDUPTHER

## 2023-06-19 DIAGNOSIS — M54.16 RADICULOPATHY, LUMBAR REGION: ICD-10-CM

## 2023-06-19 DIAGNOSIS — G89.29 OTHER CHRONIC PAIN: ICD-10-CM

## 2023-06-21 ENCOUNTER — OFFICE VISIT (OUTPATIENT)
Dept: PRIMARY CARE | Facility: CLINIC | Age: 58
End: 2023-06-21
Payer: COMMERCIAL

## 2023-06-21 VITALS
WEIGHT: 182.8 LBS | HEART RATE: 95 BPM | OXYGEN SATURATION: 99 % | RESPIRATION RATE: 18 BRPM | TEMPERATURE: 98.2 F | BODY MASS INDEX: 26.17 KG/M2 | HEIGHT: 70 IN | DIASTOLIC BLOOD PRESSURE: 64 MMHG | SYSTOLIC BLOOD PRESSURE: 108 MMHG

## 2023-06-21 DIAGNOSIS — M96.1 LUMBAR POSTLAMINECTOMY SYNDROME: ICD-10-CM

## 2023-06-21 DIAGNOSIS — M79.642 PAIN IN BOTH HANDS: ICD-10-CM

## 2023-06-21 DIAGNOSIS — M79.641 PAIN IN BOTH HANDS: ICD-10-CM

## 2023-06-21 DIAGNOSIS — M54.16 CHRONIC RADICULAR LOW BACK PAIN: ICD-10-CM

## 2023-06-21 DIAGNOSIS — R07.89 ATYPICAL CHEST PAIN: ICD-10-CM

## 2023-06-21 DIAGNOSIS — G62.9 NEUROPATHY: ICD-10-CM

## 2023-06-21 DIAGNOSIS — M54.50 LUMBAR PAIN: ICD-10-CM

## 2023-06-21 DIAGNOSIS — E78.5 DYSLIPIDEMIA: ICD-10-CM

## 2023-06-21 DIAGNOSIS — G89.29 CHRONIC RADICULAR LOW BACK PAIN: ICD-10-CM

## 2023-06-21 DIAGNOSIS — M54.9 UPPER BACK PAIN: ICD-10-CM

## 2023-06-21 DIAGNOSIS — Z51.81 THERAPEUTIC DRUG MONITORING: ICD-10-CM

## 2023-06-21 DIAGNOSIS — Z82.49 FAMILY HISTORY OF CARDIAC DISORDER: ICD-10-CM

## 2023-06-21 DIAGNOSIS — M79.10 MYALGIA: ICD-10-CM

## 2023-06-21 PROBLEM — M50.30 DEGENERATIVE DISC DISEASE, CERVICAL: Status: ACTIVE | Noted: 2017-12-02

## 2023-06-21 PROBLEM — M47.817 LUMBOSACRAL SPONDYLOSIS WITHOUT MYELOPATHY: Status: ACTIVE | Noted: 2017-04-06

## 2023-06-21 PROBLEM — M47.816 LUMBAR SPONDYLOSIS: Status: ACTIVE | Noted: 2023-06-21

## 2023-06-21 PROBLEM — M47.812 CERVICAL SPONDYLOSIS WITHOUT MYELOPATHY: Status: ACTIVE | Noted: 2017-01-20

## 2023-06-21 PROBLEM — N52.01 ERECTILE DYSFUNCTION DUE TO ARTERIAL INSUFFICIENCY: Status: ACTIVE | Noted: 2023-06-21

## 2023-06-21 PROBLEM — R42 DIZZY SPELLS: Status: ACTIVE | Noted: 2023-06-21

## 2023-06-21 PROCEDURE — 80307 DRUG TEST PRSMV CHEM ANLYZR: CPT

## 2023-06-21 PROCEDURE — 80358 DRUG SCREENING METHADONE: CPT

## 2023-06-21 PROCEDURE — 80368 SEDATIVE HYPNOTICS: CPT

## 2023-06-21 PROCEDURE — 80346 BENZODIAZEPINES1-12: CPT

## 2023-06-21 PROCEDURE — 80354 DRUG SCREENING FENTANYL: CPT

## 2023-06-21 PROCEDURE — 1036F TOBACCO NON-USER: CPT | Performed by: FAMILY MEDICINE

## 2023-06-21 PROCEDURE — 80361 OPIATES 1 OR MORE: CPT

## 2023-06-21 PROCEDURE — 99214 OFFICE O/P EST MOD 30 MIN: CPT | Performed by: FAMILY MEDICINE

## 2023-06-21 PROCEDURE — 80373 DRUG SCREENING TRAMADOL: CPT

## 2023-06-21 PROCEDURE — 80365 DRUG SCREENING OXYCODONE: CPT

## 2023-06-21 RX ORDER — CYCLOBENZAPRINE HCL 10 MG
TABLET ORAL
Qty: 30 TABLET | Refills: 2 | Status: SHIPPED | OUTPATIENT
Start: 2023-06-21 | End: 2023-09-20

## 2023-06-21 ASSESSMENT — PATIENT HEALTH QUESTIONNAIRE - PHQ9
1. LITTLE INTEREST OR PLEASURE IN DOING THINGS: NOT AT ALL
SUM OF ALL RESPONSES TO PHQ9 QUESTIONS 1 AND 2: 0
2. FEELING DOWN, DEPRESSED OR HOPELESS: NOT AT ALL

## 2023-06-21 ASSESSMENT — PAIN SCALES - GENERAL: PAINLEVEL: 8

## 2023-06-21 NOTE — PROGRESS NOTES
Subjective   Patient ID: Mikey Jean is a 57 y.o. male who presents for Back Pain, Peripheral Neuropathy, and Hand Pain.    HPI    Patient presents today for chronic back pain follow up.  Rates the pain a 8/10 over the past 7 days.  Reports that the medication gives 50% pain control/relief.  OARRS reviewed today.  Controlled substance agreement signed.  Last took medication 7:30 this morning Tramadol, 10 pm last night was Percocet.  Today is a bad due to golfing and doing too much in the yard.    He going to have injections for ablations on Friday.  He also has MRI results in system.  The MRI was on the 15th.  He saw Dr. Luong.  He is no longer seeing Dr. Ramon.  He did try acupuncture and is not sure if it is working yet.    Has been having constant pain in front right chest.  Deep breaths hurt.  States this is ongoing for months.  Denies SOB.  Denies the feeling of someone sitting on chest.  Has had Calcium Score, per patient.   Did not get pain when he was mowing grass the other day.  Does not get pain when golfing.  Brother had  maker.    Would like referral back to Dr. Michael for bilateral hand pain  He has difficulty making fists.  His hands swell.    Review of systems  ; Patient seen today for exam denies any problems with headaches or vision, denies any shortness of breath chest pain nausea or vomiting, no black stool no blood in the stool no heartburn type symptoms denies any problems with constipation or diarrhea, and no dysuria-type symptoms    The patient's allergies medications were reviewed with them today    The patient's social family and surgical history or also reviewed here today, along with her past medical history.     Objective     Alert and active in  no acute distress  HEENT TMs clear oropharynx negative nares clear no drainage noted neck supple  With no adenopathy   Heart regular rate and rhythm without murmur and no carotid bruits  Lungs- clear to auscultation bilaterally, no wheeze or  "rhonchi noted  Thyroid -negative masses or nodularity  Abdomen- soft times four quadrants , bowel sounds positive no masses or organomegaly, negative tenderness guarding or rebound  Neurological exam unremarkable- DTRs in upper and lower extremities within normal limits.   skin -no lesions noted      /64 (BP Location: Left arm, Patient Position: Sitting, BP Cuff Size: Adult)   Pulse 95   Temp 36.8 °C (98.2 °F) (Temporal)   Resp 18   Ht 1.778 m (5' 10\")   Wt 82.9 kg (182 lb 12.8 oz)   SpO2 99%   BMI 26.23 kg/m²     No Known Allergies    Assessment/Plan   Problem List Items Addressed This Visit          Nervous    Chronic radicular low back pain    Neuropathy       Musculoskeletal    Hand pain    Relevant Orders    Referral to Orthopaedic Surgery    Myalgia       Other    Dyslipidemia    Relevant Orders    Referral to Cardiology    Lumbar pain    Lumbar postlaminectomy syndrome    Upper back pain     Other Visit Diagnoses       Therapeutic drug monitoring        Relevant Orders    Opiate/Opioid/Benzo Extended Prescription Compliance    Atypical chest pain        Relevant Orders    Referral to Cardiology    Family history of cardiac disorder        Relevant Orders    Referral to Cardiology          I personally reviewed the OARRS report for this patient. I have considered the risks of abuse, dependence, addiction, and diversion.  CSA 3/24/23  UDS 6/21/23    Does not need refills today.    Referral to Dr. Tabor ordered.    Referral to Dr. Rhoades ordered.    Reviewed MRI.    If anything worsens or changes please call us at once, follow up in the office as planned.    Scribe Attestation  By signing my name below, I, Rosa Kendall MA, Scribe   attest that this documentation has been prepared under the direction and in the presence of Luis Miguel Jasmine DO.      "

## 2023-06-23 ENCOUNTER — HOSPITAL ENCOUNTER (OUTPATIENT)
Dept: DATA CONVERSION | Facility: HOSPITAL | Age: 58
End: 2023-06-23
Attending: ANESTHESIOLOGY | Admitting: ANESTHESIOLOGY
Payer: COMMERCIAL

## 2023-06-23 DIAGNOSIS — M47.816 SPONDYLOSIS WITHOUT MYELOPATHY OR RADICULOPATHY, LUMBAR REGION: ICD-10-CM

## 2023-06-27 LAB
6-ACETYLMORPHINE: <25 NG/ML
7-AMINOCLONAZEPAM: <25 NG/ML
ALPHA-HYDROXYALPRAZOLAM: <25 NG/ML
ALPHA-HYDROXYMIDAZOLAM: <25 NG/ML
ALPRAZOLAM: <25 NG/ML
AMPHETAMINE (PRESENCE) IN URINE BY SCREEN METHOD: ABNORMAL
BARBITURATES PRESENCE IN URINE BY SCREEN METHOD: ABNORMAL
CANNABINOIDS IN URINE BY SCREEN METHOD: ABNORMAL
CHLORDIAZEPOXIDE: <25 NG/ML
CLONAZEPAM: <25 NG/ML
COCAINE (PRESENCE) IN URINE BY SCREEN METHOD: ABNORMAL
CODEINE: <50 NG/ML
CREATINE, URINE FOR DRUG: 108.5 MG/DL
DIAZEPAM: <25 NG/ML
DRUG SCREEN COMMENT URINE: ABNORMAL
EDDP: <25 NG/ML
FENTANYL CONFIRMATION, URINE: <2.5 NG/ML
HYDROCODONE: <25 NG/ML
HYDROMORPHONE: <25 NG/ML
LORAZEPAM: <25 NG/ML
METHADONE CONFIRMATION,URINE: <25 NG/ML
MIDAZOLAM: <25 NG/ML
MORPHINE URINE: <50 NG/ML
NORDIAZEPAM: <25 NG/ML
NORFENTANYL: <2.5 NG/ML
NORHYDROCODONE: <25 NG/ML
NOROXYCODONE: >1000 NG/ML
O-DESMETHYLTRAMADOL: >1000 NG/ML
OXAZEPAM: <25 NG/ML
OXYCODONE: 499 NG/ML
OXYMORPHONE: 351 NG/ML
PHENCYCLIDINE (PRESENCE) IN URINE BY SCREEN METHOD: ABNORMAL
TEMAZEPAM: <25 NG/ML
TRAMADOL: >1000 NG/ML
ZOLPIDEM METABOLITE (ZCA): <25 NG/ML
ZOLPIDEM: <25 NG/ML

## 2023-06-28 DIAGNOSIS — M54.9 UPPER BACK PAIN: ICD-10-CM

## 2023-06-28 DIAGNOSIS — G62.9 NEUROPATHY: ICD-10-CM

## 2023-06-28 DIAGNOSIS — M54.50 LUMBAR PAIN: ICD-10-CM

## 2023-06-28 RX ORDER — GABAPENTIN 300 MG/1
CAPSULE ORAL
Qty: 150 CAPSULE | Refills: 0 | Status: SHIPPED | OUTPATIENT
Start: 2023-06-28 | End: 2023-07-31 | Stop reason: SDUPTHER

## 2023-06-28 RX ORDER — TRAMADOL HYDROCHLORIDE 50 MG/1
TABLET ORAL
Qty: 60 TABLET | Refills: 0 | Status: SHIPPED | OUTPATIENT
Start: 2023-06-28 | End: 2023-07-31 | Stop reason: SDUPTHER

## 2023-07-07 DIAGNOSIS — G89.29 CHRONIC RADICULAR LOW BACK PAIN: ICD-10-CM

## 2023-07-07 DIAGNOSIS — M54.16 CHRONIC RADICULAR LOW BACK PAIN: ICD-10-CM

## 2023-07-07 RX ORDER — OXYCODONE AND ACETAMINOPHEN 5; 325 MG/1; MG/1
1-2 TABLET ORAL EVERY 8 HOURS PRN
Qty: 75 TABLET | Refills: 0 | Status: SHIPPED | OUTPATIENT
Start: 2023-07-07 | End: 2023-08-04 | Stop reason: SDUPTHER

## 2023-07-07 NOTE — TELEPHONE ENCOUNTER
----- Message from Mikey Jean sent at 7/6/2023 11:35 AM EDT -----  Regarding: Refill  Contact: [phone]  Good morning. i was trying to do a refill of my percocet online and couldn't get it to take. [facility] [place] [facility]. [facility]?  Thank you, Mikey Jean

## 2023-07-31 DIAGNOSIS — G62.9 NEUROPATHY: ICD-10-CM

## 2023-07-31 DIAGNOSIS — M54.50 LUMBAR PAIN: ICD-10-CM

## 2023-07-31 DIAGNOSIS — M54.9 UPPER BACK PAIN: ICD-10-CM

## 2023-07-31 RX ORDER — TRAMADOL HYDROCHLORIDE 50 MG/1
50 TABLET ORAL 2 TIMES DAILY PRN
Qty: 60 TABLET | Refills: 0 | Status: SHIPPED | OUTPATIENT
Start: 2023-07-31 | End: 2023-08-30 | Stop reason: SDUPTHER

## 2023-07-31 RX ORDER — GABAPENTIN 300 MG/1
CAPSULE ORAL
Qty: 150 CAPSULE | Refills: 0 | Status: SHIPPED | OUTPATIENT
Start: 2023-07-31 | End: 2023-08-30

## 2023-07-31 NOTE — TELEPHONE ENCOUNTER
----- Message from Mikey Jean sent at 7/31/2023  8:57 AM EDT -----  Regarding: Refill  Contact: [phone]  Good morning    I cannot seem to figure out the medication refill page.  I am out of Tramadol and the Gabapentin behind.  Anything you can do is appreciated. Thank you, Mikey

## 2023-08-04 ENCOUNTER — HOSPITAL ENCOUNTER (OUTPATIENT)
Dept: DATA CONVERSION | Facility: HOSPITAL | Age: 58
End: 2023-08-04
Attending: ANESTHESIOLOGY | Admitting: ANESTHESIOLOGY
Payer: COMMERCIAL

## 2023-08-04 DIAGNOSIS — M54.50 LOW BACK PAIN, UNSPECIFIED: ICD-10-CM

## 2023-08-04 DIAGNOSIS — M54.16 CHRONIC RADICULAR LOW BACK PAIN: ICD-10-CM

## 2023-08-04 DIAGNOSIS — M53.3 SACROCOCCYGEAL DISORDERS, NOT ELSEWHERE CLASSIFIED: ICD-10-CM

## 2023-08-04 DIAGNOSIS — G89.29 CHRONIC RADICULAR LOW BACK PAIN: ICD-10-CM

## 2023-08-04 RX ORDER — OXYCODONE AND ACETAMINOPHEN 5; 325 MG/1; MG/1
1-2 TABLET ORAL EVERY 8 HOURS PRN
Qty: 75 TABLET | Refills: 0 | Status: SHIPPED | OUTPATIENT
Start: 2023-08-04 | End: 2023-09-07 | Stop reason: SDUPTHER

## 2023-08-04 NOTE — TELEPHONE ENCOUNTER
----- Message from Mikey Jean sent at 8/4/2023  3:18 PM EDT -----  Regarding: Refill  Contact: [phone]  Good afternoon.  I forgot to do this earlier today. I am in need of filling my Percocet prescription. I know Brad is out today, so hopefully, someone sees this on Monday.    Thank you,  Mikey

## 2023-08-21 ENCOUNTER — OFFICE VISIT (OUTPATIENT)
Dept: PRIMARY CARE | Facility: CLINIC | Age: 58
End: 2023-08-21
Payer: COMMERCIAL

## 2023-08-21 VITALS
HEIGHT: 70 IN | OXYGEN SATURATION: 96 % | BODY MASS INDEX: 25.34 KG/M2 | HEART RATE: 88 BPM | TEMPERATURE: 97.5 F | DIASTOLIC BLOOD PRESSURE: 82 MMHG | WEIGHT: 177 LBS | RESPIRATION RATE: 16 BRPM | SYSTOLIC BLOOD PRESSURE: 138 MMHG

## 2023-08-21 DIAGNOSIS — R07.89 ATYPICAL CHEST PAIN: ICD-10-CM

## 2023-08-21 DIAGNOSIS — Z12.11 COLON CANCER SCREENING: ICD-10-CM

## 2023-08-21 DIAGNOSIS — G62.9 NEUROPATHY: ICD-10-CM

## 2023-08-21 DIAGNOSIS — R10.9 FLANK PAIN: ICD-10-CM

## 2023-08-21 DIAGNOSIS — R63.4 WEIGHT LOSS: ICD-10-CM

## 2023-08-21 DIAGNOSIS — R35.0 URINARY FREQUENCY: ICD-10-CM

## 2023-08-21 LAB
POC APPEARANCE, URINE: CLEAR
POC BILIRUBIN, URINE: NEGATIVE
POC BLOOD, URINE: NEGATIVE
POC COLOR, URINE: YELLOW
POC GLUCOSE, URINE: NEGATIVE MG/DL
POC KETONES, URINE: NEGATIVE MG/DL
POC LEUKOCYTES, URINE: NEGATIVE
POC NITRITE,URINE: NEGATIVE
POC PH, URINE: 7.5 PH
POC PROTEIN, URINE: NEGATIVE MG/DL
POC SPECIFIC GRAVITY, URINE: 1.01
POC UROBILINOGEN, URINE: 0.2 EU/DL

## 2023-08-21 PROCEDURE — 3008F BODY MASS INDEX DOCD: CPT | Performed by: FAMILY MEDICINE

## 2023-08-21 PROCEDURE — 1036F TOBACCO NON-USER: CPT | Performed by: FAMILY MEDICINE

## 2023-08-21 PROCEDURE — 81003 URINALYSIS AUTO W/O SCOPE: CPT | Performed by: FAMILY MEDICINE

## 2023-08-21 PROCEDURE — 99214 OFFICE O/P EST MOD 30 MIN: CPT | Performed by: FAMILY MEDICINE

## 2023-08-21 NOTE — PROGRESS NOTES
"Subjective   Patient ID: Mikey Jean is a 57 y.o. male who presents for Leg Pain and Urinary Frequency.    HPI    Pt is here for recurring bilateral legs pain  Ongoing for 2 week ago  Describe the pain cramping, tight pain  Pt rates pain level from 5 to 9/ 10  Pt is taking Percocet with out relief  States the pain is \"solid.\"  He has to get up and try to walk it off.    Pt is also having left side abdominal pain   Pt feeling tightening of chest due side pain  Pt was hit by a golf ball in the left shoulder  Sees Dr. Luong for pain management.  He did get injections in SI joints, which did not help.  His thoracic is still painful.  He can take a deep breath, let out a deep breath, or certain movements can cause the pain.  Denies numbness or tingling in the arms.  Has a follow up with Dr. Tabor in a week and a half.  Gets the pain when he sits around.  Ongoing x 2 weeks.  Denies SOB.  Chest/flank pain was present prior to getting hit with golf ball.  Denies abdominal pain.  Bowels normal.    urinary frequency  States that this has been going on for x 3 month  States that there is not burning with urination  States that there is not pain with urination  States that there is lower back pain  States that there is not visible blood   Does drink an energy drink in the morning.    He was given 2 names for rheumatology.  He is going to get a second opinion.    He has been having weight loss.  States it is 9 pounds.  Denies any abdominal pain or bowel issues.  No diarrhea.  He has been trying to increase food intake.  He has been drink alcohol.  He has been trying to increase water and Gatorade intake.    No other concern        Review of systems  ; Patient seen today for exam denies any problems with headaches or vision, denies any shortness of breath chest pain nausea or vomiting, no black stool no blood in the stool no heartburn type symptoms denies any problems with constipation or diarrhea, and no dysuria-type " "symptoms    The patient's allergies medications were reviewed with them today    The patient's social family and surgical history or also reviewed here today, along with her past medical history.     Objective     Alert and active in  no acute distress  HEENT TMs clear oropharynx negative nares clear no drainage noted neck supple  With no adenopathy   Heart regular rate and rhythm without murmur and no carotid bruits  Lungs- clear to auscultation bilaterally, no wheeze or rhonchi noted  Thyroid -negative masses or nodularity  Abdomen- soft times four quadrants , bowel sounds positive no masses or organomegaly, negative tenderness guarding or rebound  Neurological exam unremarkable- DTRs in upper and lower extremities within normal limits.   skin -no lesions noted      /82 (BP Location: Left arm, Patient Position: Sitting, BP Cuff Size: Adult)   Pulse 88   Temp 36.4 °C (97.5 °F) (Temporal)   Resp 16   Ht 1.778 m (5' 10\")   Wt 80.3 kg (177 lb)   SpO2 96%   BMI 25.40 kg/m²     No Known Allergies    Assessment/Plan   Problem List Items Addressed This Visit       Neuropathy    Relevant Orders    Comprehensive Metabolic Panel    CBC and Auto Differential    Magnesium    BMI 25.0-25.9,adult    Urinary frequency    Relevant Orders    POCT UA Automated manually resulted (Completed)    Colon cancer screening    Relevant Orders    Cologuard® colon cancer screening     Other Visit Diagnoses       Flank pain        Relevant Orders    XR chest 2 views    Atypical chest pain        Relevant Orders    XR chest 2 views    Weight loss        Relevant Orders    Comprehensive Metabolic Panel    CBC and Auto Differential    TSH with reflex to Free T4 if abnormal          UA performed, unremarkable.    Reviewed MRI of thoracic spine.  Agree that he should get a second opinion with rheumatologist.    OMT given thoracic/lumbar spine HVLA technique.    Cologuard ordered.    Chest x-ray ordered.    He can try stopping Livalo for " a month to see if the pain decreases or stops.  If this is the cause of his pain, he can try a different medication for his cholesterol.    Continue to follow up with Dr. Tabor as scheduled.  He should get a stress test.    Increase Magnesium to 2 tablets at night.  If spasm do not improve, he would need an additional MRI of back.    Recommend less alcohol intake.    Labs have been ordered, she/he will have these performed and we will contact her/him with results.  (CBC, CMP, TSH w Reflex, Magnesium)    If anything worsens or changes please call us at once, follow up in the office as planned.    Scribe Attestation  By signing my name below, I, Rosa Kendall MA, Scribe   attest that this documentation has been prepared under the direction and in the presence of Luis Miguel Jasmine DO.

## 2023-08-22 ENCOUNTER — LAB (OUTPATIENT)
Dept: LAB | Facility: LAB | Age: 58
End: 2023-08-22
Payer: COMMERCIAL

## 2023-08-22 DIAGNOSIS — G62.9 NEUROPATHY: ICD-10-CM

## 2023-08-22 DIAGNOSIS — R63.4 WEIGHT LOSS: ICD-10-CM

## 2023-08-22 LAB
ALANINE AMINOTRANSFERASE (SGPT) (U/L) IN SER/PLAS: 36 U/L (ref 10–52)
ALBUMIN (G/DL) IN SER/PLAS: 4.5 G/DL (ref 3.4–5)
ALKALINE PHOSPHATASE (U/L) IN SER/PLAS: 57 U/L (ref 33–120)
ANION GAP IN SER/PLAS: 13 MMOL/L (ref 10–20)
ASPARTATE AMINOTRANSFERASE (SGOT) (U/L) IN SER/PLAS: 33 U/L (ref 9–39)
BASOPHILS (10*3/UL) IN BLOOD BY AUTOMATED COUNT: 0.03 X10E9/L (ref 0–0.1)
BASOPHILS/100 LEUKOCYTES IN BLOOD BY AUTOMATED COUNT: 0.4 % (ref 0–2)
BILIRUBIN TOTAL (MG/DL) IN SER/PLAS: 0.5 MG/DL (ref 0–1.2)
CALCIUM (MG/DL) IN SER/PLAS: 10 MG/DL (ref 8.6–10.3)
CARBON DIOXIDE, TOTAL (MMOL/L) IN SER/PLAS: 31 MMOL/L (ref 21–32)
CHLORIDE (MMOL/L) IN SER/PLAS: 99 MMOL/L (ref 98–107)
CREATININE (MG/DL) IN SER/PLAS: 0.94 MG/DL (ref 0.5–1.3)
EOSINOPHILS (10*3/UL) IN BLOOD BY AUTOMATED COUNT: 0.09 X10E9/L (ref 0–0.7)
EOSINOPHILS/100 LEUKOCYTES IN BLOOD BY AUTOMATED COUNT: 1.1 % (ref 0–6)
ERYTHROCYTE DISTRIBUTION WIDTH (RATIO) BY AUTOMATED COUNT: 14.4 % (ref 11.5–14.5)
ERYTHROCYTE MEAN CORPUSCULAR HEMOGLOBIN CONCENTRATION (G/DL) BY AUTOMATED: 31.7 G/DL (ref 32–36)
ERYTHROCYTE MEAN CORPUSCULAR VOLUME (FL) BY AUTOMATED COUNT: 102 FL (ref 80–100)
ERYTHROCYTES (10*6/UL) IN BLOOD BY AUTOMATED COUNT: 4.51 X10E12/L (ref 4.5–5.9)
GFR MALE: >90 ML/MIN/1.73M2
GLUCOSE (MG/DL) IN SER/PLAS: 99 MG/DL (ref 74–99)
HEMATOCRIT (%) IN BLOOD BY AUTOMATED COUNT: 46 % (ref 41–52)
HEMOGLOBIN (G/DL) IN BLOOD: 14.6 G/DL (ref 13.5–17.5)
IMMATURE GRANULOCYTES/100 LEUKOCYTES IN BLOOD BY AUTOMATED COUNT: 0.2 % (ref 0–0.9)
LEUKOCYTES (10*3/UL) IN BLOOD BY AUTOMATED COUNT: 8.1 X10E9/L (ref 4.4–11.3)
LYMPHOCYTES (10*3/UL) IN BLOOD BY AUTOMATED COUNT: 2.37 X10E9/L (ref 1.2–4.8)
LYMPHOCYTES/100 LEUKOCYTES IN BLOOD BY AUTOMATED COUNT: 29.4 % (ref 13–44)
MAGNESIUM (MG/DL) IN SER/PLAS: 2.47 MG/DL (ref 1.6–2.4)
MONOCYTES (10*3/UL) IN BLOOD BY AUTOMATED COUNT: 0.72 X10E9/L (ref 0.1–1)
MONOCYTES/100 LEUKOCYTES IN BLOOD BY AUTOMATED COUNT: 8.9 % (ref 2–10)
NEUTROPHILS (10*3/UL) IN BLOOD BY AUTOMATED COUNT: 4.82 X10E9/L (ref 1.2–7.7)
NEUTROPHILS/100 LEUKOCYTES IN BLOOD BY AUTOMATED COUNT: 60 % (ref 40–80)
PLATELETS (10*3/UL) IN BLOOD AUTOMATED COUNT: 233 X10E9/L (ref 150–450)
POTASSIUM (MMOL/L) IN SER/PLAS: 4.5 MMOL/L (ref 3.5–5.3)
PROTEIN TOTAL: 7 G/DL (ref 6.4–8.2)
SODIUM (MMOL/L) IN SER/PLAS: 138 MMOL/L (ref 136–145)
THYROTROPIN (MIU/L) IN SER/PLAS BY DETECTION LIMIT <= 0.05 MIU/L: 2.48 MIU/L (ref 0.44–3.98)
UREA NITROGEN (MG/DL) IN SER/PLAS: 18 MG/DL (ref 6–23)

## 2023-08-22 PROCEDURE — 80053 COMPREHEN METABOLIC PANEL: CPT

## 2023-08-22 PROCEDURE — 85025 COMPLETE CBC W/AUTO DIFF WBC: CPT

## 2023-08-22 PROCEDURE — 84443 ASSAY THYROID STIM HORMONE: CPT

## 2023-08-22 PROCEDURE — 36415 COLL VENOUS BLD VENIPUNCTURE: CPT

## 2023-08-22 PROCEDURE — 83735 ASSAY OF MAGNESIUM: CPT

## 2023-08-26 DIAGNOSIS — M54.16 RADICULOPATHY, LUMBAR REGION: ICD-10-CM

## 2023-08-26 DIAGNOSIS — G89.29 OTHER CHRONIC PAIN: ICD-10-CM

## 2023-08-28 RX ORDER — MELOXICAM 15 MG/1
15 TABLET ORAL DAILY
Qty: 30 TABLET | Refills: 5 | Status: SHIPPED | OUTPATIENT
Start: 2023-08-28 | End: 2024-02-16

## 2023-08-30 DIAGNOSIS — M54.50 LUMBAR PAIN: ICD-10-CM

## 2023-08-30 DIAGNOSIS — M54.9 UPPER BACK PAIN: ICD-10-CM

## 2023-08-30 DIAGNOSIS — G62.9 NEUROPATHY: ICD-10-CM

## 2023-08-30 RX ORDER — TRAMADOL HYDROCHLORIDE 50 MG/1
50 TABLET ORAL 2 TIMES DAILY PRN
Qty: 60 TABLET | Refills: 0 | Status: SHIPPED | OUTPATIENT
Start: 2023-08-30 | End: 2023-10-02 | Stop reason: SDUPTHER

## 2023-08-30 RX ORDER — GABAPENTIN 300 MG/1
CAPSULE ORAL
Qty: 150 CAPSULE | Refills: 0 | Status: SHIPPED | OUTPATIENT
Start: 2023-08-30 | End: 2023-10-02 | Stop reason: SDUPTHER

## 2023-09-04 LAB — NONINV COLON CA DNA+OCC BLD SCRN STL QL: NEGATIVE

## 2023-09-07 VITALS — BODY MASS INDEX: 25.53 KG/M2 | HEIGHT: 70 IN | WEIGHT: 178.35 LBS

## 2023-09-07 DIAGNOSIS — G89.29 CHRONIC RADICULAR LOW BACK PAIN: ICD-10-CM

## 2023-09-07 DIAGNOSIS — M54.16 CHRONIC RADICULAR LOW BACK PAIN: ICD-10-CM

## 2023-09-07 RX ORDER — OXYCODONE AND ACETAMINOPHEN 5; 325 MG/1; MG/1
1-2 TABLET ORAL EVERY 8 HOURS PRN
Qty: 75 TABLET | Refills: 0 | Status: SHIPPED | OUTPATIENT
Start: 2023-09-07 | End: 2023-10-10 | Stop reason: SDUPTHER

## 2023-09-07 NOTE — TELEPHONE ENCOUNTER
Rx Refill Request Telephone Encounter    Name: Mikey Jean  :  1965    Medication Name:   Percocet   Dose (Optional):   5-325mg  Quantity (Optional):   #75  Directions (Optional):   Take 1-2 tablets by mouth every 8 hours prn for severe pain (7-10)    ALLERGIES:    nkda    LAST DRUG SCREEN:     23  LAST MED CONTRACT:    23    Specific Pharmacy location:    [facility]    Date of last appointment:    23  Date of next appointment:    none    Best number to reach patient:    [phone]

## 2023-09-17 DIAGNOSIS — M79.10 MYALGIA: ICD-10-CM

## 2023-09-18 RX ORDER — DULOXETIN HYDROCHLORIDE 60 MG/1
60 CAPSULE, DELAYED RELEASE ORAL DAILY
Qty: 30 CAPSULE | Refills: 5 | Status: SHIPPED | OUTPATIENT
Start: 2023-09-18 | End: 2024-03-04

## 2023-09-19 DIAGNOSIS — G89.29 OTHER CHRONIC PAIN: ICD-10-CM

## 2023-09-19 DIAGNOSIS — M54.16 RADICULOPATHY, LUMBAR REGION: ICD-10-CM

## 2023-09-20 RX ORDER — CYCLOBENZAPRINE HCL 10 MG
TABLET ORAL
Qty: 30 TABLET | Refills: 2 | Status: SHIPPED | OUTPATIENT
Start: 2023-09-20 | End: 2024-01-18

## 2023-09-22 ENCOUNTER — HOSPITAL ENCOUNTER (OUTPATIENT)
Dept: DATA CONVERSION | Facility: HOSPITAL | Age: 58
End: 2023-09-22
Attending: ANESTHESIOLOGY | Admitting: ANESTHESIOLOGY
Payer: COMMERCIAL

## 2023-09-22 DIAGNOSIS — M47.816 SPONDYLOSIS WITHOUT MYELOPATHY OR RADICULOPATHY, LUMBAR REGION: ICD-10-CM

## 2023-09-29 VITALS — HEIGHT: 70 IN | WEIGHT: 178.35 LBS | BODY MASS INDEX: 25.53 KG/M2

## 2023-09-29 VITALS — WEIGHT: 178.35 LBS | HEIGHT: 70 IN | BODY MASS INDEX: 25.53 KG/M2

## 2023-10-02 ENCOUNTER — LAB (OUTPATIENT)
Dept: LAB | Facility: LAB | Age: 58
End: 2023-10-02
Payer: COMMERCIAL

## 2023-10-02 DIAGNOSIS — G62.9 NEUROPATHY: ICD-10-CM

## 2023-10-02 DIAGNOSIS — M54.50 LUMBAR PAIN: ICD-10-CM

## 2023-10-02 DIAGNOSIS — M54.9 UPPER BACK PAIN: ICD-10-CM

## 2023-10-02 RX ORDER — TRAMADOL HYDROCHLORIDE 50 MG/1
50 TABLET ORAL 2 TIMES DAILY PRN
Qty: 60 TABLET | Refills: 0 | Status: SHIPPED | OUTPATIENT
Start: 2023-10-02 | End: 2023-11-01 | Stop reason: SDUPTHER

## 2023-10-02 RX ORDER — GABAPENTIN 300 MG/1
CAPSULE ORAL
Qty: 150 CAPSULE | Refills: 0 | Status: SHIPPED | OUTPATIENT
Start: 2023-10-02 | End: 2023-11-01 | Stop reason: SDUPTHER

## 2023-10-02 NOTE — TELEPHONE ENCOUNTER
Mikey SPARROW Do [id number] [facility] Clinical Support Staff (supporting Luis Miguel Jasmine DO)3 days ago       Good morning  I know Brad is off tomorrow   I will need a refill on my gabapentin and tramadol  Thank you  Mikey

## 2023-10-06 ENCOUNTER — LAB (OUTPATIENT)
Dept: LAB | Facility: LAB | Age: 58
End: 2023-10-06
Payer: COMMERCIAL

## 2023-10-06 DIAGNOSIS — I10 ESSENTIAL (PRIMARY) HYPERTENSION: Primary | ICD-10-CM

## 2023-10-06 DIAGNOSIS — I10 ESSENTIAL (PRIMARY) HYPERTENSION: ICD-10-CM

## 2023-10-06 LAB
CHOLEST SERPL-MCNC: 235 MG/DL (ref 0–199)
CHOLESTEROL/HDL RATIO: 3.6
HDLC SERPL-MCNC: 65 MG/DL
LDLC SERPL CALC-MCNC: 113 MG/DL (ref 140–190)
NON HDL CHOLESTEROL: 170 MG/DL (ref 0–149)
TRIGL SERPL-MCNC: 286 MG/DL (ref 0–149)
VLDL: 57 MG/DL (ref 0–40)

## 2023-10-06 PROCEDURE — 80061 LIPID PANEL: CPT

## 2023-10-06 PROCEDURE — 36415 COLL VENOUS BLD VENIPUNCTURE: CPT

## 2023-10-10 DIAGNOSIS — G89.29 CHRONIC RADICULAR LOW BACK PAIN: ICD-10-CM

## 2023-10-10 DIAGNOSIS — M54.16 CHRONIC RADICULAR LOW BACK PAIN: ICD-10-CM

## 2023-10-10 NOTE — TELEPHONE ENCOUNTER
Last seen 8-21-23      Mikey Jean Do [id number] [place] [address] Clinical Support Staff  Phone Number: [phone]     [facility]. Can I pleae get a refill on my Percocet?  I understand I need to get an appointment with Dr. Allison as well.    Thank you,  Mikey

## 2023-10-11 RX ORDER — OXYCODONE AND ACETAMINOPHEN 5; 325 MG/1; MG/1
1-2 TABLET ORAL EVERY 8 HOURS PRN
Qty: 75 TABLET | Refills: 0 | Status: SHIPPED | OUTPATIENT
Start: 2023-10-11 | End: 2023-11-03 | Stop reason: SDUPTHER

## 2023-10-13 DIAGNOSIS — Z78.9 STATIN INTOLERANCE: ICD-10-CM

## 2023-10-13 NOTE — TELEPHONE ENCOUNTER
Advised pt verbalized understanding. Rx sent to Dr. Dalton Tabor MD, [facility] for approval. KaitConradoVIET                ----- Message from Dalton Tabor MD sent at 10/13/2023  2:39 PM EDT -----  Labs reviewed and show that his cholesterol is significantly increased at 235.  LDL is 113.  He is completely intolerant to statins.  He has underlying atherosclerotic heart disease with significantly elevated coronary calcium score.  His target LDL level is less than 70.Would like to have him prescribed Repatha 140 mg subcutaneously every 2 weeks due to his complete intolerance of statins.  Follow-up as scheduled.  Thanks.

## 2023-10-17 RX ORDER — EVOLOCUMAB 140 MG/ML
140 INJECTION, SOLUTION SUBCUTANEOUS
Qty: 2 EACH | Refills: 3 | Status: SHIPPED | OUTPATIENT
Start: 2023-10-17 | End: 2023-10-24

## 2023-10-18 ENCOUNTER — TELEPHONE (OUTPATIENT)
Dept: PRIMARY CARE | Facility: CLINIC | Age: 58
End: 2023-10-18
Payer: COMMERCIAL

## 2023-10-24 ENCOUNTER — OFFICE VISIT (OUTPATIENT)
Dept: CARDIOLOGY | Facility: CLINIC | Age: 58
End: 2023-10-24
Payer: COMMERCIAL

## 2023-10-24 VITALS
HEART RATE: 74 BPM | DIASTOLIC BLOOD PRESSURE: 68 MMHG | WEIGHT: 182 LBS | BODY MASS INDEX: 26.05 KG/M2 | SYSTOLIC BLOOD PRESSURE: 122 MMHG | HEIGHT: 70 IN

## 2023-10-24 DIAGNOSIS — E78.5 DYSLIPIDEMIA: ICD-10-CM

## 2023-10-24 DIAGNOSIS — M79.10 MYALGIA: ICD-10-CM

## 2023-10-24 DIAGNOSIS — I25.10 ATHEROSCLEROSIS OF NATIVE CORONARY ARTERY OF NATIVE HEART WITHOUT ANGINA PECTORIS: Primary | ICD-10-CM

## 2023-10-24 PROBLEM — R93.1 AGATSTON CAC SCORE, >400: Status: ACTIVE | Noted: 2023-10-24

## 2023-10-24 PROBLEM — Z78.9 STATIN INTOLERANCE: Status: ACTIVE | Noted: 2023-10-24

## 2023-10-24 PROCEDURE — 99213 OFFICE O/P EST LOW 20 MIN: CPT | Performed by: INTERNAL MEDICINE

## 2023-10-24 PROCEDURE — 1036F TOBACCO NON-USER: CPT | Performed by: INTERNAL MEDICINE

## 2023-10-24 PROCEDURE — 3008F BODY MASS INDEX DOCD: CPT | Performed by: INTERNAL MEDICINE

## 2023-10-24 RX ORDER — ROSUVASTATIN CALCIUM 10 MG/1
10 TABLET, COATED ORAL DAILY
Qty: 90 TABLET | Refills: 3 | Status: SHIPPED | OUTPATIENT
Start: 2023-10-24 | End: 2024-10-23

## 2023-10-24 NOTE — PROGRESS NOTES
Patient:  Mikey Jean  YOB: 1965  MRN: [id number]       HPI:       Mikey Jean is a 58 y.o. male who returns today for cardiac follow-up.  He was seen on August 29, 2023 for evaluation of chest discomfort.  He has atherosclerotic heart disease with previous CT coronary calcium score August 5, 2021 of 669.  He does not have any history of angina pectoris, myocardial infarction, or angioplasty procedures. No history of hypertension, diabetes mellitus, or tobacco use. He notes that his brother had a myocardial infarction at the age of 57.   Mikey has been intolerant to atorvastatin, pravastatin, and pitavastatin.      He has a history of chronic back pain. He is currently maintained on gabapentin, tramadol, methocarbamol, and oxycodone. He has both cervical and lumbar disc disease. He has chronic pain related to cervical disc disease and he feels this makes it difficult to sort out if he is having any heart issues.  At the time of his initial visit he described at least 3 different types of chest discomfort. He notes some sharp pains on the left side of his chest that are worse with movement. He describes intermittent aching and pressure-like discomfort in the upper chest as well. He also has some sharp pains on the right side of his chest. These pains are worsened with deep inspiration. He has discomfort both at rest and with exertional activity. Despite his back issues he tries to golf on a regular basis.     His EKG showed normal sinus rhythm with incomplete right bundle branch block. He notes his heart rates tend to run in the 90s to low 100s. Lab studies dated August 22, 2023 showed a normal CBC and comprehensive metabolic profile. TSH was normal. Coronary CT angiogram on September 15, 2023 showed mild diffuse disease without significant stenosis.  Coronary CT calcium score 932 (97% CABRALES risk).    He denies any shortness of breath. He denies any orthopnea, PND, or increasing peripheral edema. He  denies any palpitations, lightheadedness, near-syncope, or syncope. He denies any fever, chills, or cough. He denies any nausea, vomiting, or diaphoresis. He denies any hemoptysis, hematemesis, melena, or hematochezia. Lab studies dated October 6, 2023 showed a cholesterol 235 with , triglycerides 286, and .  Basic metabolic profile on August 22, 2023 was normal.  Lipid profile on December 7, 2022 showed cholesterol 169, HDL 66, LDL 34, and triglycerides 346.   This was done while he was taking atorvastatin.      I spoke with Mikey in detail regarding results of the coronary CT angiogram.  He has extensive coronary calcification but no evidence of flow-limiting blockages.  An initial attempt to get Repatha covered by his insurance company was denied.  He is willing to try low-dose rosuvastatin 10 mg daily.  If he is intolerant to this we will make an additional attempt to get Repatha covered.  Alternatives would be Praluent or Leqvio.  Other details as noted below.    The above portion of this note was dictated by me using voice recognition software. I personally performed the services described in the documentation. The scribe entering the documentation below was in my presence. I affirm that the information is both accurate and complete.      Objective:     Vitals:    10/24/23 0849   BP: 122/68   Pulse: 74       Wt Readings from Last 4 Encounters:   10/24/23 82.6 kg (182 lb)   08/21/23 80.3 kg (177 lb)   08/01/23 83 kg (183 lb)   06/21/23 82.9 kg (182 lb 12.8 oz)       Allergies:     No Known Allergies     Medications:     Current Outpatient Medications   Medication Instructions    cholecalciferol (VITAMIN D-3) 25 mcg, oral, Daily, Take as directed    cyclobenzaprine (Flexeril) 10 mg tablet TAKE 1/2 TO 1 TABLET BY MOUTH NIGHTLY AS NEEDED    DULoxetine (CYMBALTA) 60 mg, oral, Daily, Do not crush or chew.     gabapentin (Neurontin) 300 mg capsule 1 CAPSULE IN AM, 1 CAPSULE IN PM AND 3 CAPSULES AT NIGHT     magnesium oxide (MAG-OX) 400 mg, oral, Daily    meloxicam (MOBIC) 15 mg, oral, Daily    methocarbamol (ROBAXIN) 750 mg, oral, 3 times daily PRN    multivitamin (Multiple Vitamins) tablet 1 tablet, oral, Daily    oxyCODONE-acetaminophen (Percocet) 5-325 mg tablet 1-2 tablets, oral, Every 8 hours PRN    traMADol (ULTRAM) 50 mg, oral, 2 times daily PRN       Physical Examination:   GENERAL:  Well developed, well nourished, in no acute distress.  CHEST:  Symmetric and nontender.  NEURO/PSYCH:  Alert and oriented times three with approppriate behavior and responses.  NECK:  Supple, no JVD, no bruit.  LUNGS:  Clear to auscultation bilaterally, normal respiratory effort.  HEART:  Rate and rhythm regular with no evident murmur, no gallop appreciated.        There are no rubs, clicks or heaves.  EXTREMITIES:  Warm with good color, no clubbing or cyanosis.  There is no edema noted.  PERIPHERAL VASCULAR:  Pulses present and equally palpable; 2+ throughout.      Lab:     CBC:   Lab Results   Component Value Date    WBC 8.1 08/22/2023    RBC 4.51 08/22/2023    HGB 14.6 08/22/2023    HCT 46.0 08/22/2023     08/22/2023        CMP:    Lab Results   Component Value Date     08/22/2023    K 4.5 08/22/2023    CL 99 08/22/2023    CO2 31 08/22/2023    BUN 18 08/22/2023    CREATININE 0.94 08/22/2023    GLUCOSE 99 08/22/2023    CALCIUM 10.0 08/22/2023       Magnesium:    Lab Results   Component Value Date    MG 2.47 (H) 08/22/2023       Lipid Profile:    Lab Results   Component Value Date    TRIG 286 (H) 10/06/2023    HDL 65.0 10/06/2023    LDLCALC 113 (L) 10/06/2023       TSH:    Lab Results   Component Value Date    TSH 2.48 08/22/2023       PT/INR:    Lab Results   Component Value Date    PROTIME 10.4 09/21/2022    INR 0.9 09/21/2022       HgBA1c:    Lab Results   Component Value Date    HGBA1C 5.0 07/17/2020       BMP:  Lab Results   Component Value Date     08/22/2023     12/05/2022     09/27/2022     K 4.5 08/22/2023    K 3.8 12/05/2022    K 4.1 09/27/2022    CL 99 08/22/2023    CL 97 (L) 12/05/2022     09/27/2022    CO2 31 08/22/2023    CO2 30 12/05/2022    CO2 26 09/27/2022    BUN 18 08/22/2023    BUN 13 12/05/2022    BUN 15 09/27/2022    CREATININE 0.94 08/22/2023    CREATININE 0.86 12/05/2022    CREATININE 0.68 09/27/2022       CBC:  Lab Results   Component Value Date    WBC 8.1 08/22/2023    WBC 9.8 12/05/2022    WBC 11.3 09/27/2022    RBC 4.51 08/22/2023    RBC 4.47 (L) 12/05/2022    RBC 4.43 (L) 09/27/2022    HGB 14.6 08/22/2023    HGB 14.1 12/05/2022    HGB 14.3 09/27/2022    HCT 46.0 08/22/2023    HCT 44.4 12/05/2022    HCT 44.4 09/27/2022     (H) 08/22/2023    MCV 99 12/05/2022     09/27/2022    MCHC 31.7 (L) 08/22/2023    MCHC 31.8 (L) 12/05/2022    MCHC 32.2 09/27/2022    RDW 14.4 08/22/2023    RDW 13.0 12/05/2022    RDW 14.3 09/27/2022     08/22/2023     12/05/2022     09/27/2022       Cardiac Enzymes:    Lab Results   Component Value Date    TROPHS 11 09/21/2022       Hepatic Function Panel:    Lab Results   Component Value Date    ALKPHOS 57 08/22/2023    ALT 36 08/22/2023    AST 33 08/22/2023    PROT 7.0 08/22/2023    BILITOT 0.5 08/22/2023       Problem List:     Patient Active Problem List   Diagnosis    Anxiety disorder    Cervical radiculopathy, chronic    Chronic radicular low back pain    Cubital tunnel syndrome on left    Dyslipidemia    Deformity of hand, right    Degenerative scoliosis in adult patient    Erythema    Finger pain    Hand pain    Fusion of joint    Hypoglycemia    Insomnia    Lumbar pain    Lumbar postlaminectomy syndrome    Myalgia    Neuropathy    Osteoarthritis of finger of left hand    Thumb pain    Shoulder pain    Vitamin D deficiency    Vitamin B12 deficiency    Trigger finger of left thumb    Trigger finger    Upper back pain    Cervical spondylosis without myelopathy    Degenerative disc disease, cervical    Displacement of  lumbar intervertebral disc without myelopathy    Dizzy spells    Erectile dysfunction due to arterial insufficiency    Lumbar spondylosis    Lumbosacral spondylosis without myelopathy    BMI 25.0-25.9,adult    Urinary frequency    Colon cancer screening       Asessment:     Problem List Items Addressed This Visit             ICD-10-CM    Dyslipidemia E78.5    Relevant Medications    rosuvastatin (Crestor) 10 mg tablet    Other Relevant Orders    Follow Up In Cardiology    Lipid panel    Comprehensive metabolic panel    Myalgia M79.10    Atherosclerosis of native coronary artery of native heart without angina pectoris - Primary I25.10

## 2023-11-01 DIAGNOSIS — M54.9 UPPER BACK PAIN: ICD-10-CM

## 2023-11-01 DIAGNOSIS — M54.50 LUMBAR PAIN: ICD-10-CM

## 2023-11-01 DIAGNOSIS — G62.9 NEUROPATHY: ICD-10-CM

## 2023-11-01 RX ORDER — GABAPENTIN 300 MG/1
CAPSULE ORAL
Qty: 10 CAPSULE | Refills: 0 | Status: SHIPPED | OUTPATIENT
Start: 2023-11-01 | End: 2023-11-03 | Stop reason: SDUPTHER

## 2023-11-01 RX ORDER — TRAMADOL HYDROCHLORIDE 50 MG/1
50 TABLET ORAL 2 TIMES DAILY PRN
Qty: 4 TABLET | Refills: 0 | Status: SHIPPED | OUTPATIENT
Start: 2023-11-01 | End: 2023-11-03 | Stop reason: SDUPTHER

## 2023-11-01 NOTE — TELEPHONE ENCOUNTER
Rx Controlled Refill Request Telephone Encounter    Name: Mikey Jean  :  1965    Medication Name:   gabapentin (Neurontin)   Dose (Optional):   300 MG   Quantity (Optional):   150  Directions (Optional):   1 CAPSULE IN AM, 1 CAPSULE IN PM AND 3 CAPSULES AT NIGHT     ALLERGIES:    NO KNOWN ALLERGIES     LAST DRUG SCREEN:     23  LAST MED CONTRACT:    3/24/23    Specific Pharmacy location:    [address]     Date of last appointment:    23  Date of next appointment:    2023    Best number to reach patient:    [phone]      Medication Name:   TRAMADOL   Dose (Optional):   50MG   Quantity (Optional):   60 TABLETS   Directions (Optional):   TAke 1 tablet (50 mg) by mouth 2 times a day as needed for severe pain (7 - 10).

## 2023-11-03 ENCOUNTER — OFFICE VISIT (OUTPATIENT)
Dept: PRIMARY CARE | Facility: CLINIC | Age: 58
End: 2023-11-03
Payer: COMMERCIAL

## 2023-11-03 ENCOUNTER — APPOINTMENT (OUTPATIENT)
Dept: PAIN MEDICINE | Facility: CLINIC | Age: 58
End: 2023-11-03
Payer: COMMERCIAL

## 2023-11-03 VITALS
RESPIRATION RATE: 18 BRPM | SYSTOLIC BLOOD PRESSURE: 124 MMHG | OXYGEN SATURATION: 98 % | BODY MASS INDEX: 26.72 KG/M2 | HEART RATE: 103 BPM | DIASTOLIC BLOOD PRESSURE: 68 MMHG | WEIGHT: 186.2 LBS | TEMPERATURE: 97.8 F

## 2023-11-03 DIAGNOSIS — M54.50 LUMBAR PAIN: ICD-10-CM

## 2023-11-03 DIAGNOSIS — Z12.5 SCREENING PSA (PROSTATE SPECIFIC ANTIGEN): ICD-10-CM

## 2023-11-03 DIAGNOSIS — G62.9 NEUROPATHY: ICD-10-CM

## 2023-11-03 DIAGNOSIS — M54.9 UPPER BACK PAIN: ICD-10-CM

## 2023-11-03 DIAGNOSIS — M45.9 ANKYLOSING SPONDYLITIS OF UNSPECIFIED SITES IN SPINE (MULTI): Primary | ICD-10-CM

## 2023-11-03 DIAGNOSIS — M54.16 CHRONIC RADICULAR LOW BACK PAIN: ICD-10-CM

## 2023-11-03 DIAGNOSIS — G89.29 CHRONIC RADICULAR LOW BACK PAIN: ICD-10-CM

## 2023-11-03 PROCEDURE — 1036F TOBACCO NON-USER: CPT | Performed by: FAMILY MEDICINE

## 2023-11-03 PROCEDURE — 3008F BODY MASS INDEX DOCD: CPT | Performed by: FAMILY MEDICINE

## 2023-11-03 PROCEDURE — 99214 OFFICE O/P EST MOD 30 MIN: CPT | Performed by: FAMILY MEDICINE

## 2023-11-03 RX ORDER — GABAPENTIN 300 MG/1
CAPSULE ORAL
Qty: 150 CAPSULE | Refills: 2 | Status: SHIPPED | OUTPATIENT
Start: 2023-11-03 | End: 2023-12-06 | Stop reason: SDUPTHER

## 2023-11-03 RX ORDER — OXYCODONE AND ACETAMINOPHEN 5; 325 MG/1; MG/1
1-2 TABLET ORAL EVERY 8 HOURS PRN
Qty: 75 TABLET | Refills: 0 | Status: SHIPPED | OUTPATIENT
Start: 2023-11-03 | End: 2023-12-06 | Stop reason: SDUPTHER

## 2023-11-03 RX ORDER — TRAMADOL HYDROCHLORIDE 50 MG/1
50 TABLET ORAL 2 TIMES DAILY PRN
Qty: 60 TABLET | Refills: 0 | Status: SHIPPED | OUTPATIENT
Start: 2023-11-03 | End: 2023-12-04 | Stop reason: SDUPTHER

## 2023-11-03 ASSESSMENT — ENCOUNTER SYMPTOMS
PERIANAL NUMBNESS: 0
TINGLING: 1
PARESIS: 0
HEADACHES: 0
PARESTHESIAS: 1
BOWEL INCONTINENCE: 0
DYSURIA: 0
ABDOMINAL PAIN: 0
WEAKNESS: 1
WEIGHT LOSS: 0
LEG PAIN: 1
FEVER: 0
BACK PAIN: 1
NUMBNESS: 1

## 2023-11-03 NOTE — PROGRESS NOTES
Subjective   Patient ID: Mikey Jean is a 58 y.o. male who presents for Back Pain and Hyperlipidemia.    Back Pain  This is a chronic problem. The current episode started more than 1 year ago. The problem occurs daily. The problem is unchanged. The pain is present in the lumbar spine and thoracic spine. The quality of the pain is described as aching and shooting. The pain radiates to the left foot. The pain is at a severity of 5/10. The pain is The same all the time. The symptoms are aggravated by bending, coughing, position and sitting. Stiffness is present In the morning. Associated symptoms include chest pain, leg pain, numbness, paresthesias, pelvic pain, tingling and weakness. Pertinent negatives include no abdominal pain, bladder incontinence, bowel incontinence, dysuria, fever, headaches, paresis, perianal numbness or weight loss. Risk factors include history of cancer, lack of exercise and poor posture.     Patient presents today for PERCOCET follow up.  Rates pain 8/10.over the past 7 days before taking medication  Reports that the medication gives 50% control/relief  [place] reviewed today.  Controlled substance agreement signed.  Last took medication yesterday.    Patient presents today for TRAMADOL follow up.  Rates pain 8/10.over the past 7 days before taking medication  Reports that the medication gives 50% control/relief  OAR[place] reviewed today.  Controlled substance agreement signed.  Last took medication today.    Pt reports cardiologist put pt on Crestor 10 mg  Would like to discuss  Previously took statins and has tried Livalo.  Insurance denied Repatha.    Pt was put on medication by Rheumatologist called Michael  Pt is trialing and would like to discuss  He is on his 3rd round of Taltz.  He has noticed a difference in his pain.  He is seeing Dr. Luong for pain management.    No other concerns today      Review of systems  ; Patient seen today for exam denies any problems with headaches or vision,  denies any shortness of breath chest pain nausea or vomiting, no black stool no blood in the stool no heartburn type symptoms denies any problems with constipation or diarrhea, and no dysuria-type symptoms    The patient's allergies medications were reviewed with them today    The patient's social family and surgical history or also reviewed here today, along with her past medical history.     Objective     Alert and active in  no acute distress  HEENT TMs clear oropharynx negative nares clear no drainage noted neck supple  With no adenopathy   Heart regular rate and rhythm without murmur and no carotid bruits  Lungs- clear to auscultation bilaterally, no wheeze or rhonchi noted  Thyroid -negative masses or nodularity  Abdomen- soft times four quadrants , bowel sounds positive no masses or organomegaly, negative tenderness guarding or rebound  Neurological exam unremarkable- DTRs in upper and lower extremities within normal limits.   skin -no lesions noted      /68   Pulse 103   Temp 36.6 °C (97.8 °F)   Resp 18   Wt 84.5 kg (186 lb 3.2 oz)   SpO2 98%   BMI 26.72 kg/m²     No Known Allergies    Assessment/Plan   Problem List Items Addressed This Visit       Chronic radicular low back pain    Lumbar pain    Neuropathy    Upper back pain    Ankylosing spondylitis of unspecified sites in spine (CMS/HCC) - Primary     I personally reviewed the OARRS report for this patient. I have considered the risks of abuse, dependence, addiction, and diversion.  CSA 3/24/23  UDS 6/21/23    Refill Gabapentin, Percocet, Tramadol.     Recommend taking CoQ10 with Crestor.  Last statin bothered him, caused him aches and pain.  Hopefully this one will work.    If anything worsens or changes please call us at once, follow up in the office as planned.    Scribe Attestation  By signing my name below, I, Rosa Kendall MA, Scribe   attest that this documentation has been prepared under the direction and in the presence of  Luis Miguel Jasmine DO.

## 2023-11-13 ENCOUNTER — OFFICE VISIT (OUTPATIENT)
Dept: PAIN MEDICINE | Facility: CLINIC | Age: 58
End: 2023-11-13
Payer: COMMERCIAL

## 2023-11-13 VITALS
RESPIRATION RATE: 16 BRPM | BODY MASS INDEX: 25.48 KG/M2 | SYSTOLIC BLOOD PRESSURE: 134 MMHG | HEIGHT: 70 IN | OXYGEN SATURATION: 100 % | WEIGHT: 178 LBS | TEMPERATURE: 97.9 F | HEART RATE: 91 BPM | DIASTOLIC BLOOD PRESSURE: 77 MMHG

## 2023-11-13 DIAGNOSIS — M47.816 LUMBAR SPONDYLOSIS: Primary | ICD-10-CM

## 2023-11-13 PROCEDURE — 1036F TOBACCO NON-USER: CPT | Performed by: ANESTHESIOLOGY

## 2023-11-13 PROCEDURE — 99212 OFFICE O/P EST SF 10 MIN: CPT | Performed by: ANESTHESIOLOGY

## 2023-11-13 PROCEDURE — 3008F BODY MASS INDEX DOCD: CPT | Performed by: ANESTHESIOLOGY

## 2023-11-13 SDOH — ECONOMIC STABILITY: INCOME INSECURITY: IN THE LAST 12 MONTHS, WAS THERE A TIME WHEN YOU WERE NOT ABLE TO PAY THE MORTGAGE OR RENT ON TIME?: NO

## 2023-11-13 SDOH — ECONOMIC STABILITY: HOUSING INSECURITY
IN THE LAST 12 MONTHS, WAS THERE A TIME WHEN YOU DID NOT HAVE A STEADY PLACE TO SLEEP OR SLEPT IN A SHELTER (INCLUDING NOW)?: NO

## 2023-11-13 SDOH — ECONOMIC STABILITY: FOOD INSECURITY: WITHIN THE PAST 12 MONTHS, YOU WORRIED THAT YOUR FOOD WOULD RUN OUT BEFORE YOU GOT MONEY TO BUY MORE.: NEVER TRUE

## 2023-11-13 SDOH — ECONOMIC STABILITY: FOOD INSECURITY: WITHIN THE PAST 12 MONTHS, THE FOOD YOU BOUGHT JUST DIDN'T LAST AND YOU DIDN'T HAVE MONEY TO GET MORE.: NEVER TRUE

## 2023-11-13 ASSESSMENT — ENCOUNTER SYMPTOMS
SHORTNESS OF BREATH: 0
LOSS OF SENSATION IN FEET: 1
OCCASIONAL FEELINGS OF UNSTEADINESS: 0
BACK PAIN: 1
DEPRESSION: 0
FEVER: 0

## 2023-11-13 ASSESSMENT — LIFESTYLE VARIABLES: TOTAL SCORE: 3

## 2023-11-13 ASSESSMENT — COLUMBIA-SUICIDE SEVERITY RATING SCALE - C-SSRS
6. HAVE YOU EVER DONE ANYTHING, STARTED TO DO ANYTHING, OR PREPARED TO DO ANYTHING TO END YOUR LIFE?: NO
2. HAVE YOU ACTUALLY HAD ANY THOUGHTS OF KILLING YOURSELF?: NO
1. IN THE PAST MONTH, HAVE YOU WISHED YOU WERE DEAD OR WISHED YOU COULD GO TO SLEEP AND NOT WAKE UP?: NO

## 2023-11-13 ASSESSMENT — PATIENT HEALTH QUESTIONNAIRE - PHQ9
SUM OF ALL RESPONSES TO PHQ9 QUESTIONS 1 AND 2: 0
2. FEELING DOWN, DEPRESSED OR HOPELESS: NOT AT ALL
1. LITTLE INTEREST OR PLEASURE IN DOING THINGS: NOT AT ALL

## 2023-11-13 ASSESSMENT — PAIN SCALES - GENERAL: PAINLEVEL: 5

## 2023-11-13 NOTE — PROGRESS NOTES
Chief Complain  Follow-up (Had injections a few week ago, 75% for a few hours not working anymore. 5 pian number now)       History Of Present Illness  Mikey Jean is a 58 y.o. male here for low back pain. The patient rates the pain at 5  on a scale from 0-10.  The patient describes pain as dull, aching.  The pain is worsened by prolonged sitting and lifting and is alleviated by position change.  Since the last visit the pain has stayed the same.  The patient denies any fever, chills, weight loss, bladder/bowel incontinence.     Previous Procedures:  Bilateral L3, 4, 5 lumbar medial branch block on 9/22/2023: More than 80% relief for 2 to 3 hours    Past Medical History  He has a past medical history of Ankylosing spondylitis of site in spine (CMS/HCC), Chronic pain disorder, Contact with and (suspected) exposure to covid-19 (01/13/2021), Encounter for general adult medical examination without abnormal findings (05/04/2022), Joint pain, Low back pain, Lumbosacral disc disease, Neck pain, Osteoarthritis, Peripheral neuropathy, and Spinal stenosis.    Surgical History  He has a past surgical history that includes CT angio coronary art with heartflow if score >30% (09/15/2023); Back surgery; Laminectomy; Neck surgery; Spine surgery; Trigger point injection; Spinal fusion; and Epidural block injection.     Social History  He reports that he has never smoked. He has never used smokeless tobacco. He reports current alcohol use. He reports that he does not use drugs.    Family History  Family History   Problem Relation Name Age of Onset    Cancer Mother      Other ( maker) Brother          Allergies  Patient has no known allergies.    Review of Systems  Review of Systems   Constitutional:  Negative for fever.   Respiratory:  Negative for shortness of breath.    Cardiovascular:  Negative for chest pain.   Musculoskeletal:  Positive for back pain.   Psychiatric/Behavioral:  Negative for suicidal ideas.         Physical  "Exam  Physical Exam  HENT:      Head: Normocephalic and atraumatic.   Eyes:      Extraocular Movements: Extraocular movements intact.      Pupils: Pupils are equal, round, and reactive to light.   Pulmonary:      Effort: Pulmonary effort is normal.   Musculoskeletal:      Cervical back: Neck supple.   Neurological:      Mental Status: He is alert.   Psychiatric:         Mood and Affect: Mood normal.           Last Recorded Vitals  Blood pressure 134/77, pulse 91, temperature 36.6 °C (97.9 °F), temperature source Temporal, resp. rate 16, height 1.778 m (5' 10\"), weight 80.7 kg (178 lb), SpO2 100 %.       Assessment/Plan     Mikey Jean is a 58 y.o. male here for follow-up of low back pain.  Status post bilateral L3, 4, 5 lumbar medial branch block done on 9/22/2023 which provided him with 80% relief lasting for couple of hours.  He had similar results with lumbar medial branch block done on June of this year.  Given the significant short-term benefit from the lumbar medial branch block I would recommend doing radiofrequency ablation under IV sedation and fluoroscopy.         Jayce Luong MD  "

## 2023-12-01 ENCOUNTER — HOSPITAL ENCOUNTER (OUTPATIENT)
Dept: PAIN MEDICINE | Facility: CLINIC | Age: 58
Discharge: HOME | End: 2023-12-01
Payer: COMMERCIAL

## 2023-12-01 ENCOUNTER — ANCILLARY PROCEDURE (OUTPATIENT)
Dept: RADIOLOGY | Facility: CLINIC | Age: 58
End: 2023-12-01
Payer: COMMERCIAL

## 2023-12-01 VITALS
TEMPERATURE: 96.3 F | RESPIRATION RATE: 16 BRPM | HEART RATE: 86 BPM | SYSTOLIC BLOOD PRESSURE: 113 MMHG | DIASTOLIC BLOOD PRESSURE: 71 MMHG | OXYGEN SATURATION: 98 %

## 2023-12-01 DIAGNOSIS — M47.816 LUMBAR SPONDYLOSIS: ICD-10-CM

## 2023-12-01 PROCEDURE — 2500000005 HC RX 250 GENERAL PHARMACY W/O HCPCS

## 2023-12-01 PROCEDURE — 64635 DESTROY LUMB/SAC FACET JNT: CPT | Performed by: ANESTHESIOLOGY

## 2023-12-01 PROCEDURE — 64635 DESTROY LUMB/SAC FACET JNT: CPT | Mod: 50 | Performed by: ANESTHESIOLOGY

## 2023-12-01 PROCEDURE — 77003 FLUOROGUIDE FOR SPINE INJECT: CPT

## 2023-12-01 PROCEDURE — 64636 DESTROY L/S FACET JNT ADDL: CPT | Performed by: ANESTHESIOLOGY

## 2023-12-01 PROCEDURE — 99152 MOD SED SAME PHYS/QHP 5/>YRS: CPT

## 2023-12-01 PROCEDURE — 99153 MOD SED SAME PHYS/QHP EA: CPT

## 2023-12-01 PROCEDURE — 64636 DESTROY L/S FACET JNT ADDL: CPT | Mod: 50 | Performed by: ANESTHESIOLOGY

## 2023-12-01 PROCEDURE — 2500000004 HC RX 250 GENERAL PHARMACY W/ HCPCS (ALT 636 FOR OP/ED)

## 2023-12-01 PROCEDURE — 2500000004 HC RX 250 GENERAL PHARMACY W/ HCPCS (ALT 636 FOR OP/ED): Performed by: ANESTHESIOLOGY

## 2023-12-01 RX ORDER — LIDOCAINE HYDROCHLORIDE 10 MG/ML
INJECTION, SOLUTION EPIDURAL; INFILTRATION; INTRACAUDAL; PERINEURAL
Status: COMPLETED
Start: 2023-12-01 | End: 2023-12-01

## 2023-12-01 RX ORDER — MIDAZOLAM HYDROCHLORIDE 1 MG/ML
INJECTION, SOLUTION INTRAMUSCULAR; INTRAVENOUS
Status: COMPLETED
Start: 2023-12-01 | End: 2023-12-01

## 2023-12-01 RX ORDER — FENTANYL CITRATE 50 UG/ML
INJECTION, SOLUTION INTRAMUSCULAR; INTRAVENOUS
Status: DISPENSED
Start: 2023-12-01 | End: 2023-12-01

## 2023-12-01 RX ORDER — MIDAZOLAM HYDROCHLORIDE 1 MG/ML
2 INJECTION INTRAMUSCULAR; INTRAVENOUS ONCE
Status: DISCONTINUED | OUTPATIENT
Start: 2023-12-01 | End: 2023-12-02 | Stop reason: HOSPADM

## 2023-12-01 RX ORDER — FENTANYL CITRATE 50 UG/ML
50 INJECTION, SOLUTION INTRAMUSCULAR; INTRAVENOUS ONCE
Status: COMPLETED | OUTPATIENT
Start: 2023-12-01 | End: 2023-12-01

## 2023-12-01 RX ORDER — LIDOCAINE HYDROCHLORIDE 20 MG/ML
INJECTION, SOLUTION EPIDURAL; INFILTRATION; INTRACAUDAL; PERINEURAL
Status: COMPLETED
Start: 2023-12-01 | End: 2023-12-01

## 2023-12-01 RX ADMIN — LIDOCAINE HYDROCHLORIDE 100 MG: 20 INJECTION, SOLUTION EPIDURAL; INFILTRATION; INTRACAUDAL; PERINEURAL at 09:34

## 2023-12-01 RX ADMIN — FENTANYL CITRATE 50 MCG: 50 INJECTION INTRAMUSCULAR; INTRAVENOUS at 09:25

## 2023-12-01 RX ADMIN — MIDAZOLAM 2 MG: 1 INJECTION INTRAMUSCULAR; INTRAVENOUS at 09:25

## 2023-12-01 RX ADMIN — LIDOCAINE HYDROCHLORIDE 50 MG: 10 INJECTION, SOLUTION EPIDURAL; INFILTRATION; INTRACAUDAL; PERINEURAL at 09:34

## 2023-12-01 ASSESSMENT — PAIN SCALES - GENERAL
PAINLEVEL_OUTOF10: 5 - MODERATE PAIN
PAINLEVEL_OUTOF10: 0 - NO PAIN

## 2023-12-01 ASSESSMENT — COLUMBIA-SUICIDE SEVERITY RATING SCALE - C-SSRS
1. IN THE PAST MONTH, HAVE YOU WISHED YOU WERE DEAD OR WISHED YOU COULD GO TO SLEEP AND NOT WAKE UP?: NO
1. IN THE PAST MONTH, HAVE YOU WISHED YOU WERE DEAD OR WISHED YOU COULD GO TO SLEEP AND NOT WAKE UP?: NO
6. HAVE YOU EVER DONE ANYTHING, STARTED TO DO ANYTHING, OR PREPARED TO DO ANYTHING TO END YOUR LIFE?: NO
2. HAVE YOU ACTUALLY HAD ANY THOUGHTS OF KILLING YOURSELF?: NO
2. HAVE YOU ACTUALLY HAD ANY THOUGHTS OF KILLING YOURSELF?: NO
6. HAVE YOU EVER DONE ANYTHING, STARTED TO DO ANYTHING, OR PREPARED TO DO ANYTHING TO END YOUR LIFE?: NO

## 2023-12-01 ASSESSMENT — PAIN - FUNCTIONAL ASSESSMENT
PAIN_FUNCTIONAL_ASSESSMENT: 0-10

## 2023-12-01 ASSESSMENT — ENCOUNTER SYMPTOMS
LOSS OF SENSATION IN FEET: 0
OCCASIONAL FEELINGS OF UNSTEADINESS: 0
DEPRESSION: 0

## 2023-12-01 ASSESSMENT — PATIENT HEALTH QUESTIONNAIRE - PHQ9
2. FEELING DOWN, DEPRESSED OR HOPELESS: NOT AT ALL
1. LITTLE INTEREST OR PLEASURE IN DOING THINGS: NOT AT ALL
SUM OF ALL RESPONSES TO PHQ9 QUESTIONS 1 AND 2: 0

## 2023-12-01 NOTE — OP NOTE
Procedure Note     Date: 2023  OR Location: [facility] NON-OR PROCEDURES    Name: Mikey Jean, : 1965, Age: 58 y.o., MRN: [id number], Sex: male    Diagnosis  Preprocedure diagnosis: Lumbar spondylosis  Postprocedure diagnosis: Same    Procedures    bilateral L3,L4,L5 lumbar medial branch radiofrequency ablation    The patient was seen in the preoperative area. The risks, benefits, complications, treatment options, non-operative alternatives, expected recovery and outcomes were discussed with the patient. The patient concurred with the proposed plan, giving informed consent.      Procedure Details:     The patient was taken to the procedure room placed in prone position. Patient was connected to the monitors including EKG, blood pressure, pulse oximetry. Timeout was completed. Area over the lumbar spine was prepped and draped using standard sterile precautions. Skin was anesthetized using 1% lidocaine with 25-gauge needle, total 10 mL of 1% lidocaine solution was used. Under fluoroscopy an 18-gauge 10 mm active tip venom needle was advanced to the bilateral ala of the sacrum to target bilateral DR5 nerve. Than needles were advanced at the junction of superior articular process and transverse process to target bilateral L3, L4 medial branches. The positioning of the needles were confirmed with AP, lateral and oblique views under fluoroscopy. Motor stimulation at 1.5mA there did not elicit motor response of the respective nerve roots. The targets were anesthetized using 1.5 ml of 2% lidocaine were injected at each level. Radiofrequency ablation lesion was made at 80°C for 90 seconds. The needles were then repositioned, a second lesion was created at 80°C for 90 seconds. The patient tolerated the procedure without any complications.      Complications:  None; patient tolerated the procedure well.    Disposition: Home  Condition: stable         Additional Details: NA    Attending Attestation: I performed the  procedure.    Jayce Luong MD

## 2023-12-04 DIAGNOSIS — G62.9 NEUROPATHY: ICD-10-CM

## 2023-12-04 DIAGNOSIS — M54.9 UPPER BACK PAIN: ICD-10-CM

## 2023-12-04 DIAGNOSIS — M54.50 LUMBAR PAIN: ICD-10-CM

## 2023-12-04 NOTE — TELEPHONE ENCOUNTER
"----- Message from Mikey Jean sent at 12/1/2023  4:38 PM EST -----  Regarding: Rrefills  Contact: [phone]  Good afternoon. I do not seem to be able to figure out how to do this on the \"Medication\" page  Can I please get refills of my Tramadol and Gabapentin?  Thank you,  Mikey Jean  "

## 2023-12-05 ENCOUNTER — OFFICE VISIT (OUTPATIENT)
Dept: NEUROSURGERY | Facility: CLINIC | Age: 58
End: 2023-12-05
Payer: COMMERCIAL

## 2023-12-05 VITALS
SYSTOLIC BLOOD PRESSURE: 110 MMHG | BODY MASS INDEX: 26.57 KG/M2 | DIASTOLIC BLOOD PRESSURE: 60 MMHG | HEART RATE: 96 BPM | HEIGHT: 70 IN | RESPIRATION RATE: 14 BRPM | WEIGHT: 185.6 LBS

## 2023-12-05 DIAGNOSIS — M54.50 LUMBAR PAIN: ICD-10-CM

## 2023-12-05 DIAGNOSIS — M54.9 UPPER BACK PAIN: ICD-10-CM

## 2023-12-05 DIAGNOSIS — M54.12 CERVICAL RADICULOPATHY, CHRONIC: ICD-10-CM

## 2023-12-05 DIAGNOSIS — Z98.1 STATUS POST CERVICAL SPINAL FUSION: ICD-10-CM

## 2023-12-05 DIAGNOSIS — Q76.49 BERTOLOTTI'S SYNDROME: Primary | ICD-10-CM

## 2023-12-05 PROCEDURE — 99213 OFFICE O/P EST LOW 20 MIN: CPT | Performed by: PHYSICIAN ASSISTANT

## 2023-12-05 PROCEDURE — 1036F TOBACCO NON-USER: CPT | Performed by: PHYSICIAN ASSISTANT

## 2023-12-05 PROCEDURE — 3008F BODY MASS INDEX DOCD: CPT | Performed by: PHYSICIAN ASSISTANT

## 2023-12-05 ASSESSMENT — PATIENT HEALTH QUESTIONNAIRE - PHQ9
1. LITTLE INTEREST OR PLEASURE IN DOING THINGS: NOT AT ALL
SUM OF ALL RESPONSES TO PHQ9 QUESTIONS 1 & 2: 0
2. FEELING DOWN, DEPRESSED OR HOPELESS: NOT AT ALL

## 2023-12-05 NOTE — PROGRESS NOTES
[facility] [place]  Department of Neurological Surgery  Established Patient Visit    History of Present Illness  Mikey Jean is a 58 y.o. year old male who presents to the spine clinic in follow up with numbness and tingling in left cervical region extending from posterior neck continuing laterally and inferiorly into his shoulder and shoulder blade as well as through his deltoid and lateral bicep/tricep.  This has been ongoing over the past 1 and half months and has intermittent occurrence of approximately 2 to 10 minutes in duration.  He has not feel specific pain though will have a burning or stabbing sensation on occasion.  No position changes or medications will specifically improve his symptoms.  Currently rates them as a discomfort at 5/10.  Continues to utilize gabapentin, Robaxin, Mobic for overall relief of nerve and anti-inflammatory pain.  Has been given a trial of psoriatic arthritic medication and endorses improvement of other generalized aches and pains though the symptom has continued.  He has a history of anterior cervical discectomy and fusion most recently by Dr. Daley in March 2022.  He denies right-sided neck, shoulder, and arm symptoms.  Denies balance disturbance as well as bowel and bladder concerns.    Patient's BMI is Body mass index is 26.63 kg/m².    14/14 systems reviewed and negative other than what is listed in the history of present illness    Patient Active Problem List   Diagnosis    Anxiety disorder    Cervical radiculopathy, chronic    Chronic radicular low back pain    Cubital tunnel syndrome on left    Dyslipidemia    Deformity of hand, right    Degenerative scoliosis in adult patient    Erythema    Finger pain    Hand pain    Fusion of joint    Hypoglycemia    Insomnia    Lumbar pain    Lumbar postlaminectomy syndrome    Myalgia    Neuropathy    Osteoarthritis of finger of left hand    Thumb pain    Shoulder pain    Vitamin D deficiency    Vitamin B12  deficiency    Trigger finger of left thumb    Trigger finger    Upper back pain    Cervical spondylosis without myelopathy    Degenerative disc disease, cervical    Displacement of lumbar intervertebral disc without myelopathy    Dizzy spells    Erectile dysfunction due to arterial insufficiency    Lumbar spondylosis    Lumbosacral spondylosis without myelopathy    BMI 25.0-25.9,adult    Urinary frequency    Colon cancer screening    Atherosclerosis of native coronary artery of native heart without angina pectoris    Statin intolerance    Agatston CAC score, >400    Ankylosing spondylitis of unspecified sites in spine (CMS/[facility])     Past Medical History:   Diagnosis Date    Ankylosing spondylitis of site in spine (CMS/[facility])     Chronic pain disorder     Contact with and (suspected) exposure to covid-19 01/13/2021    Suspected COVID-19 virus infection    Encounter for general adult medical examination without abnormal findings 05/04/2022    Annual physical exam    Joint pain     Low back pain     Lumbosacral disc disease     Neck pain     Osteoarthritis     Peripheral neuropathy     Spinal stenosis      Past Surgical History:   Procedure Laterality Date    BACK SURGERY      CT ANGIO CORONARY ART WITH HEARTFLOW IF SCORE >30%  09/15/2023    CT HEART CORONARY ANGIOGRAM 9/15/2023 [id number]    EPIDURAL BLOCK INJECTION      LAMINECTOMY      NECK SURGERY      SPINAL FUSION      SPINE SURGERY      TRIGGER POINT INJECTION       Social History     Tobacco Use    Smoking status: Never    Smokeless tobacco: Never   Substance Use Topics    Alcohol use: Yes     Comment: 10 drinks per week     family history includes Cancer in his mother;  maker in his brother.    Current Outpatient Medications:     cholecalciferol (Vitamin D-3) 25 MCG (1000 UT) capsule, Take 1 capsule (25 mcg) by mouth once daily. Take as directed, Disp: , Rfl:     cyclobenzaprine (Flexeril) 10 mg tablet, TAKE 1/2 TO 1 TABLET BY MOUTH NIGHTLY AS NEEDED,  Disp: 30 tablet, Rfl: 2    DULoxetine (Cymbalta) 60 mg DR capsule, TAKE 1 CAPSULE (60 MG) BY MOUTH ONCE DAILY. DO NOT CRUSH OR CHEW., Disp: 30 capsule, Rfl: 5    gabapentin (Neurontin) 300 mg capsule, 1 CAPSULE IN AM, 1 CAPSULE IN PM AND 3 CAPSULES AT NIGHT, Disp: 150 capsule, Rfl: 2    magnesium oxide (Mag-Ox) 400 mg tablet, Take 1 tablet (400 mg) by mouth once daily., Disp: , Rfl:     meloxicam (Mobic) 15 mg tablet, TAKE 1 TABLET BY MOUTH EVERY DAY, Disp: 30 tablet, Rfl: 5    methocarbamol (Robaxin) 750 mg tablet, Take 1 tablet (750 mg) by mouth 3 times a day as needed for muscle spasms., Disp: , Rfl:     multivitamin (Multiple Vitamins) tablet, Take 1 tablet by mouth once daily., Disp: , Rfl:     oxyCODONE-acetaminophen (Percocet) 5-325 mg tablet, Take 1-2 tablets by mouth every 8 hours if needed for severe pain (7 - 10)., Disp: 75 tablet, Rfl: 0    rosuvastatin (Crestor) 10 mg tablet, Take 1 tablet (10 mg) by mouth once daily., Disp: 90 tablet, Rfl: 3  No Known Allergies    Physical Examination:    General: Well developed, awake/alert/oriented x3, no distress, alert and cooperative  Skin: Warm and dry, no lesions, no rashes  ENMT: Mucous membranes moist, no apparent injury, no lesions seen  Head/Neck: Neck Supple, no apparent injury  Respiratory/Thorax: Normal breath sounds with good chest expansion, thorax symmetric  Cardiovascular: No pitting edema, no JVD    Motor Strength: 4/5 left triceps, wrist, , otherwise 5/5 Throughout all extremities    Muscle Bulk: Normal and symmetric in all extremities    Posture:   -- Cervical: Normal  -- Thoracic: Normal  -- Lumbar : Normal  Paraspinal muscle spasm/tenderness present  Midline tenderness absent    Sensation: Moderate deficit to sensation posterior cervical region left-sided only continuing from nuchal ridge to spinous scapula as well as laterally through deltoid, otherwise intact to light touch         Assessment and Plan:    Mikey Jean is a 58 y.o. year old  male who presents to the spine clinic in follow up with numbness and tingling in left cervical region extending from posterior neck continuing laterally and inferiorly into his shoulder and shoulder blade as well as through his deltoid and lateral bicep/tricep.  This has been ongoing over the past 1 and half months and has intermittent occurrence of approximately 2 to 10 minutes in duration.  He has not feel specific pain though will have a burning or stabbing sensation on occasion.  No position changes or medications will specifically improve his symptoms.  Currently rates them as a discomfort at 5/10.  Continues to utilize gabapentin, Robaxin, Mobic for overall relief of nerve and anti-inflammatory pain.  Has been given a trial of psoriatic arthritic medication and endorses improvement of other generalized aches and pains though the symptom has continued.  He has a history of anterior cervical discectomy and fusion most recently by Dr. Daley in March 2022.  He denies right-sided neck, shoulder, and arm symptoms.  Denies balance disturbance as well as bowel and bladder concerns.    Unilateral numbness and weakness identified in postoperative scenario concerning for nerve impingement both above and below his current fusion construct.  This is continuing through opioid and prescription anti-inflammatory medications.  He had progressed through postoperative physical therapy and endorses interim relief until symptoms were he presented 1/2 months prior.  Updated MRI recommended for evaluation of soft tissue/neural involvement.  We will also obtain flexion-extension x-rays to evaluate for overall alignment and dynamic stability.          The above clinical summary has been dictated with voice recognition software. It has not been proofread for grammatical errors, typographical mistakes, or other semantic inconsistencies.    Thank you for visiting our office today. It was our pleasure to take part in your healthcare.      Do not hesitate to call with any questions regarding your plan of care after leaving at [phone] M-F 8am-4pm.     To clinicians, thank you very much for this kind referral. It is a privilege to partner with you in the care of your patients. My office would be delighted to assist you with any further consultations or with questions regarding the plan of care outlined. Do not hesitate to call the office or contact me directly.       Sincerely,      VISHNU Durham, PA-C  Associate Physician Assistant, Neurosurgery  Clinical   [facility] [place] [address]    Phone: [phone]  Fax: [phone]

## 2023-12-06 DIAGNOSIS — G62.9 NEUROPATHY: ICD-10-CM

## 2023-12-06 DIAGNOSIS — G89.29 CHRONIC RADICULAR LOW BACK PAIN: ICD-10-CM

## 2023-12-06 DIAGNOSIS — M54.16 CHRONIC RADICULAR LOW BACK PAIN: ICD-10-CM

## 2023-12-06 RX ORDER — GABAPENTIN 300 MG/1
CAPSULE ORAL
Qty: 150 CAPSULE | Refills: 2 | Status: SHIPPED | OUTPATIENT
Start: 2023-12-06 | End: 2024-05-02 | Stop reason: SDUPTHER

## 2023-12-06 RX ORDER — TRAMADOL HYDROCHLORIDE 50 MG/1
50 TABLET ORAL 2 TIMES DAILY PRN
Qty: 60 TABLET | Refills: 0 | OUTPATIENT
Start: 2023-12-06 | End: 2024-01-05

## 2023-12-06 RX ORDER — OXYCODONE AND ACETAMINOPHEN 5; 325 MG/1; MG/1
1-2 TABLET ORAL EVERY 8 HOURS PRN
Qty: 75 TABLET | Refills: 0 | Status: SHIPPED | OUTPATIENT
Start: 2023-12-06 | End: 2024-01-04 | Stop reason: SDUPTHER

## 2023-12-06 RX ORDER — TRAMADOL HYDROCHLORIDE 50 MG/1
50 TABLET ORAL 2 TIMES DAILY PRN
Qty: 60 TABLET | Refills: 0 | Status: SHIPPED | OUTPATIENT
Start: 2023-12-06 | End: 2024-01-04 | Stop reason: SDUPTHER

## 2023-12-06 NOTE — TELEPHONE ENCOUNTER
----- Message from Mikey Jean sent at 12/6/2023  4:27 PM EST -----  Regarding: Rrefills  Contact: [phone]  Hello again.  Unfortunately, these are never synced up well.  I also need my Percocet please.  The Tramadol was filled today.    Thank you  Mikey

## 2023-12-18 ENCOUNTER — APPOINTMENT (OUTPATIENT)
Dept: NEUROSURGERY | Facility: CLINIC | Age: 58
End: 2023-12-18
Payer: COMMERCIAL

## 2023-12-19 NOTE — PROGRESS NOTES
"Subjective   Patient ID: Mikey Jean is a 58 y.o. male who presents for chest congestion.  HPI  Patient presents today complaining of chest congestion x 1 1/2 weeks. Pt states yellow thick mucus. Has hoarseness. Denies fever or chills. Has not taken an at-home COVID test. No known exposure to COVID. Is vaccinated. Has not tried anything OTC.  Wife is not ill. He has not flown recently.   Has had RSV vaccine.     States he feels \"great\" on Taltz. States he's going in the right direction. Wants to eventually get off of Meloxicam.    Flu vaccine received 10/15/23    Has no other new problem /question.    Review of systems  ; Patient seen today for exam denies any problems with headaches or vision, denies any shortness of breath chest pain nausea or vomiting, no black stool no blood in the stool no heartburn type symptoms denies any problems with constipation or diarrhea, and no dysuria-type symptoms    The patient's allergies medications were reviewed with them today    The patient's social family and surgical history or also reviewed here today, along with her past medical history.     Objective     Physical examination;  vital signs are as noted, alert and oriented in no acute distress  HEENT exam TMs are showing fluid bilaterally, pharynx reveals postnasal drainage noted, positive pain over maxillary and frontal sinuses, positive shotty adenopathy noted bilateral neck, neck exam was supple without masses heart regular rate and rhythm without gallops lungs clear to auscultation with no wheezing or rhonchi bilaterally , extremities no edema skin without any changes      /72 (BP Location: Left arm, Patient Position: Sitting, BP Cuff Size: Adult)   Pulse 64   Temp 36.3 °C (97.3 °F) (Temporal)   Resp 16   Ht 1.778 m (5' 10\")   Wt 83.2 kg (183 lb 6.4 oz)   SpO2 98%   BMI 26.32 kg/m²     No Known Allergies    Assessment/Plan   Problem List Items Addressed This Visit       Anxiety disorder    Dyslipidemia    " Ankylosing spondylitis of unspecified sites in spine (CMS/[facility])     Other Visit Diagnoses       Chest congestion    -  Primary    Relevant Medications    cefdinir (Omnicef) 300 mg capsule    Hoarseness        Relevant Medications    cefdinir (Omnicef) 300 mg capsule    Facial numbness        Cervical stenosis of spine                  Long talk. Treatment options reviewed.  Cold symptoms discussed.  Start Omnicef 300 MG 2 times a day for 10 days.   Recommend you take a COVID test at home.  Since Michael is kicking in, you can take the Meloxicam every other day.  Continue to see Dr. Tabor- Cardiologist as planned.  Baby aspirin daily, recommended.    Continue and take your medications as prescribed.    Health Maintenance issues discussed.  Influenza vaccine, and Cologuard are up-to-date.    Importance of healthy diet and regular exercise regimen discussed.    Reminded to do blood work that was ordered back in October & November.  We will contact you with any test results ordered. If you do not hear from us, please contact.    If anything worsens or changes please call us at once. Follow up in the office as planned.      Scribe Attestation  By signing my name below, I, Agueda DHALIWAL, Gio   attest that this documentation has been prepared under the direction and in the presence of Luis Miguel Jasmine DO.

## 2023-12-20 ENCOUNTER — ANCILLARY PROCEDURE (OUTPATIENT)
Dept: RADIOLOGY | Facility: CLINIC | Age: 58
End: 2023-12-20
Payer: COMMERCIAL

## 2023-12-20 ENCOUNTER — APPOINTMENT (OUTPATIENT)
Dept: PHYSICAL THERAPY | Facility: CLINIC | Age: 58
End: 2023-12-20
Payer: COMMERCIAL

## 2023-12-20 DIAGNOSIS — Z98.1 STATUS POST CERVICAL SPINAL FUSION: ICD-10-CM

## 2023-12-20 DIAGNOSIS — M54.12 CERVICAL RADICULOPATHY, CHRONIC: ICD-10-CM

## 2023-12-20 PROCEDURE — 72141 MRI NECK SPINE W/O DYE: CPT

## 2023-12-20 PROCEDURE — 72141 MRI NECK SPINE W/O DYE: CPT | Performed by: RADIOLOGY

## 2023-12-21 ENCOUNTER — OFFICE VISIT (OUTPATIENT)
Dept: PRIMARY CARE | Facility: CLINIC | Age: 58
End: 2023-12-21
Payer: COMMERCIAL

## 2023-12-21 ENCOUNTER — TELEPHONE (OUTPATIENT)
Dept: NEUROSURGERY | Facility: HOSPITAL | Age: 58
End: 2023-12-21

## 2023-12-21 VITALS
TEMPERATURE: 97.3 F | OXYGEN SATURATION: 98 % | HEIGHT: 70 IN | BODY MASS INDEX: 26.26 KG/M2 | DIASTOLIC BLOOD PRESSURE: 72 MMHG | RESPIRATION RATE: 16 BRPM | WEIGHT: 183.4 LBS | SYSTOLIC BLOOD PRESSURE: 106 MMHG | HEART RATE: 64 BPM

## 2023-12-21 DIAGNOSIS — R09.89 CHEST CONGESTION: Primary | ICD-10-CM

## 2023-12-21 DIAGNOSIS — R20.0 FACIAL NUMBNESS: ICD-10-CM

## 2023-12-21 DIAGNOSIS — F41.9 ANXIETY DISORDER, UNSPECIFIED TYPE: ICD-10-CM

## 2023-12-21 DIAGNOSIS — M48.02 CERVICAL STENOSIS OF SPINE: ICD-10-CM

## 2023-12-21 DIAGNOSIS — M45.9 ANKYLOSING SPONDYLITIS OF UNSPECIFIED SITES IN SPINE (MULTI): ICD-10-CM

## 2023-12-21 DIAGNOSIS — R49.0 HOARSENESS: ICD-10-CM

## 2023-12-21 DIAGNOSIS — E78.5 DYSLIPIDEMIA: ICD-10-CM

## 2023-12-21 PROCEDURE — 3008F BODY MASS INDEX DOCD: CPT | Performed by: FAMILY MEDICINE

## 2023-12-21 PROCEDURE — 1036F TOBACCO NON-USER: CPT | Performed by: FAMILY MEDICINE

## 2023-12-21 PROCEDURE — 99213 OFFICE O/P EST LOW 20 MIN: CPT | Performed by: FAMILY MEDICINE

## 2023-12-21 RX ORDER — CEFDINIR 300 MG/1
300 CAPSULE ORAL 2 TIMES DAILY
Qty: 20 CAPSULE | Refills: 0 | Status: SHIPPED | OUTPATIENT
Start: 2023-12-21 | End: 2023-12-31

## 2023-12-21 RX ORDER — IXEKIZUMAB 80 MG/ML
80 INJECTION, SOLUTION SUBCUTANEOUS
COMMUNITY
End: 2024-03-06 | Stop reason: ALTCHOICE

## 2023-12-22 ENCOUNTER — TELEPHONE (OUTPATIENT)
Dept: CARDIOLOGY | Facility: CLINIC | Age: 58
End: 2023-12-22

## 2023-12-22 ENCOUNTER — LAB (OUTPATIENT)
Dept: LAB | Facility: LAB | Age: 58
End: 2023-12-22
Payer: COMMERCIAL

## 2023-12-22 DIAGNOSIS — E78.5 DYSLIPIDEMIA: ICD-10-CM

## 2023-12-22 DIAGNOSIS — Z79.899 OTHER LONG TERM (CURRENT) DRUG THERAPY: Primary | ICD-10-CM

## 2023-12-22 DIAGNOSIS — Z12.5 SCREENING PSA (PROSTATE SPECIFIC ANTIGEN): ICD-10-CM

## 2023-12-22 LAB
ALBUMIN SERPL BCP-MCNC: 4.3 G/DL (ref 3.4–5)
ALP SERPL-CCNC: 60 U/L (ref 33–136)
ALT SERPL W P-5'-P-CCNC: 27 U/L (ref 10–52)
ANION GAP SERPL CALC-SCNC: 13 MMOL/L (ref 10–20)
AST SERPL W P-5'-P-CCNC: 33 U/L (ref 9–39)
BASOPHILS # BLD AUTO: 0.08 X10*3/UL (ref 0–0.1)
BASOPHILS NFR BLD AUTO: 1 %
BILIRUB SERPL-MCNC: 0.6 MG/DL (ref 0–1.2)
BUN SERPL-MCNC: 19 MG/DL (ref 6–23)
CALCIUM SERPL-MCNC: 9 MG/DL (ref 8.6–10.3)
CHLORIDE SERPL-SCNC: 100 MMOL/L (ref 98–107)
CHOLEST SERPL-MCNC: 167 MG/DL (ref 0–199)
CHOLESTEROL/HDL RATIO: 2.3
CO2 SERPL-SCNC: 30 MMOL/L (ref 21–32)
CREAT SERPL-MCNC: 0.87 MG/DL (ref 0.5–1.3)
CRP SERPL-MCNC: 0.14 MG/DL
EOSINOPHIL # BLD AUTO: 0.38 X10*3/UL (ref 0–0.7)
EOSINOPHIL NFR BLD AUTO: 5 %
ERYTHROCYTE [DISTWIDTH] IN BLOOD BY AUTOMATED COUNT: 12.9 % (ref 11.5–14.5)
ERYTHROCYTE [SEDIMENTATION RATE] IN BLOOD BY WESTERGREN METHOD: 5 MM/H (ref 0–20)
GFR SERPL CREATININE-BSD FRML MDRD: >90 ML/MIN/1.73M*2
GLUCOSE SERPL-MCNC: 96 MG/DL (ref 74–99)
HCT VFR BLD AUTO: 43.6 % (ref 41–52)
HDLC SERPL-MCNC: 71.2 MG/DL
HGB BLD-MCNC: 14.1 G/DL (ref 13.5–17.5)
IMM GRANULOCYTES # BLD AUTO: 0.01 X10*3/UL (ref 0–0.7)
IMM GRANULOCYTES NFR BLD AUTO: 0.1 % (ref 0–0.9)
LDLC SERPL CALC-MCNC: 59 MG/DL
LYMPHOCYTES # BLD AUTO: 2.63 X10*3/UL (ref 1.2–4.8)
LYMPHOCYTES NFR BLD AUTO: 34.4 %
MCH RBC QN AUTO: 32.4 PG (ref 26–34)
MCHC RBC AUTO-ENTMCNC: 32.3 G/DL (ref 32–36)
MCV RBC AUTO: 100 FL (ref 80–100)
MONOCYTES # BLD AUTO: 0.57 X10*3/UL (ref 0.1–1)
MONOCYTES NFR BLD AUTO: 7.5 %
NEUTROPHILS # BLD AUTO: 3.97 X10*3/UL (ref 1.2–7.7)
NEUTROPHILS NFR BLD AUTO: 52 %
NON HDL CHOLESTEROL: 96 MG/DL (ref 0–149)
NRBC BLD-RTO: 0 /100 WBCS (ref 0–0)
PLATELET # BLD AUTO: 242 X10*3/UL (ref 150–450)
POTASSIUM SERPL-SCNC: 4.7 MMOL/L (ref 3.5–5.3)
PROT SERPL-MCNC: 6.3 G/DL (ref 6.4–8.2)
PSA SERPL-MCNC: 0.18 NG/ML
RBC # BLD AUTO: 4.35 X10*6/UL (ref 4.5–5.9)
SODIUM SERPL-SCNC: 138 MMOL/L (ref 136–145)
TRIGL SERPL-MCNC: 184 MG/DL (ref 0–149)
VLDL: 37 MG/DL (ref 0–40)
WBC # BLD AUTO: 7.6 X10*3/UL (ref 4.4–11.3)

## 2023-12-22 PROCEDURE — 86140 C-REACTIVE PROTEIN: CPT

## 2023-12-22 PROCEDURE — 85652 RBC SED RATE AUTOMATED: CPT

## 2023-12-22 PROCEDURE — 84153 ASSAY OF PSA TOTAL: CPT

## 2023-12-22 PROCEDURE — 36415 COLL VENOUS BLD VENIPUNCTURE: CPT

## 2023-12-22 PROCEDURE — 80061 LIPID PANEL: CPT

## 2023-12-22 PROCEDURE — 85025 COMPLETE CBC W/AUTO DIFF WBC: CPT

## 2023-12-22 PROCEDURE — 80053 COMPREHEN METABOLIC PANEL: CPT

## 2023-12-22 NOTE — TELEPHONE ENCOUNTER
----- Message from Dalton Tabor MD sent at 12/22/2023  3:46 PM EST -----  Labs reviewed and are significantly improved.  Continue same and follow-up as scheduled.  Thanks.  ----- Message -----  From: Lab, Background User  Sent: 12/22/2023  10:57 AM EST  To: Dalton Tabor MD

## 2023-12-23 ENCOUNTER — APPOINTMENT (OUTPATIENT)
Dept: RADIOLOGY | Facility: CLINIC | Age: 58
End: 2023-12-23
Payer: COMMERCIAL

## 2023-12-26 ENCOUNTER — APPOINTMENT (OUTPATIENT)
Dept: PHYSICAL THERAPY | Facility: CLINIC | Age: 58
End: 2023-12-26
Payer: COMMERCIAL

## 2024-01-02 ENCOUNTER — APPOINTMENT (OUTPATIENT)
Dept: PHYSICAL THERAPY | Facility: CLINIC | Age: 59
End: 2024-01-02
Payer: COMMERCIAL

## 2024-01-03 NOTE — TELEPHONE ENCOUNTER
Result Communication    Resulted Orders   MR cervical spine wo IV contrast    Narrative    Interpreted By:  Jose Odonnell,   STUDY:  MR CERVICAL SPINE WO IV CONTRAST;  12/20/2023 12:33 pm      INDICATION:  Signs/Symptoms:pain, numbness, weakness.      COMPARISON:  09/21/2022 cervical MR      ACCESSION NUMBER(S):  [id number]      ORDERING CLINICIAN:  RAMIRO BARKER      TECHNIQUE:  Sagittal T1, T2, STIR, axial T1 and axial T2 weighted images were  acquired through the cervical spine.      FINDINGS:  Interbody spacing device affixed with superior inferior screws has  been placed at the C6-7 disc level since the prior exam. The anterior  screw plate fusion device with solid bony fusion between C4 and C6 is  unchanged.      The remaining vertebral body heights are normal.      No intrinsic abnormality of the spinal cord is noted.      Craniocervical junction: No mass the foramen magnum is noted. The  atlanto odontoid articulation has moderate degenerative changes.      C2-3: No stenosis is noted.      C3-4: The spinal canal is mildly to moderately stenosed by disc bulge  and uncovertebral spurring with disc bulge abutting cord, unchanged.      C4-5: The spinal canal is mildly to moderately stenosed by endplate  spurring and posterior ligament thickening with the endplate spurs  abutting the cord similar to prior exam.      C5-6: The right neural foramen is mildly stenosed by uncovertebral  spurring; the spinal canal is mildly stenosed by uncovertebral  spurring abutting the ventral cord, unchanged.      C6-7: The central left spinal canal is moderately stenosed by  endplate spurring indenting the cord although less so than on the  prior exam. A previously noted fragment of disc in the left lateral  recess is no longer identified.      C7-T1: No stenosis is noted.      T1-2 and T2-3: No stenoses are noted on the sagittal images.        Impression    Degenerative changes are present as noted above. The spinal  canal/lateral  recess stenosis at C6-7 has improved as the previously  noted disc fragment along the left lateral recess and spinal canal is  no longer identified.      MACRO:  None      Signed by: Jose Odonnell 12/20/2023 3:14 PM  Dictation workstation:   [id number]       7:55 AM      Results were successfully communicated with the patient and they acknowledged their understanding.

## 2024-01-04 DIAGNOSIS — M54.9 UPPER BACK PAIN: ICD-10-CM

## 2024-01-04 DIAGNOSIS — M54.16 CHRONIC RADICULAR LOW BACK PAIN: ICD-10-CM

## 2024-01-04 DIAGNOSIS — G89.29 CHRONIC RADICULAR LOW BACK PAIN: ICD-10-CM

## 2024-01-04 DIAGNOSIS — M54.50 LUMBAR PAIN: ICD-10-CM

## 2024-01-04 RX ORDER — OXYCODONE AND ACETAMINOPHEN 5; 325 MG/1; MG/1
1-2 TABLET ORAL EVERY 8 HOURS PRN
Qty: 75 TABLET | Refills: 0 | Status: SHIPPED | OUTPATIENT
Start: 2024-01-04 | End: 2024-02-12 | Stop reason: SDUPTHER

## 2024-01-04 RX ORDER — TRAMADOL HYDROCHLORIDE 50 MG/1
50 TABLET ORAL 2 TIMES DAILY PRN
Qty: 60 TABLET | Refills: 0 | Status: SHIPPED | OUTPATIENT
Start: 2024-01-04 | End: 2024-02-05 | Stop reason: SDUPTHER

## 2024-01-04 NOTE — TELEPHONE ENCOUNTER
----- Message from Mikey Jean sent at 1/3/2024  6:09 PM EST -----  Regarding: Refills  Contact: [phone]  Greetings   Can I please get refill on my tramadol please?   The Percocet is right behind it too.     Than you   Mikey Jean  [phone]

## 2024-01-11 ENCOUNTER — TELEPHONE (OUTPATIENT)
Dept: PRIMARY CARE | Facility: CLINIC | Age: 59
End: 2024-01-11
Payer: COMMERCIAL

## 2024-01-11 DIAGNOSIS — J30.2 SEASONAL ALLERGIES: Primary | ICD-10-CM

## 2024-01-11 RX ORDER — MONTELUKAST SODIUM 10 MG/1
10 TABLET ORAL NIGHTLY
Qty: 30 TABLET | Refills: 5 | Status: SHIPPED | OUTPATIENT
Start: 2024-01-11 | End: 2024-07-09

## 2024-01-11 NOTE — TELEPHONE ENCOUNTER
Seen for this on 12-21-23. Please advise.    ----- Message from Mikey Jean sent at 1/11/2024  7:45 AM EST -----  Regarding: Congestion  Contact: [phone]  Good morning   I am still congested. Coughing up thick yellow gook and now in my nose. I took a couple of covid tests over the last two weeks. Negative. I took my Taltz on the 5th. So either I never got rid of this thing or Taltz is a possible cause. Just a thought. What can I do now? Thank you Mikey

## 2024-01-12 ENCOUNTER — OFFICE VISIT (OUTPATIENT)
Dept: PAIN MEDICINE | Facility: CLINIC | Age: 59
End: 2024-01-12
Payer: COMMERCIAL

## 2024-01-12 VITALS
BODY MASS INDEX: 26.2 KG/M2 | WEIGHT: 183 LBS | HEART RATE: 94 BPM | TEMPERATURE: 95.4 F | RESPIRATION RATE: 18 BRPM | DIASTOLIC BLOOD PRESSURE: 81 MMHG | HEIGHT: 70 IN | SYSTOLIC BLOOD PRESSURE: 131 MMHG

## 2024-01-12 DIAGNOSIS — M96.1 LUMBAR POSTLAMINECTOMY SYNDROME: ICD-10-CM

## 2024-01-12 DIAGNOSIS — M47.817 LUMBOSACRAL SPONDYLOSIS WITHOUT MYELOPATHY: Primary | ICD-10-CM

## 2024-01-12 PROCEDURE — 99213 OFFICE O/P EST LOW 20 MIN: CPT | Performed by: ANESTHESIOLOGY

## 2024-01-12 PROCEDURE — 1036F TOBACCO NON-USER: CPT | Performed by: ANESTHESIOLOGY

## 2024-01-12 PROCEDURE — 3008F BODY MASS INDEX DOCD: CPT | Performed by: ANESTHESIOLOGY

## 2024-01-12 ASSESSMENT — ENCOUNTER SYMPTOMS
BACK PAIN: 1
SHORTNESS OF BREATH: 0
FEVER: 0

## 2024-01-12 ASSESSMENT — PAIN - FUNCTIONAL ASSESSMENT: PAIN_FUNCTIONAL_ASSESSMENT: 0-10

## 2024-01-12 ASSESSMENT — PAIN SCALES - GENERAL
PAINLEVEL: 4
PAINLEVEL_OUTOF10: 4

## 2024-01-12 ASSESSMENT — PAIN DESCRIPTION - DESCRIPTORS: DESCRIPTORS: DULL;SHARP

## 2024-01-12 NOTE — PROGRESS NOTES
Chief Complain  Follow-up (From Lumbar RFA 12/01 with 75% relief on left and 50% on right. Use Ice when needed)       History Of Present Illness  Mikey Jean is a 58 y.o. male here for low back pain. The patient rates the pain at 4  on a scale from 0-10.  The patient describes pain as sharp, dull.  The pain is worsened by  lifting  and is alleviated by medications prescribed pain medications.  Since the last visit the pain has improved.  The patient denies any fever, chills, weight loss, bladder/bowel incontinence.     Previous Procedures:  Bilateral L3,4,5 RFA: on 12/01/2023 more than 50% improvement in his low back pain    Past Medical History  He has a past medical history of Ankylosing spondylitis of site in spine (CMS/[facility]), Chronic pain disorder, Contact with and (suspected) exposure to covid-19 (01/13/2021), Encounter for general adult medical examination without abnormal findings (05/04/2022), Joint pain, Low back pain, Lumbosacral disc disease, Neck pain, Osteoarthritis, Peripheral neuropathy, and Spinal stenosis.    Surgical History  He has a past surgical history that includes CT angio coronary art with heartflow if score >30% (09/15/2023); Back surgery; Laminectomy; Neck surgery; Spine surgery; Trigger point injection; Spinal fusion; and Epidural block injection.     Social History  He reports that he has never smoked. He has never used smokeless tobacco. He reports current alcohol use. He reports that he does not use drugs.    Family History  Family History   Problem Relation Name Age of Onset    Cancer Mother      Other ( maker) Brother          Allergies  Patient has no known allergies.    Review of Systems  Review of Systems   Constitutional:  Negative for fever.   Respiratory:  Negative for shortness of breath.    Cardiovascular:  Negative for chest pain.   Musculoskeletal:  Positive for back pain.   Psychiatric/Behavioral:  Negative for suicidal ideas.         Physical Exam  Physical  "Exam  HENT:      Head: Normocephalic and atraumatic.   Eyes:      Extraocular Movements: Extraocular movements intact.      Pupils: Pupils are equal, round, and reactive to light.   Pulmonary:      Effort: Pulmonary effort is normal.   Musculoskeletal:      Cervical back: Neck supple.   Neurological:      Mental Status: He is alert.   Psychiatric:         Mood and Affect: Mood normal.           Last Recorded Vitals  Blood pressure 131/81, pulse 94, temperature 35.2 °C (95.4 °F), resp. rate 18, height 1.778 m (5' 10\"), weight 83 kg (183 lb).       Assessment/Plan     Mikey Jean is a 58 y.o. male here for follow-up of chronic low back pain.  Last visit he had bilateral L3, 4, 5 lumbar facet medial branch radiofrequency ablation.  The procedure provided him with 75% pain relief on left and 50% pain relief on the right side.  The pain relief is still ongoing.  Since the last visit he also reports some pain and numbness in his neck, shoulder and left jaw, recently had an MRI of his cervical spine which did not reveal any severe spinal canal or neural foraminal stenosis, fusion between C4-C7 looked all right.  Symptoms are not consistent at this point comes and goes.  He has already seen his neurosurgery office for the symptoms.  No interventions are necessary from pain standpoint.  Continue with medical management per her current provider.  Repeat of RFA as needed.       Jayce Luong MD  "

## 2024-01-18 DIAGNOSIS — M54.16 RADICULOPATHY, LUMBAR REGION: ICD-10-CM

## 2024-01-18 DIAGNOSIS — G89.29 OTHER CHRONIC PAIN: ICD-10-CM

## 2024-01-18 RX ORDER — CYCLOBENZAPRINE HCL 10 MG
5-10 TABLET ORAL NIGHTLY PRN
Qty: 90 TABLET | Refills: 1 | Status: SHIPPED | OUTPATIENT
Start: 2024-01-18

## 2024-02-05 DIAGNOSIS — M54.50 LUMBAR PAIN: ICD-10-CM

## 2024-02-05 DIAGNOSIS — M54.9 UPPER BACK PAIN: ICD-10-CM

## 2024-02-05 RX ORDER — TRAMADOL HYDROCHLORIDE 50 MG/1
50 TABLET ORAL 2 TIMES DAILY PRN
Qty: 60 TABLET | Refills: 0 | Status: SHIPPED | OUTPATIENT
Start: 2024-02-05 | End: 2024-03-04 | Stop reason: SDUPTHER

## 2024-02-05 NOTE — TELEPHONE ENCOUNTER
----- Message from Mikey Jean sent at 2/5/2024 12:03 PM EST -----  Regarding: Refills   Contact: [phone]  I thought I had sent this last week. I am out of tramadol and gabapentin. Any chance we can fill today please?  Thank you.  Mikey

## 2024-02-12 DIAGNOSIS — M54.16 CHRONIC RADICULAR LOW BACK PAIN: ICD-10-CM

## 2024-02-12 DIAGNOSIS — G89.29 CHRONIC RADICULAR LOW BACK PAIN: ICD-10-CM

## 2024-02-12 RX ORDER — OXYCODONE AND ACETAMINOPHEN 5; 325 MG/1; MG/1
1-2 TABLET ORAL EVERY 8 HOURS PRN
Qty: 75 TABLET | Refills: 0 | Status: SHIPPED | OUTPATIENT
Start: 2024-02-12 | End: 2024-03-04 | Stop reason: SDUPTHER

## 2024-02-12 NOTE — TELEPHONE ENCOUNTER
----- Message from Mikey Jean sent at 2/10/2024 11:01 AM EST -----  Regarding: Refills   Contact: [phone]  Good day   I am writing for two things.   Percocet refill. If I need to come in,just let me know.   I am also still congested. It’s in my lungs and sinus’s   Coughing up yellowish thick gunk. I stopped taking the Taltz to see if that is it. It’s only been a few days. I see the rheumatologist on Wednesday to tell him.   Thank you  Mikey

## 2024-02-15 DIAGNOSIS — G89.29 OTHER CHRONIC PAIN: ICD-10-CM

## 2024-02-15 DIAGNOSIS — M54.16 RADICULOPATHY, LUMBAR REGION: ICD-10-CM

## 2024-02-16 RX ORDER — MELOXICAM 15 MG/1
15 TABLET ORAL DAILY
Qty: 90 TABLET | Refills: 0 | Status: SHIPPED | OUTPATIENT
Start: 2024-02-16 | End: 2024-05-14

## 2024-03-04 DIAGNOSIS — M79.10 MYALGIA: ICD-10-CM

## 2024-03-04 DIAGNOSIS — M54.9 UPPER BACK PAIN: ICD-10-CM

## 2024-03-04 DIAGNOSIS — M54.16 CHRONIC RADICULAR LOW BACK PAIN: ICD-10-CM

## 2024-03-04 DIAGNOSIS — G89.29 CHRONIC RADICULAR LOW BACK PAIN: ICD-10-CM

## 2024-03-04 DIAGNOSIS — M54.50 LUMBAR PAIN: ICD-10-CM

## 2024-03-04 RX ORDER — OXYCODONE AND ACETAMINOPHEN 5; 325 MG/1; MG/1
1-2 TABLET ORAL EVERY 8 HOURS PRN
Qty: 75 TABLET | Refills: 0 | Status: SHIPPED | OUTPATIENT
Start: 2024-03-04 | End: 2024-04-03 | Stop reason: SDUPTHER

## 2024-03-04 RX ORDER — TRAMADOL HYDROCHLORIDE 50 MG/1
50 TABLET ORAL 2 TIMES DAILY PRN
Qty: 60 TABLET | Refills: 0 | Status: SHIPPED | OUTPATIENT
Start: 2024-03-04 | End: 2024-04-03 | Stop reason: SDUPTHER

## 2024-03-04 RX ORDER — DULOXETIN HYDROCHLORIDE 60 MG/1
60 CAPSULE, DELAYED RELEASE ORAL DAILY
Qty: 30 CAPSULE | Refills: 2 | Status: SHIPPED | OUTPATIENT
Start: 2024-03-04 | End: 2024-06-03

## 2024-03-06 ENCOUNTER — OFFICE VISIT (OUTPATIENT)
Dept: PRIMARY CARE | Facility: CLINIC | Age: 59
End: 2024-03-06
Payer: COMMERCIAL

## 2024-03-06 VITALS
HEIGHT: 70 IN | BODY MASS INDEX: 26.77 KG/M2 | RESPIRATION RATE: 16 BRPM | HEART RATE: 92 BPM | WEIGHT: 187 LBS | DIASTOLIC BLOOD PRESSURE: 70 MMHG | TEMPERATURE: 97.7 F | OXYGEN SATURATION: 97 % | SYSTOLIC BLOOD PRESSURE: 110 MMHG

## 2024-03-06 DIAGNOSIS — M45.9 ANKYLOSING SPONDYLITIS OF UNSPECIFIED SITES IN SPINE (MULTI): Primary | ICD-10-CM

## 2024-03-06 DIAGNOSIS — M26.629 TMJ SYNDROME: ICD-10-CM

## 2024-03-06 DIAGNOSIS — F41.9 ANXIETY DISORDER, UNSPECIFIED TYPE: ICD-10-CM

## 2024-03-06 DIAGNOSIS — E78.5 DYSLIPIDEMIA: ICD-10-CM

## 2024-03-06 PROCEDURE — 1036F TOBACCO NON-USER: CPT | Performed by: FAMILY MEDICINE

## 2024-03-06 PROCEDURE — 3008F BODY MASS INDEX DOCD: CPT | Performed by: FAMILY MEDICINE

## 2024-03-06 PROCEDURE — 99214 OFFICE O/P EST MOD 30 MIN: CPT | Performed by: FAMILY MEDICINE

## 2024-03-06 RX ORDER — SECUKINUMAB 150 MG/ML
300 INJECTION SUBCUTANEOUS
COMMUNITY
End: 2024-04-19 | Stop reason: WASHOUT

## 2024-03-06 ASSESSMENT — PATIENT HEALTH QUESTIONNAIRE - PHQ9
SUM OF ALL RESPONSES TO PHQ9 QUESTIONS 1 AND 2: 0
1. LITTLE INTEREST OR PLEASURE IN DOING THINGS: NOT AT ALL
2. FEELING DOWN, DEPRESSED OR HOPELESS: NOT AT ALL

## 2024-03-06 NOTE — PROGRESS NOTES
Subjective   Patient ID: Mikey Jean is a 58 y.o. male who presents for Back Pain.  HPI  Patient presents today for a follow-up on Back Pain. Is taking Percocet 5-325 MG, and Tramadol 50 MG. States he has no concerns with these medications. Rates the pain a 5/10 over the past 7 days. Reports that the medication gives 50 TO 60% pain control/relief. [place]AR[place] reviewed today. Controlled substance contract signed today. UDS 6-21-23.    States he is still coughing up congestion. Quit Taltz, and was started on Cosentyx. He states the Taltz worked better than the Cosentyx.     States he is starting to get some left ear pain. Wondering if he can continue the Singulair?       Has no other new problem /question.    Review of systems  ; Patient seen today for exam denies any problems with headaches or vision, denies any shortness of breath chest pain nausea or vomiting, no black stool no blood in the stool no heartburn type symptoms denies any problems with constipation or diarrhea, and no dysuria-type symptoms    The patient's allergies medications were reviewed with them today    The patient's social family and surgical history or also reviewed here today, along with her past medical history.     Objective     Alert and active in  no acute distress  HEENT TMs clear oropharynx negative nares clear no drainage noted neck supple//left TM joint tenderness  With no adenopathy   Heart regular rate and rhythm without murmur and no carotid bruits  Lungs- clear to auscultation bilaterally, no wheeze or rhonchi noted  Thyroid -negative masses or nodularity  Abdomen- soft times four quadrants , bowel sounds positive no masses or organomegaly, negative tenderness guarding or rebound  Neurological exam unremarkable- DTRs in upper and lower extremities within normal limits.   skin -no lesions noted      /70 (BP Location: Right arm, Patient Position: Sitting, BP Cuff Size: Adult)   Pulse 92   Temp 36.5 °C (97.7 °F) (Temporal)   Resp  "16   Ht 1.778 m (5' 10\")   Wt 84.8 kg (187 lb)   SpO2 97%   BMI 26.83 kg/m²     No Known Allergies    Assessment/Plan   Problem List Items Addressed This Visit       Anxiety disorder    Dyslipidemia    BMI 26.0-26.9,adult    Ankylosing spondylitis of unspecified sites in spine (CMS/[facility]) - Primary     Other Visit Diagnoses       TMJ syndrome                  Long talk. Treatment options reviewed.  BP controlled. Mood stable.  Pain controlled with medication.   Continue to use the least amount of medication prescribed to manage symptoms.   Left ear pain discussed. Consistent with TMJ.   Can continue to take Singulair through the Spring.   Continue to see specialists as planned.    Continue and take your medications as prescribed.    Health Maintenance issues discussed.  Cologuard is up-to-date. Will hold off on Tdap at this time.    Importance of healthy diet and regular exercise regimen discussed.    If anything worsens or changes please call us at once. Follow up in the office as planned.      Scribe Attestation  By signing my name below, I, Agueda DHALIWAL, Scribe   attest that this documentation has been prepared under the direction and in the presence of Luis Miguel Jasmine DO.    "

## 2024-04-03 DIAGNOSIS — M54.9 UPPER BACK PAIN: ICD-10-CM

## 2024-04-03 DIAGNOSIS — M54.50 LUMBAR PAIN: ICD-10-CM

## 2024-04-03 DIAGNOSIS — M54.16 CHRONIC RADICULAR LOW BACK PAIN: ICD-10-CM

## 2024-04-03 DIAGNOSIS — G89.29 CHRONIC RADICULAR LOW BACK PAIN: ICD-10-CM

## 2024-04-03 RX ORDER — TRAMADOL HYDROCHLORIDE 50 MG/1
50 TABLET ORAL 2 TIMES DAILY PRN
Qty: 60 TABLET | Refills: 0 | Status: SHIPPED | OUTPATIENT
Start: 2024-04-03 | End: 2024-05-02 | Stop reason: SDUPTHER

## 2024-04-03 RX ORDER — OXYCODONE AND ACETAMINOPHEN 5; 325 MG/1; MG/1
1-2 TABLET ORAL EVERY 8 HOURS PRN
Qty: 75 TABLET | Refills: 0 | Status: SHIPPED | OUTPATIENT
Start: 2024-04-03 | End: 2024-05-02 | Stop reason: SDUPTHER

## 2024-04-03 NOTE — TELEPHONE ENCOUNTER
----- Message from Mikey Jean sent at 4/2/2024  8:22 PM EDT -----  Regarding: Meds. Vusit  Contact: [phone]  Good morning. I will need refills on tramadol and then shortly after, Percocet. I’d like to set up a visit as well. My wife wants to come as well. I have same issues. Congestion. Now I’m always crabby, tired like a general malaise   It’s getting worse.   Just a check up from the neck up.     Thanks

## 2024-04-15 ENCOUNTER — APPOINTMENT (OUTPATIENT)
Dept: PRIMARY CARE | Facility: CLINIC | Age: 59
End: 2024-04-15
Payer: COMMERCIAL

## 2024-04-15 NOTE — PROGRESS NOTES
Subjective   Patient ID: Mikey Jean is a 58 y.o. male who presents for No chief complaint on file..  HPI  pt is being seen for congestion,   ongoing for   other symptoms includes  pt did/ did not tests for Covid  pt is / is taking    No other concern /question     Review of systems  ; Patient seen today for exam denies any problems with headaches or vision, denies any shortness of breath chest pain nausea or vomiting, no black stool no blood in the stool no heartburn type symptoms denies any problems with constipation or diarrhea, and no dysuria-type symptoms    The patient's allergies medications were reviewed with them today    The patient's social family and surgical history or also reviewed here today, along with her past medical history.     Objective     Alert and active in  no acute distress  HEENT TMs clear oropharynx negative nares clear no drainage noted neck supple  With no adenopathy   Heart regular rate and rhythm without murmur and no carotid bruits  Lungs- clear to auscultation bilaterally, no wheeze or rhonchi noted  Thyroid -negative masses or nodularity  Abdomen- soft times four quadrants , bowel sounds positive no masses or organomegaly, negative tenderness guarding or rebound  Neurological exam unremarkable- DTRs in upper and lower extremities within normal limits.   skin -no lesions noted      There were no vitals taken for this visit.    No Known Allergies    Assessment/Plan   Problem List Items Addressed This Visit       Anxiety disorder    Dyslipidemia    Insomnia    Myalgia    Neuropathy    Vitamin D deficiency    Vitamin B12 deficiency         If anything worsens or changes please call us at once, follow up in the office as planned,

## 2024-04-19 ENCOUNTER — OFFICE VISIT (OUTPATIENT)
Dept: PRIMARY CARE | Facility: CLINIC | Age: 59
End: 2024-04-19
Payer: COMMERCIAL

## 2024-04-19 VITALS
WEIGHT: 180 LBS | TEMPERATURE: 97.6 F | SYSTOLIC BLOOD PRESSURE: 106 MMHG | DIASTOLIC BLOOD PRESSURE: 56 MMHG | BODY MASS INDEX: 25.77 KG/M2 | HEIGHT: 70 IN | OXYGEN SATURATION: 97 % | HEART RATE: 96 BPM | RESPIRATION RATE: 16 BRPM

## 2024-04-19 DIAGNOSIS — M45.9 ANKYLOSING SPONDYLITIS OF UNSPECIFIED SITES IN SPINE (MULTI): ICD-10-CM

## 2024-04-19 DIAGNOSIS — E55.9 VITAMIN D DEFICIENCY: ICD-10-CM

## 2024-04-19 DIAGNOSIS — F41.9 ANXIETY DISORDER, UNSPECIFIED TYPE: ICD-10-CM

## 2024-04-19 DIAGNOSIS — K21.9 GASTROESOPHAGEAL REFLUX DISEASE, UNSPECIFIED WHETHER ESOPHAGITIS PRESENT: ICD-10-CM

## 2024-04-19 DIAGNOSIS — R09.89 CHEST CONGESTION: ICD-10-CM

## 2024-04-19 DIAGNOSIS — E53.8 VITAMIN B12 DEFICIENCY: ICD-10-CM

## 2024-04-19 DIAGNOSIS — R53.83 MALAISE AND FATIGUE: ICD-10-CM

## 2024-04-19 DIAGNOSIS — E78.5 DYSLIPIDEMIA: Primary | ICD-10-CM

## 2024-04-19 DIAGNOSIS — R53.81 MALAISE AND FATIGUE: ICD-10-CM

## 2024-04-19 PROCEDURE — 3008F BODY MASS INDEX DOCD: CPT | Performed by: FAMILY MEDICINE

## 2024-04-19 PROCEDURE — 1036F TOBACCO NON-USER: CPT | Performed by: FAMILY MEDICINE

## 2024-04-19 PROCEDURE — 99215 OFFICE O/P EST HI 40 MIN: CPT | Performed by: FAMILY MEDICINE

## 2024-04-19 RX ORDER — IXEKIZUMAB 80 MG/ML
80 INJECTION, SOLUTION SUBCUTANEOUS
COMMUNITY

## 2024-04-19 RX ORDER — CEFDINIR 300 MG/1
600 CAPSULE ORAL DAILY
Qty: 20 CAPSULE | Refills: 0 | Status: SHIPPED | OUTPATIENT
Start: 2024-04-19 | End: 2024-04-29

## 2024-04-19 RX ORDER — PANTOPRAZOLE SODIUM 40 MG/1
40 TABLET, DELAYED RELEASE ORAL DAILY
Qty: 30 TABLET | Refills: 1 | Status: SHIPPED | OUTPATIENT
Start: 2024-04-19 | End: 2024-06-10

## 2024-04-19 NOTE — PROGRESS NOTES
Subjective   Patient ID: Mikey Jean is a 58 y.o. male who presents for chest congestion and Fatigue.    HPI    Patient presents today for ongoing congestion.   Ongoing x 6 month.  States it is in chest and sometimes in his head.  He has thick, yellow drainage.   He is coughing up phlegm.   He does get warm in the morning, but does not check for fever.  Denies chills or body aches.   Denies it getting worse.  Rheumatologist mentioned seeing a pulmonologist.   He can breathe through both nasal canals.   Denies acid reflux.   He is worse morning and after a meal.    States he has a general malaise.  Ongoing x the past 2 months.   He has no energy.  He is constantly tired.   He is getting up several times during the night.   He will sleep until 10am on the weekends.  He usually goes to bed around 10 pm, and wakes up 7:30 am during the week.   He does snore.  Has not fallen asleep while driving.  Has not had a sleep study done.   Wife has told him that he stops breathing while sleeping sometimes.  He does sleep on his back more.  Admits to more tunnel vision when driving.  Denies headaches.   He does not nap.    He has been having memory issues.  He states he is hunting for words.  He is not articulate.   He has difficulty with recalling things.   He states he was taking a steroid in between medications.         Review of systems  ; Patient seen today for exam denies any problems with headaches or vision, denies any shortness of breath chest pain nausea or vomiting, no black stool no blood in the stool no heartburn type symptoms denies any problems with constipation or diarrhea, and no dysuria-type symptoms    The patient's allergies medications were reviewed with them today    The patient's social family and surgical history or also reviewed here today, along with her past medical history.     Objective     Alert and active in  no acute distress  HEENT TMs clear oropharynx negative nares clear no drainage noted neck  "supple  With no adenopathy   Heart regular rate and rhythm without murmur and no carotid bruits  Lungs- clear to auscultation bilaterally, no wheeze or rhonchi noted  Thyroid -negative masses or nodularity  Abdomen- soft times four quadrants , bowel sounds positive no masses or organomegaly, negative tenderness guarding or rebound  Neurological exam unremarkable- DTRs in upper and lower extremities within normal limits.   skin -no lesions noted      /56 (BP Location: Left arm, Patient Position: Sitting, BP Cuff Size: Adult)   Pulse 96   Temp 36.4 °C (97.6 °F) (Temporal)   Resp 16   Ht 1.778 m (5' 10\")   Wt 81.6 kg (180 lb)   SpO2 97%   BMI 25.83 kg/m²     No Known Allergies    Assessment/Plan   Problem List Items Addressed This Visit       Anxiety disorder    Dyslipidemia - Primary    Vitamin D deficiency    Vitamin B12 deficiency    Relevant Orders    Vitamin B12    Ankylosing spondylitis of unspecified sites in spine (Multi)     Other Visit Diagnoses       Chest congestion        Relevant Medications    cefdinir (Omnicef) 300 mg capsule    Malaise and fatigue        Relevant Orders    Home sleep apnea test (HSAT)    Testosterone    BMI 25.0-25.9,adult        Gastroesophageal reflux disease, unspecified whether esophagitis present        Relevant Medications    pantoprazole (ProtoNix) 40 mg EC tablet          Home sleep study ordered.    Discussed low testosterone with him and replacement options.    Stop Singulair if he feels this is not helping him.    Pantoprazole prescribed today.  Take this every morning for at least month.    Cefdinir prescribed today.     He should not drink a lot of alcohol with his narcotics.     Labs have been ordered, she/he will have these performed and we will contact her/him with results.  (Vitamin B12, Testosterone)    If anything worsens or changes please call us at once, follow up in the office as planned.    Scribe Attestation  By signing my name below, IRosa " INA Kendall, Gio   attest that this documentation has been prepared under the direction and in the presence of Luis Miguel Jasmine DO.

## 2024-04-30 ENCOUNTER — LAB (OUTPATIENT)
Dept: LAB | Facility: LAB | Age: 59
End: 2024-04-30
Payer: COMMERCIAL

## 2024-04-30 DIAGNOSIS — E53.8 VITAMIN B12 DEFICIENCY: ICD-10-CM

## 2024-04-30 DIAGNOSIS — R53.83 MALAISE AND FATIGUE: ICD-10-CM

## 2024-04-30 DIAGNOSIS — R53.81 MALAISE AND FATIGUE: ICD-10-CM

## 2024-04-30 LAB — TESTOST SERPL-MCNC: 257 NG/DL (ref 240–1000)

## 2024-04-30 PROCEDURE — 82607 VITAMIN B-12: CPT

## 2024-04-30 PROCEDURE — 84403 ASSAY OF TOTAL TESTOSTERONE: CPT

## 2024-04-30 PROCEDURE — 36415 COLL VENOUS BLD VENIPUNCTURE: CPT

## 2024-05-01 LAB — VIT B12 SERPL-MCNC: 1129 PG/ML (ref 211–911)

## 2024-05-02 DIAGNOSIS — M54.9 UPPER BACK PAIN: ICD-10-CM

## 2024-05-02 DIAGNOSIS — G62.9 NEUROPATHY: ICD-10-CM

## 2024-05-02 DIAGNOSIS — M54.50 LUMBAR PAIN: ICD-10-CM

## 2024-05-02 DIAGNOSIS — R79.89 LOW TESTOSTERONE IN MALE: ICD-10-CM

## 2024-05-02 DIAGNOSIS — M54.16 CHRONIC RADICULAR LOW BACK PAIN: ICD-10-CM

## 2024-05-02 DIAGNOSIS — G89.29 CHRONIC RADICULAR LOW BACK PAIN: ICD-10-CM

## 2024-05-02 RX ORDER — TRAMADOL HYDROCHLORIDE 50 MG/1
50 TABLET ORAL 2 TIMES DAILY PRN
Qty: 60 TABLET | Refills: 0 | Status: SHIPPED | OUTPATIENT
Start: 2024-05-02 | End: 2024-06-04 | Stop reason: SDUPTHER

## 2024-05-02 RX ORDER — TESTOSTERONE 20.25 MG/1.25G
GEL TOPICAL
Qty: 75 G | Refills: 1 | Status: SHIPPED | OUTPATIENT
Start: 2024-05-02

## 2024-05-02 RX ORDER — GABAPENTIN 300 MG/1
CAPSULE ORAL
Qty: 150 CAPSULE | Refills: 2 | Status: SHIPPED | OUTPATIENT
Start: 2024-05-02 | End: 2024-06-04 | Stop reason: SDUPTHER

## 2024-05-02 RX ORDER — OXYCODONE AND ACETAMINOPHEN 5; 325 MG/1; MG/1
1-2 TABLET ORAL EVERY 8 HOURS PRN
Qty: 75 TABLET | Refills: 0 | Status: SHIPPED | OUTPATIENT
Start: 2024-05-02 | End: 2024-06-04 | Stop reason: SDUPTHER

## 2024-05-02 NOTE — TELEPHONE ENCOUNTER
----- Message from Mikey Jean sent at 5/2/2024 11:09 AM EDT -----  Regarding: Refills  Contact: [phone]  Good morning. I am checking in on my next refills please.  Tramadol and gabapentin and then in a week or so, the percocet.  Thank you,  Mikey

## 2024-05-02 NOTE — TELEPHONE ENCOUNTER
----- Message from Luis Miguel Jasmine DO sent at 5/1/2024  6:16 PM EDT -----  B12 is good continue what has been taking..  His testosterone was low normal, oftentimes at less than 300 will often will treat it with a gel 1 pump each shoulder after showering for total of 2 pumps per day,, might help his energy make him feel better,, send recommend we try taking some,, some  insurances are difficult with testosterone replacement gels,, if we do have issues we can always have him get it with good Rx over at [facility] which is about $60-$70 for 3 months cash,, but we should try his insurance first if he is agreeable let me know

## 2024-05-08 ENCOUNTER — CLINICAL SUPPORT (OUTPATIENT)
Dept: SLEEP MEDICINE | Facility: HOSPITAL | Age: 59
End: 2024-05-08
Payer: COMMERCIAL

## 2024-05-08 DIAGNOSIS — R53.81 MALAISE AND FATIGUE: ICD-10-CM

## 2024-05-08 DIAGNOSIS — G47.33 OBSTRUCTIVE SLEEP APNEA (ADULT) (PEDIATRIC): ICD-10-CM

## 2024-05-08 DIAGNOSIS — R53.83 MALAISE AND FATIGUE: ICD-10-CM

## 2024-05-08 PROCEDURE — 95806 SLEEP STUDY UNATT&RESP EFFT: CPT | Performed by: PSYCHIATRY & NEUROLOGY

## 2024-05-08 NOTE — PROGRESS NOTES
Type of Study: HOME SLEEP STUDY - NOMAD     The patient received equipment and instructions for use of the [facility] Nomad HSAT device #[id number]. The patient was instructed how to apply the effort belts, cannula, thermistor. It was also explained how the Nomad and oximeter components work.  The patient was asked to record their sleep for an 8-hour period.     The patient was informed of their responsibility for the device and acknowledged this by signing the HSAT device contract. The patient was asked to return the device on 5/9/2024 between the hours of 9-10 am to the [facility].     The patient was instructed to call 911 as usual for any medical- emergencies while at home.  The patient was also given a phone number for troubleshooting when using the device in case there were additional questions.

## 2024-05-14 DIAGNOSIS — M54.16 RADICULOPATHY, LUMBAR REGION: ICD-10-CM

## 2024-05-14 DIAGNOSIS — G89.29 OTHER CHRONIC PAIN: ICD-10-CM

## 2024-05-14 RX ORDER — MELOXICAM 15 MG/1
15 TABLET ORAL DAILY
Qty: 30 TABLET | Refills: 2 | Status: SHIPPED | OUTPATIENT
Start: 2024-05-14

## 2024-05-14 NOTE — TELEPHONE ENCOUNTER
----- Message from Girish Rm MD sent at 5/13/2024  7:40 PM EDT -----  Sleep study is significant for moderate sleep apnea.  Please arrange for auto CPAP 4 to 15 cm water pressure.  Follow-up with Dr. Jasmine after starting CPAP in 2 to 4 weeks to document effectiveness.  Please let him know and arrange

## 2024-05-29 ENCOUNTER — TELEPHONE (OUTPATIENT)
Dept: PRIMARY CARE | Facility: CLINIC | Age: 59
End: 2024-05-29
Payer: COMMERCIAL

## 2024-05-29 DIAGNOSIS — R05.3 CHRONIC COUGH: ICD-10-CM

## 2024-05-29 DIAGNOSIS — J32.1 CHRONIC FRONTAL SINUSITIS: Primary | ICD-10-CM

## 2024-05-29 NOTE — TELEPHONE ENCOUNTER
----- Message from Mikey Jean sent at 5/25/2024 10:54 AM EDT -----  Regarding: Congestion  Contact: [phone]  Hi. We can wait until Brad gets back in. Thank karlo SPARROW Do [id number] [place] [id number] Clinical Support Staff (supporting Luis Miguel Jasmine DO)5 days ago       Nghia Salinas  I am still having congestion issues. The new med you gave me does not seem to make a difference. I know at one point I thought we talked about a chest XRay.  What else can we do?  I did th e sleep study. Curious to hear what you think of the results.  Thanks  Mikey

## 2024-06-01 DIAGNOSIS — M79.10 MYALGIA: ICD-10-CM

## 2024-06-03 RX ORDER — DULOXETIN HYDROCHLORIDE 60 MG/1
60 CAPSULE, DELAYED RELEASE ORAL DAILY
Qty: 90 CAPSULE | Refills: 1 | Status: SHIPPED | OUTPATIENT
Start: 2024-06-03

## 2024-06-03 NOTE — TELEPHONE ENCOUNTER
Patient was scheduled for 6/11/24 for CT Scan    juan Jasmine DO   to Do [id number] [facility] Clerical       5/29/24  9:14 PM  I spoke with patient tonight for 25 minutes regarding testing he has this chronic sinus and cough I ordered an x-ray that he will get his convenience of his chest and I would like him to have a CT of his sinuses order put in already,,, he will also see his dentist,, we discussed his sleep apnea testing also his may need to do a sleep study will hold off on this until we get these results thank you

## 2024-06-04 DIAGNOSIS — G89.29 CHRONIC RADICULAR LOW BACK PAIN: ICD-10-CM

## 2024-06-04 DIAGNOSIS — M54.50 LUMBAR PAIN: ICD-10-CM

## 2024-06-04 DIAGNOSIS — M54.9 UPPER BACK PAIN: ICD-10-CM

## 2024-06-04 DIAGNOSIS — G62.9 NEUROPATHY: ICD-10-CM

## 2024-06-04 DIAGNOSIS — M54.16 CHRONIC RADICULAR LOW BACK PAIN: ICD-10-CM

## 2024-06-04 NOTE — TELEPHONE ENCOUNTER
----- Message from Ellyn Jean sent at 6/4/2024  4:01 PM EDT -----  Regarding: refills  Contact: [phone]    Good afternoon  I am a little late in doing so, but my     Tramadol is up. My Percocet and Gabapentin would follow in a few days  Thanks you  ellyn

## 2024-06-05 RX ORDER — OXYCODONE AND ACETAMINOPHEN 5; 325 MG/1; MG/1
1-2 TABLET ORAL EVERY 8 HOURS PRN
Qty: 75 TABLET | Refills: 0 | Status: SHIPPED | OUTPATIENT
Start: 2024-06-05

## 2024-06-05 RX ORDER — TRAMADOL HYDROCHLORIDE 50 MG/1
50 TABLET ORAL 2 TIMES DAILY PRN
Qty: 60 TABLET | Refills: 0 | Status: SHIPPED | OUTPATIENT
Start: 2024-06-05 | End: 2024-07-05

## 2024-06-05 RX ORDER — GABAPENTIN 300 MG/1
CAPSULE ORAL
Qty: 150 CAPSULE | Refills: 1 | Status: SHIPPED | OUTPATIENT
Start: 2024-06-05

## 2024-06-08 DIAGNOSIS — K21.9 GASTROESOPHAGEAL REFLUX DISEASE, UNSPECIFIED WHETHER ESOPHAGITIS PRESENT: ICD-10-CM

## 2024-06-10 RX ORDER — PANTOPRAZOLE SODIUM 40 MG/1
40 TABLET, DELAYED RELEASE ORAL DAILY
Qty: 30 TABLET | Refills: 3 | Status: SHIPPED | OUTPATIENT
Start: 2024-06-10

## 2024-06-11 ENCOUNTER — HOSPITAL ENCOUNTER (OUTPATIENT)
Dept: RADIOLOGY | Facility: CLINIC | Age: 59
Discharge: HOME | End: 2024-06-11
Payer: COMMERCIAL

## 2024-06-11 DIAGNOSIS — R05.3 CHRONIC COUGH: ICD-10-CM

## 2024-06-11 DIAGNOSIS — J32.1 CHRONIC FRONTAL SINUSITIS: ICD-10-CM

## 2024-06-11 PROCEDURE — 70486 CT MAXILLOFACIAL W/O DYE: CPT

## 2024-06-11 PROCEDURE — 70486 CT MAXILLOFACIAL W/O DYE: CPT | Performed by: RADIOLOGY

## 2024-06-11 PROCEDURE — 71046 X-RAY EXAM CHEST 2 VIEWS: CPT | Performed by: STUDENT IN AN ORGANIZED HEALTH CARE EDUCATION/TRAINING PROGRAM

## 2024-06-11 PROCEDURE — 71046 X-RAY EXAM CHEST 2 VIEWS: CPT

## 2024-06-13 ENCOUNTER — TELEPHONE (OUTPATIENT)
Dept: PRIMARY CARE | Facility: CLINIC | Age: 59
End: 2024-06-13
Payer: COMMERCIAL

## 2024-06-13 DIAGNOSIS — J32.0 CHRONIC MAXILLARY SINUSITIS: Primary | ICD-10-CM

## 2024-06-13 RX ORDER — MOXIFLOXACIN HYDROCHLORIDE 400 MG/1
400 TABLET ORAL DAILY
Qty: 14 TABLET | Refills: 1 | Status: SHIPPED | OUTPATIENT
Start: 2024-06-13 | End: 2024-06-27

## 2024-06-14 NOTE — TELEPHONE ENCOUNTER
Left him a message to give me a call tomorrow I started him on Avelox 1 each day for 2 weeks for this chronic sinus also have to use Afrin twice a day for 3 to 4 days then stop and also Sudafed 4 to 6-hour Sudafed 1:59 in the morning 1 2 in the afternoon to get air into his sinuses hopefully he feels better after, but I will be in tomorrow to chat         Dr. Jasmine did get in touch with patient. He is aware.

## 2024-06-26 ENCOUNTER — TELEPHONE (OUTPATIENT)
Dept: PRIMARY CARE | Facility: CLINIC | Age: 59
End: 2024-06-26
Payer: COMMERCIAL

## 2024-06-26 NOTE — TELEPHONE ENCOUNTER
PLEASE ADVISE    ----- Message from Mikey Jean sent at 6/26/2024  9:45 AM EDT -----  Regarding: Antibiotics  Contact: [phone]  Good morning    I have a refill left on my antibiotic.  Should I fill it and keep using it, or after 14 days with no noticeable improvent, just stop taking it?  Thanks  Mikey CONLEY I cannot see my rheumatologist until July 23rd

## 2024-07-02 DIAGNOSIS — M54.50 LUMBAR PAIN: ICD-10-CM

## 2024-07-02 DIAGNOSIS — M54.16 CHRONIC RADICULAR LOW BACK PAIN: ICD-10-CM

## 2024-07-02 DIAGNOSIS — M54.9 UPPER BACK PAIN: ICD-10-CM

## 2024-07-02 DIAGNOSIS — G89.29 CHRONIC RADICULAR LOW BACK PAIN: ICD-10-CM

## 2024-07-02 DIAGNOSIS — G62.9 NEUROPATHY: ICD-10-CM

## 2024-07-02 DIAGNOSIS — R79.89 LOW TESTOSTERONE IN MALE: ICD-10-CM

## 2024-07-02 RX ORDER — GABAPENTIN 300 MG/1
CAPSULE ORAL
Qty: 150 CAPSULE | Refills: 1 | Status: CANCELLED | OUTPATIENT
Start: 2024-07-02

## 2024-07-02 RX ORDER — TESTOSTERONE 20.25 MG/1.25G
GEL TOPICAL
Qty: 75 G | Refills: 1 | Status: CANCELLED | OUTPATIENT
Start: 2024-07-02

## 2024-07-02 RX ORDER — TRAMADOL HYDROCHLORIDE 50 MG/1
50 TABLET ORAL 2 TIMES DAILY PRN
Qty: 60 TABLET | Refills: 0 | Status: CANCELLED | OUTPATIENT
Start: 2024-07-02 | End: 2024-08-01

## 2024-07-02 RX ORDER — OXYCODONE AND ACETAMINOPHEN 5; 325 MG/1; MG/1
1-2 TABLET ORAL EVERY 8 HOURS PRN
Qty: 75 TABLET | Refills: 0 | Status: CANCELLED | OUTPATIENT
Start: 2024-07-02

## 2024-07-02 NOTE — PROGRESS NOTES
Patient presents today for a medication agreement & UDS for Percocet & Tramadol.  Last took Percocet last night.  Last took Tramadol this morning.    Testosterone agreement also signed today.

## 2024-07-02 NOTE — TELEPHONE ENCOUNTER
----- Message from Mikey Jean sent at 7/2/2024 10:01 AM EDT -----  Regarding: refills  Contact: [phone]  Good morning     My refills are going to come up over holiday.  I just wanted to get a  if we can.  Thank you  Mikey Shankar  Gabapentin  Percocet (a few days after the Tramadol)

## 2024-07-02 NOTE — TELEPHONE ENCOUNTER
LMOM for patient to call the office back. Patient needs to come into the office to leave a UDS.  Contracts signed 3/6/24.     Last office visit 4/19/24

## 2024-07-03 ENCOUNTER — CLINICAL SUPPORT (OUTPATIENT)
Dept: PRIMARY CARE | Facility: CLINIC | Age: 59
End: 2024-07-03
Payer: COMMERCIAL

## 2024-07-03 DIAGNOSIS — Z51.81 THERAPEUTIC DRUG MONITORING: ICD-10-CM

## 2024-07-03 DIAGNOSIS — R79.89 LOW TESTOSTERONE IN MALE: ICD-10-CM

## 2024-07-03 DIAGNOSIS — M54.16 CHRONIC RADICULAR LOW BACK PAIN: Primary | ICD-10-CM

## 2024-07-03 DIAGNOSIS — G89.29 CHRONIC RADICULAR LOW BACK PAIN: Primary | ICD-10-CM

## 2024-07-03 PROCEDURE — 80346 BENZODIAZEPINES1-12: CPT

## 2024-07-03 PROCEDURE — 80361 OPIATES 1 OR MORE: CPT

## 2024-07-03 PROCEDURE — 80373 DRUG SCREENING TRAMADOL: CPT

## 2024-07-03 PROCEDURE — 82570 ASSAY OF URINE CREATININE: CPT

## 2024-07-03 PROCEDURE — 80365 DRUG SCREENING OXYCODONE: CPT

## 2024-07-03 PROCEDURE — 80354 DRUG SCREENING FENTANYL: CPT

## 2024-07-03 PROCEDURE — 80307 DRUG TEST PRSMV CHEM ANLYZR: CPT

## 2024-07-03 PROCEDURE — 80368 SEDATIVE HYPNOTICS: CPT

## 2024-07-03 PROCEDURE — 80358 DRUG SCREENING METHADONE: CPT

## 2024-07-03 RX ORDER — GABAPENTIN 300 MG/1
CAPSULE ORAL
Qty: 150 CAPSULE | Refills: 1 | Status: SHIPPED | OUTPATIENT
Start: 2024-07-03

## 2024-07-03 RX ORDER — TESTOSTERONE 20.25 MG/1.25G
GEL TOPICAL
Qty: 75 G | Refills: 1 | Status: SHIPPED | OUTPATIENT
Start: 2024-07-03

## 2024-07-03 RX ORDER — TRAMADOL HYDROCHLORIDE 50 MG/1
50 TABLET ORAL 2 TIMES DAILY PRN
Qty: 60 TABLET | Refills: 0 | Status: SHIPPED | OUTPATIENT
Start: 2024-07-03 | End: 2024-08-02

## 2024-07-03 RX ORDER — OXYCODONE AND ACETAMINOPHEN 5; 325 MG/1; MG/1
1-2 TABLET ORAL EVERY 8 HOURS PRN
Qty: 75 TABLET | Refills: 0 | Status: SHIPPED | OUTPATIENT
Start: 2024-07-03

## 2024-07-04 LAB
AMPHETAMINES UR QL SCN: NORMAL
BARBITURATES UR QL SCN: NORMAL
BZE UR QL SCN: NORMAL
CANNABINOIDS UR QL SCN: NORMAL
CREAT UR-MCNC: 117.3 MG/DL (ref 20–370)
PCP UR QL SCN: NORMAL

## 2024-07-08 DIAGNOSIS — G89.29 OTHER CHRONIC PAIN: ICD-10-CM

## 2024-07-08 DIAGNOSIS — M54.16 RADICULOPATHY, LUMBAR REGION: ICD-10-CM

## 2024-07-08 RX ORDER — CYCLOBENZAPRINE HCL 10 MG
TABLET ORAL
Qty: 30 TABLET | Refills: 2 | Status: SHIPPED | OUTPATIENT
Start: 2024-07-08

## 2024-07-09 LAB
1OH-MIDAZOLAM UR CFM-MCNC: <25 NG/ML
6MAM UR CFM-MCNC: <25 NG/ML
7AMINOCLONAZEPAM UR CFM-MCNC: <25 NG/ML
A-OH ALPRAZ UR CFM-MCNC: <25 NG/ML
ALPRAZ UR CFM-MCNC: <25 NG/ML
CHLORDIAZEP UR CFM-MCNC: <25 NG/ML
CLONAZEPAM UR CFM-MCNC: <25 NG/ML
CODEINE UR CFM-MCNC: <50 NG/ML
DIAZEPAM UR CFM-MCNC: <25 NG/ML
EDDP UR CFM-MCNC: <25 NG/ML
FENTANYL UR CFM-MCNC: <2.5 NG/ML
HYDROCODONE CTO UR CFM-MCNC: <25 NG/ML
HYDROMORPHONE UR CFM-MCNC: <25 NG/ML
LORAZEPAM UR CFM-MCNC: <25 NG/ML
METHADONE UR CFM-MCNC: <25 NG/ML
MIDAZOLAM UR CFM-MCNC: <25 NG/ML
MORPHINE UR CFM-MCNC: <50 NG/ML
NORDIAZEPAM UR CFM-MCNC: <25 NG/ML
NORFENTANYL UR CFM-MCNC: <2.5 NG/ML
NORHYDROCODONE UR CFM-MCNC: <25 NG/ML
NOROXYCODONE UR CFM-MCNC: >1000 NG/ML
NORTRAMADOL UR-MCNC: >1000 NG/ML
OXAZEPAM UR CFM-MCNC: <25 NG/ML
OXYCODONE UR CFM-MCNC: 315 NG/ML
OXYMORPHONE UR CFM-MCNC: 498 NG/ML
TEMAZEPAM UR CFM-MCNC: <25 NG/ML
TRAMADOL UR CFM-MCNC: >1000 NG/ML
ZOLPIDEM UR CFM-MCNC: <25 NG/ML
ZOLPIDEM UR-MCNC: <25 NG/ML

## 2024-08-05 ENCOUNTER — PATIENT MESSAGE (OUTPATIENT)
Dept: PRIMARY CARE | Facility: CLINIC | Age: 59
End: 2024-08-05
Payer: COMMERCIAL

## 2024-08-05 DIAGNOSIS — G89.29 OTHER CHRONIC PAIN: ICD-10-CM

## 2024-08-05 DIAGNOSIS — M54.16 CHRONIC RADICULAR LOW BACK PAIN: ICD-10-CM

## 2024-08-05 DIAGNOSIS — M54.16 RADICULOPATHY, LUMBAR REGION: ICD-10-CM

## 2024-08-05 DIAGNOSIS — G89.29 CHRONIC RADICULAR LOW BACK PAIN: ICD-10-CM

## 2024-08-05 DIAGNOSIS — M54.50 LUMBAR PAIN: ICD-10-CM

## 2024-08-05 DIAGNOSIS — M54.9 UPPER BACK PAIN: Primary | ICD-10-CM

## 2024-08-05 RX ORDER — OXYCODONE AND ACETAMINOPHEN 5; 325 MG/1; MG/1
1-2 TABLET ORAL EVERY 8 HOURS PRN
Qty: 75 TABLET | Refills: 0 | Status: SHIPPED | OUTPATIENT
Start: 2024-08-05

## 2024-08-05 RX ORDER — CYCLOBENZAPRINE HCL 10 MG
TABLET ORAL
Qty: 30 TABLET | Refills: 2 | Status: SHIPPED | OUTPATIENT
Start: 2024-08-05

## 2024-08-05 RX ORDER — TRAMADOL HYDROCHLORIDE 50 MG/1
50 TABLET ORAL 2 TIMES DAILY PRN
Qty: 60 TABLET | Refills: 0 | Status: SHIPPED | OUTPATIENT
Start: 2024-08-05 | End: 2024-09-04

## 2024-08-05 RX ORDER — MELOXICAM 15 MG/1
15 TABLET ORAL DAILY
Qty: 30 TABLET | Refills: 2 | Status: SHIPPED | OUTPATIENT
Start: 2024-08-05

## 2024-09-05 DIAGNOSIS — M54.9 UPPER BACK PAIN: ICD-10-CM

## 2024-09-05 DIAGNOSIS — G89.29 CHRONIC RADICULAR LOW BACK PAIN: ICD-10-CM

## 2024-09-05 DIAGNOSIS — M54.16 CHRONIC RADICULAR LOW BACK PAIN: ICD-10-CM

## 2024-09-05 DIAGNOSIS — M54.50 LUMBAR PAIN: ICD-10-CM

## 2024-09-05 DIAGNOSIS — R79.89 LOW TESTOSTERONE IN MALE: ICD-10-CM

## 2024-09-05 RX ORDER — TRAMADOL HYDROCHLORIDE 50 MG/1
50 TABLET ORAL 2 TIMES DAILY PRN
Qty: 60 TABLET | Refills: 0 | Status: CANCELLED | OUTPATIENT
Start: 2024-09-05 | End: 2024-10-05

## 2024-09-05 RX ORDER — TRAMADOL HYDROCHLORIDE 50 MG/1
50 TABLET ORAL 2 TIMES DAILY PRN
Qty: 60 TABLET | Refills: 0 | Status: SHIPPED | OUTPATIENT
Start: 2024-09-05 | End: 2024-10-05

## 2024-09-05 RX ORDER — TESTOSTERONE 20.25 MG/1.25G
GEL TOPICAL
Qty: 75 G | Refills: 0 | Status: SHIPPED | OUTPATIENT
Start: 2024-09-05

## 2024-09-05 RX ORDER — OXYCODONE AND ACETAMINOPHEN 5; 325 MG/1; MG/1
1-2 TABLET ORAL EVERY 8 HOURS PRN
Qty: 75 TABLET | Refills: 0 | Status: SHIPPED | OUTPATIENT
Start: 2024-09-05

## 2024-09-05 RX ORDER — TESTOSTERONE 20.25 MG/1.25G
GEL TOPICAL
Qty: 75 G | Refills: 0 | Status: SHIPPED | OUTPATIENT
Start: 2024-09-05 | End: 2024-09-05 | Stop reason: SDUPTHER

## 2024-09-05 RX ORDER — OXYCODONE AND ACETAMINOPHEN 5; 325 MG/1; MG/1
1-2 TABLET ORAL EVERY 8 HOURS PRN
Qty: 75 TABLET | Refills: 0 | Status: CANCELLED | OUTPATIENT
Start: 2024-09-05

## 2024-09-05 NOTE — TELEPHONE ENCOUNTER
Last seen 4/19/24  Medication pended  Uds contract 7/3/24  Scheduled appt on 9/13/24    Mikey SPARROW Do [id number] [facility] Clinical Support Staff (supporting Luis Miguel Jasmine DO)3 hours ago (11:19 AM)       Good morning  It is that time of the month again. Can I please get my Tramadol refilled. In a few more days, the Percocet follows. I think I am good on the others

## 2024-09-12 NOTE — PROGRESS NOTES
Subjective   Patient ID: Mikey Jean is a 58 y.o. male who presents for Annual Exam and Pain.    HPI    Annual physical   Eats a generally healthy diet   Staying active   Denies any chest pain,SOB  No Abdominal pain   No black or bloody stools   Urination/BM normal   Last eye apt 8/2023  Last dental apt 3 week ago  No new family h/o cancers or heart disease     Patient presents today for a follow-up on Back Pain. Is taking Percocet 5-325 MG, and Tramadol 50 MG. States he has no concerns with these medications. Rates the pain a 4/10 over the past 7 days. Reports that the medication gives 50 % pain control/relief. [place]AR[place] reviewed today. Controlled substance contract signed today. UDS 7/3/24.     Pt is already scheduled for Flu vaccine     He is due for blood work with another physician in October.  He has an upcoming appointment with rheumatologist.    He is taking Taltz and has nasal drainage from this.  He is using nasal drops, he believe it is Afrin, daily.   He states this helps more than the Singulair did.   Denies coughing.  Denies acid reflux.   Denies sour taste.     He does feel better since starting Testosterone.   He has gained weight.     No other concern /question     Review of systems  ; Patient seen today for exam denies any problems with headaches or vision, denies any shortness of breath chest pain nausea or vomiting, no black stool no blood in the stool no heartburn type symptoms denies any problems with constipation or diarrhea, and no dysuria-type symptoms    The patient's allergies medications were reviewed with them today    The patient's social family and surgical history or also reviewed here today, along with her past medical history.     Objective     Alert and active in  no acute distress  HEENT TMs clear oropharynx negative nares clear no drainage noted neck supple  With no adenopathy   Heart regular rate and rhythm without murmur and no carotid bruits  Lungs- clear to auscultation  "bilaterally, no wheeze or rhonchi noted  Thyroid -negative masses or nodularity  Abdomen- soft times four quadrants , bowel sounds positive no masses or organomegaly, negative tenderness guarding or rebound  Neurological exam unremarkable- DTRs in upper and lower extremities within normal limits.   skin -no lesions noted      /64 (BP Location: Left arm, Patient Position: Sitting, BP Cuff Size: Adult)   Pulse 101   Temp 36.1 °C (97 °F) (Temporal)   Resp 16   Ht 1.778 m (5' 10\")   Wt 84.8 kg (187 lb)   SpO2 96%   BMI 26.83 kg/m²     No Known Allergies    Assessment/Plan   Problem List Items Addressed This Visit       Lumbar pain    Vitamin D deficiency    Vitamin B12 deficiency    Upper back pain    Ankylosing spondylitis of unspecified sites in spine (Multi)     Other Visit Diagnoses       Routine general medical examination at a health care facility    -  Primary    Other chronic pain        Nasal drainage              Atrovent prescribed today.   Recommend he stop using the Afrin daily and use Atrovent.   He can use Afrin when he flies or for just a few days and then stop.     He can also take Zyrtec for sinus drainage.     Increase Testosterone to 3 pumps.     We will check labs in December.    If anything worsens or changes please call us at once, follow up in the office as planned.    Scribe Attestation  By signing my name below, I, Rosa Kendall MA, Scribe   attest that this documentation has been prepared under the direction and in the presence of Luis Miguel Jasmine DO.      "

## 2024-09-13 ENCOUNTER — APPOINTMENT (OUTPATIENT)
Dept: PRIMARY CARE | Facility: CLINIC | Age: 59
End: 2024-09-13
Payer: COMMERCIAL

## 2024-09-13 VITALS
HEIGHT: 70 IN | WEIGHT: 187 LBS | BODY MASS INDEX: 26.77 KG/M2 | SYSTOLIC BLOOD PRESSURE: 108 MMHG | OXYGEN SATURATION: 96 % | RESPIRATION RATE: 16 BRPM | DIASTOLIC BLOOD PRESSURE: 64 MMHG | TEMPERATURE: 97 F | HEART RATE: 101 BPM

## 2024-09-13 DIAGNOSIS — E53.8 VITAMIN B12 DEFICIENCY: ICD-10-CM

## 2024-09-13 DIAGNOSIS — Z00.00 ROUTINE GENERAL MEDICAL EXAMINATION AT A HEALTH CARE FACILITY: Primary | ICD-10-CM

## 2024-09-13 DIAGNOSIS — E55.9 VITAMIN D DEFICIENCY: ICD-10-CM

## 2024-09-13 DIAGNOSIS — M54.9 UPPER BACK PAIN: ICD-10-CM

## 2024-09-13 DIAGNOSIS — M45.9 ANKYLOSING SPONDYLITIS OF UNSPECIFIED SITES IN SPINE (MULTI): ICD-10-CM

## 2024-09-13 DIAGNOSIS — G89.29 OTHER CHRONIC PAIN: ICD-10-CM

## 2024-09-13 DIAGNOSIS — M54.50 LUMBAR PAIN: ICD-10-CM

## 2024-09-13 DIAGNOSIS — J34.89 NASAL DRAINAGE: ICD-10-CM

## 2024-09-13 PROCEDURE — 3008F BODY MASS INDEX DOCD: CPT | Performed by: FAMILY MEDICINE

## 2024-09-13 PROCEDURE — 1036F TOBACCO NON-USER: CPT | Performed by: FAMILY MEDICINE

## 2024-09-13 PROCEDURE — 99396 PREV VISIT EST AGE 40-64: CPT | Performed by: FAMILY MEDICINE

## 2024-09-13 RX ORDER — METHOTREXATE 2.5 MG/1
15 TABLET ORAL WEEKLY
COMMUNITY
Start: 2024-07-23

## 2024-09-13 RX ORDER — IPRATROPIUM BROMIDE 21 UG/1
2 SPRAY, METERED NASAL EVERY 12 HOURS
Qty: 30 ML | Refills: 12 | Status: SHIPPED | OUTPATIENT
Start: 2024-09-13 | End: 2025-09-13

## 2024-09-13 RX ORDER — FOLIC ACID 1 MG/1
TABLET ORAL DAILY
COMMUNITY

## 2024-10-07 DIAGNOSIS — M54.50 LUMBAR PAIN: ICD-10-CM

## 2024-10-07 DIAGNOSIS — G62.9 NEUROPATHY: ICD-10-CM

## 2024-10-07 DIAGNOSIS — M54.16 CHRONIC RADICULAR LOW BACK PAIN: ICD-10-CM

## 2024-10-07 DIAGNOSIS — M54.9 UPPER BACK PAIN: ICD-10-CM

## 2024-10-07 DIAGNOSIS — G89.29 CHRONIC RADICULAR LOW BACK PAIN: ICD-10-CM

## 2024-10-07 DIAGNOSIS — J34.89 NASAL DRAINAGE: ICD-10-CM

## 2024-10-07 RX ORDER — IPRATROPIUM BROMIDE 21 UG/1
2 SPRAY, METERED NASAL EVERY 12 HOURS
Qty: 30 ML | Refills: 5 | Status: SHIPPED | OUTPATIENT
Start: 2024-10-07 | End: 2025-10-07

## 2024-10-07 RX ORDER — OXYCODONE AND ACETAMINOPHEN 5; 325 MG/1; MG/1
1-2 TABLET ORAL EVERY 8 HOURS PRN
Qty: 75 TABLET | Refills: 0 | Status: SHIPPED | OUTPATIENT
Start: 2024-10-07

## 2024-10-07 RX ORDER — TRAMADOL HYDROCHLORIDE 50 MG/1
50 TABLET ORAL 2 TIMES DAILY PRN
Qty: 60 TABLET | Refills: 0 | Status: SHIPPED | OUTPATIENT
Start: 2024-10-07 | End: 2024-11-06

## 2024-10-07 RX ORDER — GABAPENTIN 300 MG/1
CAPSULE ORAL
Qty: 150 CAPSULE | Refills: 1 | Status: SHIPPED | OUTPATIENT
Start: 2024-10-07

## 2024-10-07 NOTE — TELEPHONE ENCOUNTER
Good morning. I need both my tramadol and my gabapentin   Shortly after that is the Percocet please. Thank you   Mikey

## 2024-10-23 ENCOUNTER — APPOINTMENT (OUTPATIENT)
Dept: CARDIOLOGY | Facility: CLINIC | Age: 59
End: 2024-10-23
Payer: COMMERCIAL

## 2024-10-23 VITALS
HEART RATE: 85 BPM | BODY MASS INDEX: 26.04 KG/M2 | SYSTOLIC BLOOD PRESSURE: 127 MMHG | DIASTOLIC BLOOD PRESSURE: 83 MMHG | HEIGHT: 71 IN | WEIGHT: 186 LBS

## 2024-10-23 DIAGNOSIS — E78.5 DYSLIPIDEMIA: ICD-10-CM

## 2024-10-23 DIAGNOSIS — R93.1 AGATSTON CAC SCORE, >400: ICD-10-CM

## 2024-10-23 DIAGNOSIS — I25.10 ATHEROSCLEROSIS OF NATIVE CORONARY ARTERY OF NATIVE HEART WITHOUT ANGINA PECTORIS: Primary | ICD-10-CM

## 2024-10-23 PROCEDURE — 3008F BODY MASS INDEX DOCD: CPT | Performed by: INTERNAL MEDICINE

## 2024-10-23 PROCEDURE — 99213 OFFICE O/P EST LOW 20 MIN: CPT | Performed by: INTERNAL MEDICINE

## 2024-10-23 RX ORDER — HYDROXYCHLOROQUINE SULFATE 200 MG/1
200 TABLET, FILM COATED ORAL 2 TIMES DAILY
COMMUNITY
Start: 2024-10-22

## 2024-10-23 NOTE — PROGRESS NOTES
Patient:  Mikey Jean  YOB: 1965  MRN: [id number]       HPI:       Mikey Jean is a 59 y.o. male who returns today for cardiac follow-up.  He was seen on August 29, 2023 for evaluation of chest discomfort.  He has atherosclerotic heart disease with previous CT coronary calcium score August 5, 2021 of 669.  He does not have any history of angina pectoris, myocardial infarction, or angioplasty procedures. No history of hypertension, diabetes mellitus, or tobacco use. He notes that his brother had a myocardial infarction at the age of 57.   Mikey has been intolerant to atorvastatin, pravastatin, and pitavastatin.       He has a history of chronic back pain. He is currently maintained on gabapentin, tramadol, methocarbamol, and oxycodone. He has both cervical and lumbar disc disease. He has chronic pain related to cervical disc disease and he feels this makes it difficult to sort out if he is having any heart issues.  At the time of his initial visit he described at least 3 different types of chest discomfort. He noted some sharp pains on the left side of his chest that worse with movement. He also noted some intermittent aching and pressure-like discomfort in the upper chest.  He reported some sharp pains on the right side of his chest worsened with deep inspiration. He was having discomfort both at rest and with exertional activity.      His EKG showed normal sinus rhythm with incomplete right bundle branch block.  Lab studies dated August 22, 2023 showed a normal CBC and comprehensive metabolic profile. TSH was normal. Coronary CT angiogram on September 15, 2023 showed mild diffuse disease without significant stenosis.  Coronary CT calcium score 932 (97% CABRALES risk).     He has been doing well since his last visit.  He is seeing a rheumatologist on a regular basis.  He is on Taltz therapy.  He also takes Plaquenil and methotrexate.  He has been able to golf more regularly.  He denies any current chest  pain shortness of breath. He denies any orthopnea, PND, or increasing peripheral edema. He denies any palpitations, lightheadedness, near-syncope, or syncope. He denies any fever, chills, or cough. He denies any nausea, vomiting, or diaphoresis. He denies any hemoptysis, hematemesis, melena, or hematochezia.      As noted, he has extensive coronary calcification but no evidence of flow-limiting blockages.  An initial attempt to get Repatha covered by his insurance company was denied.  He was started on low-dose rosuvastatin 10 mg daily.  He has been tolerating this quite well with only occasional cramping in his left calf at night.  If he becomes intolerant to this we will make an additional attempt to get Repatha covered.  Alternatives would be Praluent or Leqvio.  Lab studies dated December 22, 2023 showed a normal CBC and CMP.  Cholesterol 167 with HDL 71, LDL 59, and triglycerides 184.  C-reactive protein 0.14 and ESR 5.   Other details as noted below.     The above portion of this note was dictated by me using voice recognition software. I personally performed the services described in the documentation. The scribe entering the documentation below was in my presence. I affirm that the information is both accurate and complete.      Objective:     Vitals:    10/23/24 1357   BP: 127/83   Pulse: 85       Wt Readings from Last 4 Encounters:   09/13/24 84.8 kg (187 lb)   04/19/24 81.6 kg (180 lb)   03/06/24 84.8 kg (187 lb)   01/12/24 83 kg (183 lb)       Allergies:     No Known Allergies       Medications:     Current Outpatient Medications   Medication Instructions    cholecalciferol (VITAMIN D-3) 25 mcg, oral, Daily, Take as directed    cyclobenzaprine (Flexeril) 10 mg tablet TAKE 1/2-1 TABLET (5-10 MG) BY MOUTH AS NEEDED AT BEDTIME FOR MUSCLE SPASMS    DULoxetine (CYMBALTA) 60 mg, oral, Daily, Do not crush or chew.    folic acid (Folvite) 1 mg tablet oral, Daily    gabapentin (Neurontin) 300 mg capsule 1 CAPSULE  IN AM, 1 CAPSULE IN PM AND 3 CAPSULES AT NIGHT    ipratropium (Atrovent) 21 mcg (0.03 %) nasal spray 2 sprays, Each Nostril, Every 12 hours    MAGNESIUM GLYCINATE ORAL oral    meloxicam (MOBIC) 15 mg, oral, Daily    methotrexate (TREXALL) 15 mg, oral, Weekly    multivitamin (Multiple Vitamins) tablet 1 tablet, oral, Daily    oxyCODONE-acetaminophen (Percocet) 5-325 mg tablet 1-2 tablets, oral, Every 8 hours PRN    pantoprazole (PROTONIX) 40 mg, oral, Daily, DO NOT CRUSH CHEW OR SPLIT    rosuvastatin (CRESTOR) 10 mg, oral, Daily    Taltz Autoinjector 80 mg, subcutaneous    testosterone 20.25 mg/1.25 gram (1.62 %) gel in metered-dose pump Place 1 pump on each shoulder daily for total of 2 pumps per day    traMADol (ULTRAM) 50 mg, oral, 2 times daily PRN       Physical Examination:   GENERAL:  Well developed, well nourished, in no acute distress.  CHEST:  Symmetric and nontender.  NEURO/PSYCH:  Alert and oriented times three with approppriate behavior and responses.  NECK:  Supple, no JVD, no bruit.  LUNGS:  Clear to auscultation bilaterally, normal respiratory effort.  HEART:  Rate and rhythm regular with no evident murmur, no gallop appreciated.        There are no rubs, clicks or heaves.  EXTREMITIES:  Warm with good color, no clubbing or cyanosis.  There is no edema noted.  PERIPHERAL VASCULAR:  Pulses present and equally palpable; 2+ throughout.      Lab:     CBC:   Lab Results   Component Value Date    WBC 7.6 12/22/2023    RBC 4.35 (L) 12/22/2023    HGB 14.1 12/22/2023    HCT 43.6 12/22/2023     12/22/2023        CMP:    Lab Results   Component Value Date     12/22/2023    K 4.7 12/22/2023     12/22/2023    CO2 30 12/22/2023    BUN 19 12/22/2023    CREATININE 0.87 12/22/2023    GLUCOSE 96 12/22/2023    CALCIUM 9.0 12/22/2023       Lipid Profile:    Lab Results   Component Value Date    TRIG 184 (H) 12/22/2023    HDL 71.2 12/22/2023    LDLCALC 59 12/22/2023       BMP:  Lab Results   Component  Value Date     12/22/2023     08/22/2023     12/05/2022     09/27/2022    K 4.7 12/22/2023    K 4.5 08/22/2023    K 3.8 12/05/2022    K 4.1 09/27/2022     12/22/2023    CL 99 08/22/2023    CL 97 (L) 12/05/2022     09/27/2022    CO2 30 12/22/2023    CO2 31 08/22/2023    CO2 30 12/05/2022    CO2 26 09/27/2022    BUN 19 12/22/2023    BUN 18 08/22/2023    BUN 13 12/05/2022    BUN 15 09/27/2022    CREATININE 0.87 12/22/2023    CREATININE 0.94 08/22/2023    CREATININE 0.86 12/05/2022    CREATININE 0.68 09/27/2022       CBC:  Lab Results   Component Value Date    WBC 7.6 12/22/2023    WBC 8.1 08/22/2023    WBC 9.8 12/05/2022    WBC 11.3 09/27/2022    RBC 4.35 (L) 12/22/2023    RBC 4.51 08/22/2023    RBC 4.47 (L) 12/05/2022    RBC 4.43 (L) 09/27/2022    HGB 14.1 12/22/2023    HGB 14.6 08/22/2023    HGB 14.1 12/05/2022    HGB 14.3 09/27/2022    HCT 43.6 12/22/2023    HCT 46.0 08/22/2023    HCT 44.4 12/05/2022    HCT 44.4 09/27/2022     12/22/2023     (H) 08/22/2023    MCV 99 12/05/2022     09/27/2022    MCH 32.4 12/22/2023    MCHC 32.3 12/22/2023    MCHC 31.7 (L) 08/22/2023    MCHC 31.8 (L) 12/05/2022    MCHC 32.2 09/27/2022    RDW 12.9 12/22/2023    RDW 14.4 08/22/2023    RDW 13.0 12/05/2022    RDW 14.3 09/27/2022     12/22/2023     08/22/2023     12/05/2022     09/27/2022       Hepatic Function Panel:    Lab Results   Component Value Date    ALKPHOS 60 12/22/2023    ALT 27 12/22/2023    AST 33 12/22/2023    PROT 6.3 (L) 12/22/2023    BILITOT 0.6 12/22/2023       HgBA1c:    Lab Results   Component Value Date    HGBA1C 5.0 07/17/2020       Magnesium:    Lab Results   Component Value Date    MG 2.47 (H) 08/22/2023       TSH:    Lab Results   Component Value Date    TSH 2.48 08/22/2023       PT/INR:    Lab Results   Component Value Date    PROTIME 10.4 09/21/2022    INR 0.9 09/21/2022       Cardiac Enzymes:    Lab Results   Component Value  Date    TROPHS 11 09/21/2022         Diagnostic Studies:     XR chest 2 views  Result Date: 6/12/2024    1.  No evidence of acute cardiopulmonary process.     Signed by: Tay Sanchez 6/12/2024 8:42 AM Dictation workstation:   [id number]    CT sinuss wo IV contrast volumetric surgical planning  Result Date: 6/12/2024  Mild-to-moderate opacification throughout the paranasal sinuses with air-fluid levels which may represent acute sinusitis in the appropriate clinical setting.   I personally reviewed the images/study and I agree with the findings as stated by Dr. Wilson. This study was interpreted at [facility], [place], [place].   MACRO: None   Signed by: Lulu Wade 6/12/2024 7:55 AM       Problem List:     Patient Active Problem List   Diagnosis    Anxiety disorder    Cervical radiculopathy, chronic    Chronic radicular low back pain    Cubital tunnel syndrome on left    Dyslipidemia    Deformity of hand, right    Degenerative scoliosis in adult patient    Erythema    Finger pain    Hand pain    Fusion of joint    Hypoglycemia    Insomnia    Lumbar pain    Lumbar postlaminectomy syndrome    Myalgia    Neuropathy    Osteoarthritis of finger of left hand    Thumb pain    Shoulder pain    Vitamin D deficiency    Vitamin B12 deficiency    Trigger finger of left thumb    Trigger finger    Upper back pain    Cervical spondylosis without myelopathy    Degenerative disc disease, cervical    Displacement of lumbar intervertebral disc without myelopathy    Dizzy spells    Erectile dysfunction due to arterial insufficiency    Lumbar spondylosis    Lumbosacral spondylosis without myelopathy    BMI 26.0-26.9,adult    Urinary frequency    Colon cancer screening    Atherosclerosis of native coronary artery of native heart without angina pectoris    Statin intolerance    Agatston CAC score, >400    Ankylosing spondylitis of unspecified sites in spine (Multi)       Asessment:     Problem  List Items Addressed This Visit             ICD-10-CM    Dyslipidemia E78.5    Relevant Orders    Follow Up In Cardiology    Atherosclerosis of native coronary artery of native heart without angina pectoris - Primary I25.10    Agatston CAC score, >400 R93.1

## 2024-11-02 DIAGNOSIS — G89.29 OTHER CHRONIC PAIN: ICD-10-CM

## 2024-11-02 DIAGNOSIS — M79.10 MYALGIA: ICD-10-CM

## 2024-11-02 DIAGNOSIS — M54.16 RADICULOPATHY, LUMBAR REGION: ICD-10-CM

## 2024-11-04 RX ORDER — MELOXICAM 15 MG/1
15 TABLET ORAL DAILY
Qty: 30 TABLET | Refills: 3 | Status: SHIPPED | OUTPATIENT
Start: 2024-11-04

## 2024-11-04 RX ORDER — DULOXETIN HYDROCHLORIDE 60 MG/1
60 CAPSULE, DELAYED RELEASE ORAL DAILY
Qty: 30 CAPSULE | Refills: 3 | Status: SHIPPED | OUTPATIENT
Start: 2024-11-04

## 2024-11-05 DIAGNOSIS — M54.9 UPPER BACK PAIN: ICD-10-CM

## 2024-11-05 DIAGNOSIS — M54.16 CHRONIC RADICULAR LOW BACK PAIN: ICD-10-CM

## 2024-11-05 DIAGNOSIS — G89.29 CHRONIC RADICULAR LOW BACK PAIN: ICD-10-CM

## 2024-11-05 DIAGNOSIS — M54.50 LUMBAR PAIN: ICD-10-CM

## 2024-11-05 RX ORDER — OXYCODONE AND ACETAMINOPHEN 5; 325 MG/1; MG/1
1-2 TABLET ORAL EVERY 8 HOURS PRN
Qty: 75 TABLET | Refills: 0 | Status: SHIPPED | OUTPATIENT
Start: 2024-11-05

## 2024-11-05 RX ORDER — TRAMADOL HYDROCHLORIDE 50 MG/1
50 TABLET ORAL 2 TIMES DAILY PRN
Qty: 60 TABLET | Refills: 0 | Status: SHIPPED | OUTPATIENT
Start: 2024-11-05 | End: 2024-12-05

## 2024-11-05 NOTE — TELEPHONE ENCOUNTER
Last seen 9/13/24  Medication pended  UDS CONTRACT 7/3/24    Mikey SPARROW Do [id number] [facility] Clinical Support Staff (supporting Luis Miguel Jasmine DO)Just now (10:06 AM)       Yes please. The Tramadol is due and then a day or so after, my Percocet     Thank you

## 2024-11-12 DIAGNOSIS — R79.89 LOW TESTOSTERONE IN MALE: ICD-10-CM

## 2024-11-13 RX ORDER — TESTOSTERONE 20.25 MG/1.25G
GEL TOPICAL
Qty: 75 G | Refills: 0 | Status: SHIPPED | OUTPATIENT
Start: 2024-11-13

## 2024-11-20 PROBLEM — G89.29 OTHER CHRONIC PAIN: Status: ACTIVE | Noted: 2024-11-20

## 2024-11-20 PROBLEM — R79.89 LOW TESTOSTERONE IN MALE: Status: ACTIVE | Noted: 2024-11-20

## 2024-11-20 PROBLEM — R53.81 MALAISE AND FATIGUE: Status: ACTIVE | Noted: 2024-11-20

## 2024-11-20 PROBLEM — R53.83 MALAISE AND FATIGUE: Status: ACTIVE | Noted: 2024-11-20

## 2024-11-21 ENCOUNTER — APPOINTMENT (OUTPATIENT)
Dept: PRIMARY CARE | Facility: CLINIC | Age: 59
End: 2024-11-21
Payer: COMMERCIAL

## 2024-11-21 VITALS
SYSTOLIC BLOOD PRESSURE: 110 MMHG | TEMPERATURE: 97.5 F | BODY MASS INDEX: 26.32 KG/M2 | RESPIRATION RATE: 16 BRPM | HEIGHT: 71 IN | HEART RATE: 100 BPM | DIASTOLIC BLOOD PRESSURE: 68 MMHG | WEIGHT: 188 LBS | OXYGEN SATURATION: 95 %

## 2024-11-21 DIAGNOSIS — M54.9 RIGHT-SIDED BACK PAIN, UNSPECIFIED BACK LOCATION, UNSPECIFIED CHRONICITY: ICD-10-CM

## 2024-11-21 DIAGNOSIS — R53.83 MALAISE AND FATIGUE: ICD-10-CM

## 2024-11-21 DIAGNOSIS — J01.90 ACUTE SINUSITIS, RECURRENCE NOT SPECIFIED, UNSPECIFIED LOCATION: ICD-10-CM

## 2024-11-21 DIAGNOSIS — G47.33 OBSTRUCTIVE SLEEP APNEA (ADULT) (PEDIATRIC): ICD-10-CM

## 2024-11-21 DIAGNOSIS — E53.8 VITAMIN B12 DEFICIENCY: ICD-10-CM

## 2024-11-21 DIAGNOSIS — E78.5 DYSLIPIDEMIA: ICD-10-CM

## 2024-11-21 DIAGNOSIS — R53.81 MALAISE AND FATIGUE: ICD-10-CM

## 2024-11-21 DIAGNOSIS — E55.9 VITAMIN D DEFICIENCY: ICD-10-CM

## 2024-11-21 DIAGNOSIS — R79.89 LOW TESTOSTERONE IN MALE: ICD-10-CM

## 2024-11-21 DIAGNOSIS — G89.29 OTHER CHRONIC PAIN: ICD-10-CM

## 2024-11-21 DIAGNOSIS — Z12.5 PROSTATE CANCER SCREENING: ICD-10-CM

## 2024-11-21 LAB
POC APPEARANCE, URINE: CLEAR
POC BILIRUBIN, URINE: NEGATIVE
POC BLOOD, URINE: NEGATIVE
POC COLOR, URINE: YELLOW
POC GLUCOSE, URINE: NEGATIVE MG/DL
POC KETONES, URINE: NEGATIVE MG/DL
POC LEUKOCYTES, URINE: NEGATIVE
POC NITRITE,URINE: NEGATIVE
POC PH, URINE: 6.5 PH
POC PROTEIN, URINE: NEGATIVE MG/DL
POC SPECIFIC GRAVITY, URINE: >=1.03
POC UROBILINOGEN, URINE: 0.2 EU/DL

## 2024-11-21 PROCEDURE — 3008F BODY MASS INDEX DOCD: CPT | Performed by: FAMILY MEDICINE

## 2024-11-21 PROCEDURE — 81003 URINALYSIS AUTO W/O SCOPE: CPT | Performed by: FAMILY MEDICINE

## 2024-11-21 PROCEDURE — 99215 OFFICE O/P EST HI 40 MIN: CPT | Performed by: FAMILY MEDICINE

## 2024-11-21 RX ORDER — METHYLPREDNISOLONE 4 MG/1
TABLET ORAL
Qty: 21 TABLET | Refills: 0 | Status: SHIPPED | OUTPATIENT
Start: 2024-11-21 | End: 2024-11-28

## 2024-11-21 RX ORDER — AZITHROMYCIN 250 MG/1
TABLET, FILM COATED ORAL
Qty: 6 TABLET | Refills: 0 | Status: SHIPPED | OUTPATIENT
Start: 2024-11-21 | End: 2024-11-25

## 2024-11-21 NOTE — PROGRESS NOTES
Subjective   Patient ID: Mikey Jean is a 59 y.o. male who presents for Pain, shoulder numbness, and Back Pain.    HPI    Pt is being seen for Medication changes for another Provider   Pt is now  taking  hydroxychloroquine (Plaquenil) 200 mg tablet for his RA.  Pt states that he flushed at time and face more red after he takes the medication.   He states he has not seen a difference with the plaquenil yet.     Pt is also numbness in left shoulder going up to the neck into the left ear  He did send a note to his neurosurgeon as well.   He states this occurs randomly.   Denies the numbness down the arm.   Has not had a massage recently.     Discuss reoccurring back pain on the lower right side radiating around to right lower  Abdominal area x 1 month      He states the pain does subside sometimes.  Has not noticed food making a difference.     States he is going out of town next week.     He did just restart Crestor.   States he was told to hold for a couple weeks to see if cramps improved.     He has been sweating profusely at night.   Denies SOB.   Does not wake up tired as much.  Does not fall asleep driving.   He does snore.       Reviewed his sleep study which while I was on vacation was not addressed    Due to supine sleeping medications he does have moderate obstruction especially with supine position      Would recommend a titration study to be complete at this clinic a big difference with his overall feeling of wellness fatigue which will help his muscles I think    Flu 9/24/24      No other concern /question   Review of systems  ; Patient seen today for exam denies any problems with headaches or vision, denies any shortness of breath chest pain nausea or vomiting, no black stool no blood in the stool no heartburn type symptoms denies any problems with constipation or diarrhea, and no dysuria-type symptoms    The patient's allergies medications were reviewed with them today    The patient's social family and  "surgical history or also reviewed here today, along with her past medical history.     Objective     Alert and active in  no acute distress  HEENT TMs clear oropharynx negative nares clear no drainage noted neck supple  With no adenopathy   Heart regular rate and rhythm without murmur and no carotid bruits  Lungs- clear to auscultation bilaterally, no wheeze or rhonchi noted  Thyroid -negative masses or nodularity  Abdomen- soft times four quadrants , bowel sounds positive no masses or organomegaly, negative tenderness guarding or rebound  Neurological exam unremarkable- DTRs in upper and lower extremities within normal limits.   skin -no lesions noted      /68 (BP Location: Left arm, Patient Position: Sitting, BP Cuff Size: Adult)   Pulse 100   Temp 36.4 °C (97.5 °F) (Temporal)   Resp 16   Ht 1.803 m (5' 11\")   Wt 85.3 kg (188 lb)   SpO2 95%   BMI 26.22 kg/m²     No Known Allergies    Assessment/Plan   Problem List Items Addressed This Visit       Dyslipidemia    Relevant Orders    Lipid Panel    Vitamin D deficiency    Relevant Orders    Vitamin D 25-Hydroxy,Total (for eval of Vitamin D levels)    Vitamin B12 deficiency    Relevant Orders    Vitamin B12    BMI 26.0-26.9,adult    Prostate cancer screening    Relevant Orders    Prostate Specific Antigen, Screen    Low testosterone in male    Relevant Orders    Testosterone    Other chronic pain    Malaise and fatigue    Relevant Orders    Comprehensive Metabolic Panel    CBC and Auto Differential     Other Visit Diagnoses       Right-sided back pain, unspecified back location, unspecified chronicity        Relevant Orders    POCT UA Automated manually resulted (Completed)    Acute sinusitis, recurrence not specified, unspecified location        Relevant Medications    azithromycin (Zithromax) 250 mg tablet    methylPREDNISolone (Medrol Dospak) 4 mg tablets    Obstructive sleep apnea (adult) (pediatric)        Relevant Orders    In-Center Sleep Study " (Non-Sleep Provider Only)          Do not drink prior to blood tests.     Recommend CoQ10 400 mg.     Recommend having an alcoholic beverage and then a water.   Increase fluid intake.     Discussed signs and symptoms of a stroke.     Discussed proper computer positioning and TV positioning.   Use heat or ice, whichever feels better.     He could go back to Dr. Hickman.     Reviewed sleep study with him today.   Do not drink alcohol at bedtime.   We could do a trial of a Cpap machine.   Titration sleep study ordered.    Labs have been ordered, she/he will have these performed and we will contact her/him with results.  (CBC, CMP, Lipid, PSA, Testosterone, Vitamin B12, Vitamin D)    If anything worsens or changes please call us at once, follow up in the office as planned.    Scribe Attestation  By signing my name below, I, Rosa Kendall MA, Scribe   attest that this documentation has been prepared under the direction and in the presence of Luis Miguel Jasmine DO.

## 2024-12-09 DIAGNOSIS — G89.29 CHRONIC RADICULAR LOW BACK PAIN: ICD-10-CM

## 2024-12-09 DIAGNOSIS — R79.89 LOW TESTOSTERONE IN MALE: ICD-10-CM

## 2024-12-09 DIAGNOSIS — M54.16 CHRONIC RADICULAR LOW BACK PAIN: ICD-10-CM

## 2024-12-09 DIAGNOSIS — M54.50 LUMBAR PAIN: ICD-10-CM

## 2024-12-09 DIAGNOSIS — M54.9 UPPER BACK PAIN: ICD-10-CM

## 2024-12-09 RX ORDER — TRAMADOL HYDROCHLORIDE 50 MG/1
50 TABLET ORAL 2 TIMES DAILY PRN
Qty: 60 TABLET | Refills: 0 | Status: SHIPPED | OUTPATIENT
Start: 2024-12-09 | End: 2025-01-08

## 2024-12-09 RX ORDER — OXYCODONE AND ACETAMINOPHEN 5; 325 MG/1; MG/1
1-2 TABLET ORAL EVERY 8 HOURS PRN
Qty: 75 TABLET | Refills: 0 | Status: SHIPPED | OUTPATIENT
Start: 2024-12-09

## 2024-12-09 RX ORDER — TESTOSTERONE 20.25 MG/1.25G
GEL TOPICAL
Qty: 75 G | Refills: 0 | Status: SHIPPED | OUTPATIENT
Start: 2024-12-09

## 2024-12-09 NOTE — TELEPHONE ENCOUNTER
Last seen 11/21/24  Medication pended  UDS CONTRACT 7/3/24    Mikey SPARROW Do [id number] [facility] Clinical Support Staff (supporting Luis Miguel Jasmine DO)2 days ago       Good morning. I meant to send this yesterday. I am back from vacation soon snd need the refills that I spoke of prior to leaving   These are out. Tramadol,Percocet, testosterone   Thank you!   Mikey

## 2024-12-23 ENCOUNTER — CLINICAL SUPPORT (OUTPATIENT)
Dept: SLEEP MEDICINE | Facility: CLINIC | Age: 59
End: 2024-12-23
Payer: COMMERCIAL

## 2024-12-23 VITALS — BODY MASS INDEX: 26.33 KG/M2 | WEIGHT: 188.05 LBS | HEIGHT: 71 IN

## 2024-12-23 DIAGNOSIS — G47.61 PERIODIC LIMB MOVEMENT DISORDER: ICD-10-CM

## 2024-12-23 DIAGNOSIS — G47.33 OBSTRUCTIVE SLEEP APNEA (ADULT) (PEDIATRIC): ICD-10-CM

## 2024-12-23 PROCEDURE — 95811 POLYSOM 6/>YRS CPAP 4/> PARM: CPT | Performed by: PSYCHIATRY & NEUROLOGY

## 2024-12-23 ASSESSMENT — SLEEP AND FATIGUE QUESTIONNAIRES
HOW LIKELY ARE YOU TO NOD OFF OR FALL ASLEEP WHILE WATCHING TV: SLIGHT CHANCE OF DOZING
HOW LIKELY ARE YOU TO NOD OFF OR FALL ASLEEP IN A CAR, WHILE STOPPED FOR A FEW MINUTES IN TRAFFIC: WOULD NEVER DOZE
SITING INACTIVE IN A PUBLIC PLACE LIKE A CLASS ROOM OR A MOVIE THEATER: WOULD NEVER DOZE
HOW LIKELY ARE YOU TO NOD OFF OR FALL ASLEEP WHILE SITTING AND TALKING TO SOMEONE: WOULD NEVER DOZE
HOW LIKELY ARE YOU TO NOD OFF OR FALL ASLEEP WHEN YOU ARE A PASSENGER IN A CAR FOR AN HOUR WITHOUT A BREAK: WOULD NEVER DOZE
HOW LIKELY ARE YOU TO NOD OFF OR FALL ASLEEP WHILE SITTING AND READING: SLIGHT CHANCE OF DOZING
ESS-CHAD TOTAL SCORE: 3
HOW LIKELY ARE YOU TO NOD OFF OR FALL ASLEEP WHILE SITTING QUIETLY AFTER LUNCH WITHOUT ALCOHOL: WOULD NEVER DOZE
HOW LIKELY ARE YOU TO NOD OFF OR FALL ASLEEP WHILE LYING DOWN TO REST IN THE AFTERNOON WHEN CIRCUMSTANCES PERMIT: SLIGHT CHANCE OF DOZING

## 2024-12-24 NOTE — PROGRESS NOTES
[facility] TECH NOTE:     Patient: Mikey Jean   MRN//AGE: [id number]  1965  59 y.o.   Technologist: Katherin Trevino   Room: 1   Service Date: 2024        Sleep Testing Location: [place]    [place]: 3    TECHNOLOGIST SLEEP STUDY PROCEDURE NOTE:   This sleep study is being conducted according to the policies and procedures outlined by the AAS accreditation standards.  The sleep study procedure and processes involved during this appointment was explained to the patient/patient’s family, questions were answered. The patient/family verbalized understanding.      The patient is a 59 y.o. year old male scheduled for a CPAP titration with montage of:  Standard . he arrived for his appointment.      The study that was ultimately completed was a CPAP titration with montage of:  Standard .    The full study Was completed.  Patient questionnaires completed?: yes     Consents signed? yes    Initial Fall Risk Screening:     Mikey has not fallen in the last 6 months. his fall did not result in injury. Mikey does not have a fear of falling. He does not need assistance with sitting, standing, or walking. he does not need assistance walking in his home. he does not need assistance in an unfamiliar setting. The patient is not using an assistive device.     Brief Study observations: The patient arrived for his ordered CPAP titration. The patient had an HST done 24 which showed a 3% AHI of 25.8 with an SpO2 ramiro of 58%. A CPAP trial/mask fitting was performed prior to the hook-up, using a nasal pillows mask. The patient did not seem to have a hard time tolerating the mask or the pressure. The hook-up was done and the purpose of all sensors was explained. CPAP was initiated at 4 cm H2O with heated humidification, using the Respironics Nuance Pro Gel Small Nasal Pillows. A normal sleep latency was seen. Once asleep, no snoring was heard. Few respiratory events were noted. The patient had frequent arousals and periods of WASO.  EPR  was added to try to help the patient maintain sleep. All sleep stages were not seen. REM supine was not captured. The patient was observed sleeping in supine, right side, left side, and prone positions. The patient demonstrated mouth breathing in REM, near the end of the study. He would possibly benefit from a chinstrap. Possible PLMS and leg twitches noted for the duration of the study. NSR was observed throughout the study.    Deviation to order/protocol and reason: N/A      If PAP, which was preferred mask/pressure/mode: Respironics Nuance Pro Gel Small Nasal Pillows, 9 cm H2O     Other: None    After the procedure, the patient/family was informed to ensure followup with ordering clinician for testing results.      Technologist: Katherin Trevino

## 2025-01-03 ENCOUNTER — TELEPHONE (OUTPATIENT)
Dept: PRIMARY CARE | Facility: CLINIC | Age: 60
End: 2025-01-03
Payer: COMMERCIAL

## 2025-01-03 NOTE — TELEPHONE ENCOUNTER
PLEASE ADVISE    Mikey SPARROW Do [id number] [place] [id number] Clinical Support Staff (supporting Luis Miguel Jasmine DO)1 minute ago (11:28 AM)       Good morning  I cannot make heads nor tails from these test results on my sleep study. Can I please get the lay persons'  explanation?     Thank you  Mikey

## 2025-01-04 DIAGNOSIS — G89.29 OTHER CHRONIC PAIN: ICD-10-CM

## 2025-01-04 DIAGNOSIS — M54.16 RADICULOPATHY, LUMBAR REGION: ICD-10-CM

## 2025-01-06 RX ORDER — CYCLOBENZAPRINE HCL 10 MG
TABLET ORAL
Qty: 90 TABLET | Refills: 3 | Status: SHIPPED | OUTPATIENT
Start: 2025-01-06

## 2025-01-06 NOTE — TELEPHONE ENCOUNTER
Luis Miguel Jasmine, DO  Do [id number] [id number] minutes ago (6:21 PM)       Lets please set him up with [facility] for CPAP equipment his sleep study is in there, he did a home sleep study and then a titration split study also,, he did very well with the nasal equipment that he used during the study that is the equipment he wants please make sure Jak does a great job as this is a friend of mine, patient is aware that they will be calling him

## 2025-01-08 DIAGNOSIS — M54.9 UPPER BACK PAIN: ICD-10-CM

## 2025-01-08 DIAGNOSIS — M54.50 LUMBAR PAIN: ICD-10-CM

## 2025-01-08 DIAGNOSIS — G89.29 CHRONIC RADICULAR LOW BACK PAIN: ICD-10-CM

## 2025-01-08 DIAGNOSIS — M54.16 CHRONIC RADICULAR LOW BACK PAIN: ICD-10-CM

## 2025-01-08 RX ORDER — TRAMADOL HYDROCHLORIDE 50 MG/1
50 TABLET ORAL 2 TIMES DAILY PRN
Qty: 60 TABLET | Refills: 0 | Status: SHIPPED | OUTPATIENT
Start: 2025-01-08 | End: 2025-02-07

## 2025-01-08 RX ORDER — OXYCODONE AND ACETAMINOPHEN 5; 325 MG/1; MG/1
1-2 TABLET ORAL EVERY 8 HOURS PRN
Qty: 75 TABLET | Refills: 0 | Status: SHIPPED | OUTPATIENT
Start: 2025-01-08

## 2025-01-08 NOTE — TELEPHONE ENCOUNTER
Last seen 11/21/24  Medication pended  UDS CONTRACT 7/3/24    Mikey SPARROW Do [id number] [facility] Clinical Support Staff (supporting Luis Miguel Jasmine DO)10 hours ago (9:04 PM)       Good morning   Can I please get my tramadol and Percocet refilled this week please.   All else is good. Thank you   Mikey

## 2025-01-08 NOTE — TELEPHONE ENCOUNTER
CPAP order for [facility] on doctor's desk for signature    Lmom for patient to rtc  [phone]  Need to find out if he has a CPAP machine

## 2025-01-12 DIAGNOSIS — R79.89 LOW TESTOSTERONE IN MALE: ICD-10-CM

## 2025-01-13 ENCOUNTER — TELEPHONE (OUTPATIENT)
Dept: PRIMARY CARE | Facility: CLINIC | Age: 60
End: 2025-01-13
Payer: COMMERCIAL

## 2025-01-13 RX ORDER — TESTOSTERONE 20.25 MG/1.25G
GEL TOPICAL
Qty: 75 G | Refills: 1 | Status: SHIPPED | OUTPATIENT
Start: 2025-01-13

## 2025-01-13 NOTE — TELEPHONE ENCOUNTER
Luis Miguel Jasmine, DO  You14 minutes ago (1:24 PM)       Most likely this is coming from his neck,,,, he should try his muscle relaxant at night,, and please give a call to his nerve doctor if it persists as though want to see him

## 2025-01-30 ENCOUNTER — OFFICE VISIT (OUTPATIENT)
Facility: CLINIC | Age: 60
End: 2025-01-30
Payer: COMMERCIAL

## 2025-01-30 VITALS
DIASTOLIC BLOOD PRESSURE: 72 MMHG | BODY MASS INDEX: 26.92 KG/M2 | RESPIRATION RATE: 16 BRPM | SYSTOLIC BLOOD PRESSURE: 118 MMHG | HEART RATE: 102 BPM | HEIGHT: 70 IN | TEMPERATURE: 98 F | WEIGHT: 188 LBS

## 2025-01-30 DIAGNOSIS — M54.12 CERVICAL RADICULOPATHY, CHRONIC: Primary | ICD-10-CM

## 2025-01-30 DIAGNOSIS — Z98.1 STATUS POST CERVICAL SPINAL FUSION: ICD-10-CM

## 2025-01-30 PROCEDURE — 1036F TOBACCO NON-USER: CPT | Performed by: PHYSICIAN ASSISTANT

## 2025-01-30 PROCEDURE — 99213 OFFICE O/P EST LOW 20 MIN: CPT | Performed by: PHYSICIAN ASSISTANT

## 2025-01-30 PROCEDURE — 3008F BODY MASS INDEX DOCD: CPT | Performed by: PHYSICIAN ASSISTANT

## 2025-01-30 ASSESSMENT — PAIN SCALES - GENERAL: PAINLEVEL_OUTOF10: 4

## 2025-01-30 NOTE — PROGRESS NOTES
[facility] [place]  Department of Neurological Surgery  Established Patient Visit    History of Present Illness  Mikey Jean is a 59 y.o. year old male who presents to the spine clinic in follow up with doing gradually worsening left-sided neck pain with radiating symptoms into his neck, head, and face.  He has history of C4-6 ACDF by Dr. Parker years prior and more recent C6-7 ACDF by Dr. Daley.  Prior radicular symptoms are continued improved though the symptoms have been worsening.  He is utilizing methotrexate, hydroxychloroquine, and Meloxicam, Percocet and pain continues.  He denies radiating symptoms past his shoulder into his left or right arm.  Denies any new numbness or weakness in his arms.  Balance instability continue intact.     patient's BMI is Body mass index is 26.98 kg/m².    14/14 systems reviewed and negative other than what is listed in the history of present illness    Patient Active Problem List   Diagnosis    Anxiety disorder    Cervical radiculopathy, chronic    Chronic radicular low back pain    Cubital tunnel syndrome on left    Dyslipidemia    Deformity of hand, right    Degenerative scoliosis in adult patient    Erythema    Finger pain    Hand pain    Fusion of joint    Hypoglycemia    Insomnia    Lumbar pain    Lumbar postlaminectomy syndrome    Myalgia    Neuropathy    Osteoarthritis of finger of left hand    Thumb pain    Shoulder pain    Vitamin D deficiency    Vitamin B12 deficiency    Trigger finger of left thumb    Trigger finger    Upper back pain    Cervical spondylosis without myelopathy    Degenerative disc disease, cervical    Displacement of lumbar intervertebral disc without myelopathy    Dizzy spells    Erectile dysfunction due to arterial insufficiency    Lumbar spondylosis    Lumbosacral spondylosis without myelopathy    BMI 26.0-26.9,adult    Urinary frequency    Prostate cancer screening    Atherosclerosis of native coronary artery of native heart  without angina pectoris    Statin intolerance    Agatston CAC score, >400    Ankylosing spondylitis of unspecified sites in spine (Multi)    Low testosterone in male    Other chronic pain    Malaise and fatigue     Past Medical History:   Diagnosis Date    Ankylosing spondylitis of site in spine (Multi)     Chronic pain disorder     Contact with and (suspected) exposure to covid-19 01/13/2021    Suspected COVID-19 virus infection    Encounter for general adult medical examination without abnormal findings 05/04/2022    Annual physical exam    Joint pain     Low back pain     Lumbosacral disc disease     Neck pain     Osteoarthritis     Peripheral neuropathy     Spinal stenosis      Past Surgical History:   Procedure Laterality Date    BACK SURGERY      CT ANGIO CORONARY ART WITH HEARTFLOW IF SCORE >30%  09/15/2023    CT HEART CORONARY ANGIOGRAM 9/15/2023 [id number] CT    EPIDURAL BLOCK INJECTION      LAMINECTOMY      NECK SURGERY      SPINAL FUSION      SPINE SURGERY      TRIGGER POINT INJECTION       Social History     Tobacco Use    Smoking status: Never    Smokeless tobacco: Never   Substance Use Topics    Alcohol use: Yes     Comment: 10 drinks per week     family history includes Cancer in his mother;  maker in his brother.    Current Outpatient Medications:     cholecalciferol (Vitamin D-3) 25 MCG (1000 UT) capsule, Take 1 capsule (25 mcg) by mouth once daily. Take as directed, Disp: , Rfl:     cyclobenzaprine (Flexeril) 10 mg tablet, TAKE 1/2-1 TABLET (5-10 MG) BY MOUTH AS NEEDED AT BEDTIME FOR MUSCLE SPASMS, Disp: 90 tablet, Rfl: 3    DULoxetine (Cymbalta) 30 mg DR capsule, Take 1 capsule (30 mg) by mouth once daily. Do not crush or chew., Disp: 30 capsule, Rfl: 3    folic acid (Folvite) 1 mg tablet, Take by mouth once daily., Disp: , Rfl:     gabapentin (Neurontin) 300 mg capsule, 1 CAPSULE IN AM, 1 CAPSULE IN PM AND 3 CAPSULES AT NIGHT, Disp: 150 capsule, Rfl: 1    hydroxychloroquine (Plaquenil)  200 mg tablet, Take 1 tablet (200 mg) by mouth 2 times a day., Disp: , Rfl:     ixekizumab (Taltz Autoinjector) 80 mg/mL injection, Inject 1 mL (80 mg) under the skin., Disp: , Rfl:     MAGNESIUM GLYCINATE ORAL, Take by mouth., Disp: , Rfl:     meloxicam (Mobic) 15 mg tablet, TAKE 1 TABLET BY MOUTH EVERY DAY, Disp: 30 tablet, Rfl: 3    methotrexate (Trexall) 2.5 mg tablet, Take 6 tablets (15 mg total) by mouth 1 (one) time per week., Disp: , Rfl:     multivitamin (Multiple Vitamins) tablet, Take 1 tablet by mouth once daily., Disp: , Rfl:     oxyCODONE-acetaminophen (Percocet) 5-325 mg tablet, Take 1-2 tablets by mouth every 8 hours if needed for severe pain (7 - 10)., Disp: 75 tablet, Rfl: 0    pantoprazole (ProtoNix) 40 mg EC tablet, TAKE 1 TABLET (40 MG) BY MOUTH DAILY DO NOT CRUSH, CHEW, ORSPLIT, Disp: 30 tablet, Rfl: 3    testosterone 20.25 mg/1.25 gram (1.62 %) gel in metered-dose pump, Place 1 pump on each shoulder daily for total of 2 pumps per day, Disp: 75 g, Rfl: 0    testosterone 20.25 mg/1.25 gram (1.62 %) gel in metered-dose pump, PLACE 1 PUMP ON EACH SHOULDER DAILY FOR TOTAL OF 2 PUMPS PER DAY, Disp: 75 g, Rfl: 1    traMADol (Ultram) 50 mg tablet, Take 1 tablet (50 mg) by mouth 2 times a day as needed for severe pain (7 - 10)., Disp: 60 tablet, Rfl: 0    ipratropium (Atrovent) 21 mcg (0.03 %) nasal spray, ADMINISTER 2 SPRAYS INTO EACH NOSTRIL EVERY 12 HOURS. (Patient not taking: Reported on 1/30/2025), Disp: 30 mL, Rfl: 5    rosuvastatin (Crestor) 10 mg tablet, TAKE 1 TABLET BY MOUTH EVERY DAY (Patient not taking: Reported on 1/30/2025), Disp: 90 tablet, Rfl: 3  No Known Allergies    Physical Examination:    General: Well developed, awake/alert/oriented x3, no distress, alert and cooperative  Skin: Warm and dry, no lesions, no rashes  ENMT: Mucous membranes moist, no apparent injury, no lesions seen  Head/Neck: Neck Supple, no apparent injury  Respiratory/Thorax: Normal breath sounds with good chest  expansion, thorax symmetric  Cardiovascular: No pitting edema, no JVD    Motor Strength: 5/5 Throughout all extremities    Muscle Bulk: Normal and symmetric in all extremities    Posture:   -- Cervical: Normal  -- Thoracic: Normal  -- Lumbar : Normal  Paraspinal muscle spasm/tenderness present left upper cervical   Midline tenderness absent    Sensation: intact to light touch      Results:  I personally reviewed and interpreted the imaging results which included MRI cervical spine from December 2023 showing adjacent level disease C3-4 left greater than right with moderate foraminal stenosis.    Assessment and Plan:    Mikey Jean is a 59 y.o. year old male who presents to the spine clinic in follow up with doing gradually worsening left-sided neck pain with radiating symptoms into his neck, head, and face.  He has history of C4-6 ACDF by Dr. Parker years prior and more recent C6-7 ACDF by Dr. Daley.  Prior radicular symptoms are continued improved though the symptoms have been worsening.  He is utilizing methotrexate, hydroxychloroquine, and Meloxicam, Percocet and pain continues.  He denies radiating symptoms past his shoulder into his left or right arm.  Denies any new numbness or weakness in his arms.  Balance instability continue intact.     1 year further down the road possible worsening of C3-4 adjacent level disease leading to current radicular symptoms into his posterior head and face.  Will need updated x-rays with flexion-extension and MRI for further evaluation and follow-up with attending neurosurgeon after.  We also consider return to pain management for diagnostic versus therapeutic injection.      I have reviewed all prior documentation and reviewed the electronic medical record since admission. I have personally have reviewed all advanced imaging not just the reports and used my interpretation as documented as the relevant findings. I have reviewed the risks and benefits of all treatment  recommendations listed in this note with the patient and family.       The above clinical summary has been dictated with voice recognition software. It has not been proofread for grammatical errors, typographical mistakes, or other semantic inconsistencies.    Thank you for visiting our office today. It was our pleasure to take part in your healthcare.     Do not hesitate to call with any questions regarding your plan of care after leaving at [phone] M-F 8am-4pm.     To clinicians, thank you very much for this kind referral. It is a privilege to partner with you in the care of your patients. My office would be delighted to assist you with any further consultations or with questions regarding the plan of care outlined. Do not hesitate to call the office or contact me directly.       Sincerely,      VISHNU Durham, PANancyC  Associate Physician Assistant, Neurosurgery  Clinical   [facility] [employer] [place] [address]    Phone: [phone]  Fax: [phone]

## 2025-01-31 ENCOUNTER — APPOINTMENT (OUTPATIENT)
Dept: SLEEP MEDICINE | Facility: CLINIC | Age: 60
End: 2025-01-31
Payer: COMMERCIAL

## 2025-02-06 ENCOUNTER — HOSPITAL ENCOUNTER (OUTPATIENT)
Dept: RADIOLOGY | Facility: CLINIC | Age: 60
Discharge: HOME | End: 2025-02-06
Payer: COMMERCIAL

## 2025-02-06 DIAGNOSIS — Z98.1 STATUS POST CERVICAL SPINAL FUSION: ICD-10-CM

## 2025-02-06 DIAGNOSIS — M54.12 CERVICAL RADICULOPATHY, CHRONIC: ICD-10-CM

## 2025-02-06 PROCEDURE — 72141 MRI NECK SPINE W/O DYE: CPT

## 2025-02-07 DIAGNOSIS — M54.50 LUMBAR PAIN: ICD-10-CM

## 2025-02-07 DIAGNOSIS — M54.9 UPPER BACK PAIN: ICD-10-CM

## 2025-02-07 DIAGNOSIS — G62.9 NEUROPATHY: ICD-10-CM

## 2025-02-07 DIAGNOSIS — G89.29 CHRONIC RADICULAR LOW BACK PAIN: ICD-10-CM

## 2025-02-07 DIAGNOSIS — M54.16 CHRONIC RADICULAR LOW BACK PAIN: ICD-10-CM

## 2025-02-07 RX ORDER — TRAMADOL HYDROCHLORIDE 50 MG/1
50 TABLET ORAL 2 TIMES DAILY PRN
Qty: 60 TABLET | Refills: 0 | Status: SHIPPED | OUTPATIENT
Start: 2025-02-07 | End: 2025-03-09

## 2025-02-07 RX ORDER — OXYCODONE AND ACETAMINOPHEN 5; 325 MG/1; MG/1
1-2 TABLET ORAL EVERY 8 HOURS PRN
Qty: 75 TABLET | Refills: 0 | Status: SHIPPED | OUTPATIENT
Start: 2025-02-07

## 2025-02-07 RX ORDER — GABAPENTIN 300 MG/1
CAPSULE ORAL
Qty: 150 CAPSULE | Refills: 0 | Status: SHIPPED | OUTPATIENT
Start: 2025-02-07

## 2025-02-07 NOTE — TELEPHONE ENCOUNTER
Please advise  Medication pended   Last office visit 11/21/24  Next office visit not scheduled     CSA, UDS 7/3/24    [facility] Jean [facility] [address] Clinical Support Staff  Phone Number: [phone]     Good morning  It is that time of the month. I’m a little late in responding  Can I please get my gabapentin, tramadol and Percocet refilled.  Thank you  Mikey

## 2025-02-10 DIAGNOSIS — M54.12 CERVICAL RADICULOPATHY, CHRONIC: Primary | ICD-10-CM

## 2025-02-17 ENCOUNTER — OFFICE VISIT (OUTPATIENT)
Dept: PAIN MEDICINE | Facility: CLINIC | Age: 60
End: 2025-02-17
Payer: COMMERCIAL

## 2025-02-17 VITALS
BODY MASS INDEX: 26.63 KG/M2 | WEIGHT: 186 LBS | OXYGEN SATURATION: 97 % | HEART RATE: 96 BPM | SYSTOLIC BLOOD PRESSURE: 127 MMHG | DIASTOLIC BLOOD PRESSURE: 72 MMHG | TEMPERATURE: 95.9 F | HEIGHT: 70 IN | RESPIRATION RATE: 10 BRPM

## 2025-02-17 DIAGNOSIS — M54.12 CERVICAL RADICULOPATHY, CHRONIC: ICD-10-CM

## 2025-02-17 DIAGNOSIS — M47.812 CERVICAL SPONDYLOSIS WITHOUT MYELOPATHY: Primary | ICD-10-CM

## 2025-02-17 PROBLEM — Z20.822 CONTACT WITH AND (SUSPECTED) EXPOSURE TO COVID-19: Status: ACTIVE | Noted: 2022-09-27

## 2025-02-17 PROBLEM — Z98.1 HISTORY OF CERVICAL SPINAL ARTHRODESIS: Status: ACTIVE | Noted: 2025-02-17

## 2025-02-17 PROCEDURE — 99215 OFFICE O/P EST HI 40 MIN: CPT | Performed by: ANESTHESIOLOGY

## 2025-02-17 PROCEDURE — 1036F TOBACCO NON-USER: CPT | Performed by: ANESTHESIOLOGY

## 2025-02-17 PROCEDURE — 3008F BODY MASS INDEX DOCD: CPT | Performed by: ANESTHESIOLOGY

## 2025-02-17 ASSESSMENT — LIFESTYLE VARIABLES
HOW OFTEN DO YOU HAVE SIX OR MORE DRINKS ON ONE OCCASION: NEVER
HOW OFTEN DO YOU HAVE A DRINK CONTAINING ALCOHOL: NEVER
SKIP TO QUESTIONS 9-10: 1
HOW MANY STANDARD DRINKS CONTAINING ALCOHOL DO YOU HAVE ON A TYPICAL DAY: PATIENT DOES NOT DRINK
AUDIT-C TOTAL SCORE: 0

## 2025-02-17 ASSESSMENT — ANXIETY QUESTIONNAIRES
4. TROUBLE RELAXING: NOT AT ALL
2. NOT BEING ABLE TO STOP OR CONTROL WORRYING: NOT AT ALL
1. FEELING NERVOUS, ANXIOUS, OR ON EDGE: NOT AT ALL
6. BECOMING EASILY ANNOYED OR IRRITABLE: NOT AT ALL
3. WORRYING TOO MUCH ABOUT DIFFERENT THINGS: NOT AT ALL
5. BEING SO RESTLESS THAT IT IS HARD TO SIT STILL: NOT AT ALL
7. FEELING AFRAID AS IF SOMETHING AWFUL MIGHT HAPPEN: NOT AT ALL
GAD7 TOTAL SCORE: 0

## 2025-02-17 ASSESSMENT — PAIN SCALES - GENERAL
PAINLEVEL_OUTOF10: 4
PAINLEVEL_OUTOF10: 4

## 2025-02-17 ASSESSMENT — ENCOUNTER SYMPTOMS
OCCASIONAL FEELINGS OF UNSTEADINESS: 0
BLOOD IN STOOL: 0
FEVER: 0
NUMBNESS: 1
NECK PAIN: 1
DEPRESSION: 0
SHORTNESS OF BREATH: 0
EYE PAIN: 0
LOSS OF SENSATION IN FEET: 0

## 2025-02-17 ASSESSMENT — PAIN DESCRIPTION - DESCRIPTORS: DESCRIPTORS: NUMBNESS;TINGLING

## 2025-02-17 ASSESSMENT — PAIN - FUNCTIONAL ASSESSMENT: PAIN_FUNCTIONAL_ASSESSMENT: 0-10

## 2025-02-17 ASSESSMENT — COLUMBIA-SUICIDE SEVERITY RATING SCALE - C-SSRS
6. HAVE YOU EVER DONE ANYTHING, STARTED TO DO ANYTHING, OR PREPARED TO DO ANYTHING TO END YOUR LIFE?: NO
1. IN THE PAST MONTH, HAVE YOU WISHED YOU WERE DEAD OR WISHED YOU COULD GO TO SLEEP AND NOT WAKE UP?: NO
2. HAVE YOU ACTUALLY HAD ANY THOUGHTS OF KILLING YOURSELF?: NO

## 2025-02-17 NOTE — PROGRESS NOTES
Simon Each Box that Applies Female Male   FAMILY HISTORY OF SUBSTANCE ABUSE  Simon the boxes that applies   Alcohol ?  1    ? 3   Illegal drugs ?  2 ? 3   Rx drugs ?  4 ? 4   PERSONAL HISTORY OF SUBSTNACE ABUSE   Alcohol ?  3 ?  3   Illegal drugs ?  4 ?  4    Rx drugs ?  5 ?  5   Age Between 16-45 years ?  1 ?  1   History of Preadolescent Sexual Abuse ?  3 ?  0   PSYCHOLOGIC DISEASE   ADD, OCD, bipolar, schizophrenia ?  2 ?  2   Depression ?  1 ?  1   Scoring Totals       Scoring (Risk)  0-3 - Low  4-7 - Moderate  8 - High    Opioid Risk Assessment Score 0/26

## 2025-02-17 NOTE — H&P (VIEW-ONLY)
Chief Complain  New Patient Visit (Recent MRI shows issues above my fusion at C3/4. PA suggested to start with a shot to the area. Numbness going into left shoulder, up to left ear and left side of face. pain is local to that area. Infusion 09/2022. Pain level is 4 on LT side. Headaches. Worried about the numbness and tingling. Take percocet, tramadol, cyclobenzaprine, mobic, take off edge.)       History Of Present Illness  Mikey Jean is a 59 y.o. male here for neck radiating to left shoulder and left Jaw. The patient rates the pain at 4  on a scale from 0-10.  The patient describes pain as numbness and tingling.  The pain is worsened by  turning to the left side  and is alleviated by nothing relieves the pain.  Since the last visit the pain has worsened.  The patient denies any fever, chills, weight loss, bladder/bowel incontinence.         Past Medical History  He has a past medical history of Ankylosing spondylitis of site in spine (Multi), Chronic pain disorder, Contact with and (suspected) exposure to covid-19 (01/13/2021), Encounter for general adult medical examination without abnormal findings (05/04/2022), Joint pain, Low back pain, Lumbosacral disc disease, Neck pain, Osteoarthritis, Peripheral neuropathy, and Spinal stenosis.    Surgical History  He has a past surgical history that includes CT angio coronary art with heartflow if score >30% (09/15/2023); Back surgery; Laminectomy; Neck surgery; Spine surgery; Trigger point injection; Spinal fusion; and Epidural block injection.     Social History  He reports that he has never smoked. He has never used smokeless tobacco. He reports current alcohol use. He reports that he does not use drugs.    Family History  Family History   Problem Relation Name Age of Onset    Cancer Mother      Other ( maker) Brother          Allergies  Patient has no known allergies.    Review of Systems  Review of Systems   Constitutional:  Negative for fever.   HENT:   Negative for ear pain.    Eyes:  Negative for pain.   Respiratory:  Negative for shortness of breath.    Cardiovascular:  Negative for chest pain.   Gastrointestinal:  Negative for blood in stool.   Musculoskeletal:  Positive for neck pain.   Neurological:  Positive for numbness.   Psychiatric/Behavioral:  Negative for suicidal ideas.         Physical Exam  Physical Exam  Constitutional:       Appearance: Normal appearance.   HENT:      Head: Normocephalic and atraumatic.   Eyes:      Extraocular Movements: Extraocular movements intact.      Pupils: Pupils are equal, round, and reactive to light.   Cardiovascular:      Rate and Rhythm: Normal rate and regular rhythm.   Pulmonary:      Effort: Pulmonary effort is normal.   Abdominal:      General: Abdomen is flat.   Neurological:      General: No focal deficit present.      Mental Status: He is alert and oriented to person, place, and time.      Motor: Motor function is intact.      Coordination: Coordination is intact.      Gait: Gait is intact.      Deep Tendon Reflexes:      Reflex Scores:       Tricep reflexes are 2+ on the right side and 2+ on the left side.       Bicep reflexes are 2+ on the right side and 2+ on the left side.       Brachioradialis reflexes are 2+ on the right side and 2+ on the left side.       Patellar reflexes are 2+ on the right side and 2+ on the left side.       Achilles reflexes are 2+ on the right side and 2+ on the left side.  Psychiatric:         Mood and Affect: Mood normal.         Behavior: Behavior normal.           Reviewed Images  Reviewed and independently interpreted MRI Cervical spine C4-C7 fusion, adjacent segment disease at C3-4 with severe bilateral neuroforaminal narrowing, facet arthrosis at C2-3, C3-4 on the left side with effusion    Reviewed Labs   Latest Reference Range & Units 12/22/23 08:23   WBC 4.4 - 11.3 x10*3/uL 7.6   nRBC 0.0 - 0.0 /100 WBCs 0.0   RBC 4.50 - 5.90 x10*6/uL 4.35 (L)   HEMOGLOBIN 13.5 - 17.5 g/dL  "14.1   HEMATOCRIT 41.0 - 52.0 % 43.6   MCV 80 - 100 fL 100   MCH 26.0 - 34.0 pg 32.4   MCHC 32.0 - 36.0 g/dL 32.3   RED CELL DISTRIBUTION WIDTH 11.5 - 14.5 % 12.9   Platelets 150 - 450 x10*3/uL 242   Neutrophils % 40.0 - 80.0 % 52.0   Immature Granulocytes %, Automated 0.0 - 0.9 % 0.1   Lymphocytes % 13.0 - 44.0 % 34.4   Monocytes % 2.0 - 10.0 % 7.5   Eosinophils % 0.0 - 6.0 % 5.0   Basophils % 0.0 - 2.0 % 1.0   Neutrophils Absolute 1.20 - 7.70 x10*3/uL 3.97   Immature Granulocytes Absolute, Automated 0.00 - 0.70 x10*3/uL 0.01   Lymphocytes Absolute 1.20 - 4.80 x10*3/uL 2.63   Monocytes Absolute 0.10 - 1.00 x10*3/uL 0.57   Eosinophils Absolute 0.00 - 0.70 x10*3/uL 0.38   Basophils Absolute 0.00 - 0.10 x10*3/uL 0.08   (L): Data is abnormally low   Latest Reference Range & Units 12/22/23 08:23   GLUCOSE 74 - 99 mg/dL 96   SODIUM 136 - 145 mmol/L 138   POTASSIUM 3.5 - 5.3 mmol/L 4.7   CHLORIDE 98 - 107 mmol/L 100   Bicarbonate 21 - 32 mmol/L 30   Anion Gap 10 - 20 mmol/L 13   Blood Urea Nitrogen 6 - 23 mg/dL 19   Creatinine 0.50 - 1.30 mg/dL 0.87   EGFR >60 mL/min/1.73m*2 >90   Calcium 8.6 - 10.3 mg/dL 9.0   Albumin 3.4 - 5.0 g/dL 4.3   Alkaline Phosphatase 33 - 136 U/L 60   ALT 10 - 52 U/L 27   AST 9 - 39 U/L 33   Bilirubin Total 0.0 - 1.2 mg/dL 0.6   HDL CHOLESTEROL mg/dL 71.2   Cholesterol/HDL Ratio  2.3   LDL Calculated <=99 mg/dL 59   VLDL 0 - 40 mg/dL 37   TRIGLYCERIDES 0 - 149 mg/dL 184 (H)   Non HDL Cholesterol 0 - 149 mg/dL 96   (H): Data is abnormally high    Last Recorded Vitals  Blood pressure 127/72, pulse 96, temperature 35.5 °C (95.9 °F), temperature source Temporal, resp. rate 10, height 1.778 m (5' 10\"), weight 84.4 kg (186 lb), SpO2 97%.       Assessment/Plan     Mikey Jean is a 59 y.o. male here for evaluation of left-sided neck pain and numbness of left shoulder and left angle of the jaw.  He has been experiencing the symptoms last 6 months and been gradually getting worse.  He is status post " couple of fusions, fused from C4-C7.  Reviewed recently done MRI of his cervical spine revealed C3-4 neural foraminal stenosis with facet arthrosis worse on the left side at C3-4 with effusion.  On physical examination does not have any focal neurological deficit however he does have tenderness over cervical facets on the upper part of his neck.  Recommend trial of diagnostic and therapeutic left C3-4 facet joint injection as next step in managing his pain. Asked him to seek urgent medical attention for any worsening neurological or constitutional symptoms.    Total time spent caring for the patient today was 41 minutes. This includes time spent before the visit reviewing the chart, time spent during the visit, and time spent after the visit on documentation.      Jayce Luong MD

## 2025-02-17 NOTE — PROGRESS NOTES
Chief Complain  New Patient Visit (Recent MRI shows issues above my fusion at C3/4. PA suggested to start with a shot to the area. Numbness going into left shoulder, up to left ear and left side of face. pain is local to that area. Infusion 09/2022. Pain level is 4 on LT side. Headaches. Worried about the numbness and tingling. Take percocet, tramadol, cyclobenzaprine, mobic, take off edge.)       History Of Present Illness  Mikey Jean is a 59 y.o. male here for neck radiating to left shoulder and left Jaw. The patient rates the pain at 4  on a scale from 0-10.  The patient describes pain as numbness and tingling.  The pain is worsened by  turning to the left side  and is alleviated by nothing relieves the pain.  Since the last visit the pain has worsened.  The patient denies any fever, chills, weight loss, bladder/bowel incontinence.         Past Medical History  He has a past medical history of Ankylosing spondylitis of site in spine (Multi), Chronic pain disorder, Contact with and (suspected) exposure to covid-19 (01/13/2021), Encounter for general adult medical examination without abnormal findings (05/04/2022), Joint pain, Low back pain, Lumbosacral disc disease, Neck pain, Osteoarthritis, Peripheral neuropathy, and Spinal stenosis.    Surgical History  He has a past surgical history that includes CT angio coronary art with heartflow if score >30% (09/15/2023); Back surgery; Laminectomy; Neck surgery; Spine surgery; Trigger point injection; Spinal fusion; and Epidural block injection.     Social History  He reports that he has never smoked. He has never used smokeless tobacco. He reports current alcohol use. He reports that he does not use drugs.    Family History  Family History   Problem Relation Name Age of Onset    Cancer Mother      Other ( maker) Brother          Allergies  Patient has no known allergies.    Review of Systems  Review of Systems   Constitutional:  Negative for fever.   HENT:   Negative for ear pain.    Eyes:  Negative for pain.   Respiratory:  Negative for shortness of breath.    Cardiovascular:  Negative for chest pain.   Gastrointestinal:  Negative for blood in stool.   Musculoskeletal:  Positive for neck pain.   Neurological:  Positive for numbness.   Psychiatric/Behavioral:  Negative for suicidal ideas.         Physical Exam  Physical Exam  Constitutional:       Appearance: Normal appearance.   HENT:      Head: Normocephalic and atraumatic.   Eyes:      Extraocular Movements: Extraocular movements intact.      Pupils: Pupils are equal, round, and reactive to light.   Cardiovascular:      Rate and Rhythm: Normal rate and regular rhythm.   Pulmonary:      Effort: Pulmonary effort is normal.   Abdominal:      General: Abdomen is flat.   Neurological:      General: No focal deficit present.      Mental Status: He is alert and oriented to person, place, and time.      Motor: Motor function is intact.      Coordination: Coordination is intact.      Gait: Gait is intact.      Deep Tendon Reflexes:      Reflex Scores:       Tricep reflexes are 2+ on the right side and 2+ on the left side.       Bicep reflexes are 2+ on the right side and 2+ on the left side.       Brachioradialis reflexes are 2+ on the right side and 2+ on the left side.       Patellar reflexes are 2+ on the right side and 2+ on the left side.       Achilles reflexes are 2+ on the right side and 2+ on the left side.  Psychiatric:         Mood and Affect: Mood normal.         Behavior: Behavior normal.           Reviewed Images  Reviewed and independently interpreted MRI Cervical spine C4-C7 fusion, adjacent segment disease at C3-4 with severe bilateral neuroforaminal narrowing, facet arthrosis at C2-3, C3-4 on the left side with effusion    Reviewed Labs   Latest Reference Range & Units 12/22/23 08:23   WBC 4.4 - 11.3 x10*3/uL 7.6   nRBC 0.0 - 0.0 /100 WBCs 0.0   RBC 4.50 - 5.90 x10*6/uL 4.35 (L)   HEMOGLOBIN 13.5 - 17.5 g/dL  "14.1   HEMATOCRIT 41.0 - 52.0 % 43.6   MCV 80 - 100 fL 100   MCH 26.0 - 34.0 pg 32.4   MCHC 32.0 - 36.0 g/dL 32.3   RED CELL DISTRIBUTION WIDTH 11.5 - 14.5 % 12.9   Platelets 150 - 450 x10*3/uL 242   Neutrophils % 40.0 - 80.0 % 52.0   Immature Granulocytes %, Automated 0.0 - 0.9 % 0.1   Lymphocytes % 13.0 - 44.0 % 34.4   Monocytes % 2.0 - 10.0 % 7.5   Eosinophils % 0.0 - 6.0 % 5.0   Basophils % 0.0 - 2.0 % 1.0   Neutrophils Absolute 1.20 - 7.70 x10*3/uL 3.97   Immature Granulocytes Absolute, Automated 0.00 - 0.70 x10*3/uL 0.01   Lymphocytes Absolute 1.20 - 4.80 x10*3/uL 2.63   Monocytes Absolute 0.10 - 1.00 x10*3/uL 0.57   Eosinophils Absolute 0.00 - 0.70 x10*3/uL 0.38   Basophils Absolute 0.00 - 0.10 x10*3/uL 0.08   (L): Data is abnormally low   Latest Reference Range & Units 12/22/23 08:23   GLUCOSE 74 - 99 mg/dL 96   SODIUM 136 - 145 mmol/L 138   POTASSIUM 3.5 - 5.3 mmol/L 4.7   CHLORIDE 98 - 107 mmol/L 100   Bicarbonate 21 - 32 mmol/L 30   Anion Gap 10 - 20 mmol/L 13   Blood Urea Nitrogen 6 - 23 mg/dL 19   Creatinine 0.50 - 1.30 mg/dL 0.87   EGFR >60 mL/min/1.73m*2 >90   Calcium 8.6 - 10.3 mg/dL 9.0   Albumin 3.4 - 5.0 g/dL 4.3   Alkaline Phosphatase 33 - 136 U/L 60   ALT 10 - 52 U/L 27   AST 9 - 39 U/L 33   Bilirubin Total 0.0 - 1.2 mg/dL 0.6   HDL CHOLESTEROL mg/dL 71.2   Cholesterol/HDL Ratio  2.3   LDL Calculated <=99 mg/dL 59   VLDL 0 - 40 mg/dL 37   TRIGLYCERIDES 0 - 149 mg/dL 184 (H)   Non HDL Cholesterol 0 - 149 mg/dL 96   (H): Data is abnormally high    Last Recorded Vitals  Blood pressure 127/72, pulse 96, temperature 35.5 °C (95.9 °F), temperature source Temporal, resp. rate 10, height 1.778 m (5' 10\"), weight 84.4 kg (186 lb), SpO2 97%.       Assessment/Plan     Mikey Jean is a 59 y.o. male here for evaluation of left-sided neck pain and numbness of left shoulder and left angle of the jaw.  He has been experiencing the symptoms last 6 months and been gradually getting worse.  He is status post " couple of fusions, fused from C4-C7.  Reviewed recently done MRI of his cervical spine revealed C3-4 neural foraminal stenosis with facet arthrosis worse on the left side at C3-4 with effusion.  On physical examination does not have any focal neurological deficit however he does have tenderness over cervical facets on the upper part of his neck.  Recommend trial of diagnostic and therapeutic left C3-4 fusion his neck step in managing his pain.  Him to seek urgent medical attention for any worsening neurological or constitutional symptoms.    Total time spent caring for the patient today was 41 minutes. This includes time spent before the visit reviewing the chart, time spent during the visit, and time spent after the visit on documentation.      Jayce Luong MD

## 2025-03-01 DIAGNOSIS — G89.29 OTHER CHRONIC PAIN: ICD-10-CM

## 2025-03-01 DIAGNOSIS — M54.16 RADICULOPATHY, LUMBAR REGION: ICD-10-CM

## 2025-03-03 RX ORDER — MELOXICAM 15 MG/1
15 TABLET ORAL DAILY
Qty: 90 TABLET | Refills: 1 | Status: SHIPPED | OUTPATIENT
Start: 2025-03-03

## 2025-03-05 ENCOUNTER — APPOINTMENT (OUTPATIENT)
Dept: PRIMARY CARE | Facility: CLINIC | Age: 60
End: 2025-03-05
Payer: COMMERCIAL

## 2025-03-05 VITALS
BODY MASS INDEX: 27.23 KG/M2 | TEMPERATURE: 97.6 F | WEIGHT: 190.2 LBS | SYSTOLIC BLOOD PRESSURE: 140 MMHG | DIASTOLIC BLOOD PRESSURE: 80 MMHG | HEART RATE: 90 BPM | OXYGEN SATURATION: 95 % | HEIGHT: 70 IN

## 2025-03-05 DIAGNOSIS — E55.9 VITAMIN D DEFICIENCY: ICD-10-CM

## 2025-03-05 DIAGNOSIS — M45.5 ANKYLOSING SPONDYLITIS OF THORACOLUMBAR REGION (MULTI): Primary | ICD-10-CM

## 2025-03-05 DIAGNOSIS — M54.9 UPPER BACK PAIN: ICD-10-CM

## 2025-03-05 DIAGNOSIS — Z00.00 ROUTINE ADULT HEALTH MAINTENANCE: ICD-10-CM

## 2025-03-05 DIAGNOSIS — M54.16 CHRONIC RADICULAR LOW BACK PAIN: ICD-10-CM

## 2025-03-05 DIAGNOSIS — E16.2 HYPOGLYCEMIA: ICD-10-CM

## 2025-03-05 DIAGNOSIS — M54.50 LUMBAR PAIN: ICD-10-CM

## 2025-03-05 DIAGNOSIS — R79.89 LOW TESTOSTERONE IN MALE: ICD-10-CM

## 2025-03-05 DIAGNOSIS — Z12.5 SCREENING FOR PROSTATE CANCER: ICD-10-CM

## 2025-03-05 DIAGNOSIS — G89.29 CHRONIC RADICULAR LOW BACK PAIN: ICD-10-CM

## 2025-03-05 DIAGNOSIS — G62.9 NEUROPATHY: ICD-10-CM

## 2025-03-05 DIAGNOSIS — E53.8 VITAMIN B12 DEFICIENCY: ICD-10-CM

## 2025-03-05 DIAGNOSIS — N52.9 ERECTILE DYSFUNCTION, UNSPECIFIED ERECTILE DYSFUNCTION TYPE: ICD-10-CM

## 2025-03-05 PROCEDURE — 99214 OFFICE O/P EST MOD 30 MIN: CPT | Performed by: FAMILY MEDICINE

## 2025-03-05 PROCEDURE — 1036F TOBACCO NON-USER: CPT | Performed by: FAMILY MEDICINE

## 2025-03-05 PROCEDURE — 3008F BODY MASS INDEX DOCD: CPT | Performed by: FAMILY MEDICINE

## 2025-03-05 RX ORDER — OXYCODONE AND ACETAMINOPHEN 5; 325 MG/1; MG/1
1-2 TABLET ORAL EVERY 8 HOURS PRN
Qty: 75 TABLET | Refills: 0 | Status: SHIPPED | OUTPATIENT
Start: 2025-03-05

## 2025-03-05 RX ORDER — TRAMADOL HYDROCHLORIDE 50 MG/1
50 TABLET ORAL 2 TIMES DAILY PRN
Qty: 60 TABLET | Refills: 2 | Status: SHIPPED | OUTPATIENT
Start: 2025-03-05 | End: 2025-04-04

## 2025-03-05 RX ORDER — SILDENAFIL 100 MG/1
TABLET, FILM COATED ORAL
Qty: 12 TABLET | Refills: 11 | Status: SHIPPED | OUTPATIENT
Start: 2025-03-05

## 2025-03-05 RX ORDER — GABAPENTIN 300 MG/1
CAPSULE ORAL
Qty: 150 CAPSULE | Refills: 2 | Status: SHIPPED | OUTPATIENT
Start: 2025-03-05

## 2025-03-05 NOTE — PROGRESS NOTES
Subjective   Patient ID: Mikey Jean is a 59 y.o. male who presents for Back Pain.  HPI    Patient presents today for a follow-up on back pain. Is taking Tramadol. States he has no concerns with this medication. Rates the pain a 5/10 over the past 7 days. Reports that the medication gives 50% pain control/relief. OARRS reviewed today. Controlled substance contract signed 7/3/24. Last took it this afternoon.    Patient presents today for a follow-up on Back pain. Is taking Percocet. States he has no concerns with this medication. Rates the pain a 5/10 over the past 7 days. Reports that the medication gives 50% pain control/relief. OARRS reviewed today. Controlled substance contract signed 7/3/24. Last took it last night.    Patient would like to inform you that he is having a TB test done for a medication change.     Patient states his left side is horrible as he continues to have numbness of the left shoulder and left angle of the jaw. Has mild headaches as well. He is scheduled for a cervical spine injection in C3-C4 on Friday by pain management.     Patient admits to having erectile dysfunction. He has never tried Cialis or Viagra. He remains on Testosterone gel. We discussed that his Testosterone could be low due to opioids.  And also that he is fighting through his other meds      Review of systems  ; Patient seen today for exam denies any problems with headaches or vision, denies any shortness of breath chest pain nausea or vomiting, no black stool no blood in the stool no heartburn type symptoms denies any problems with constipation or diarrhea, and no dysuria-type symptoms    The patient's allergies medications were reviewed with them today    The patient's social family and surgical history or also reviewed here today, along with her past medical history.     Objective     Alert and active in  no acute distress  HEENT TMs clear oropharynx negative nares clear no drainage noted neck supple  With no  "adenopathy   Heart regular rate and rhythm without murmur and no carotid bruits  Lungs- clear to auscultation bilaterally, no wheeze or rhonchi noted  Thyroid -negative masses or nodularity  Abdomen- soft times four quadrants , bowel sounds positive no masses or organomegaly, negative tenderness guarding or rebound  Neurological exam unremarkable- DTRs in upper and lower extremities within normal limits.   skin -no lesions noted      /80 (BP Location: Right arm, Patient Position: Sitting, BP Cuff Size: Adult)   Pulse 90   Temp 36.4 °C (97.6 °F) (Temporal)   Ht 1.778 m (5' 10\")   Wt 86.3 kg (190 lb 3.2 oz)   SpO2 95%   BMI 27.29 kg/m²     No Known Allergies    Assessment/Plan   Problem List Items Addressed This Visit       Chronic radicular low back pain    Relevant Medications    oxyCODONE-acetaminophen (Percocet) 5-325 mg tablet    Hypoglycemia    Relevant Orders    Lipid Panel    Vitamin B12    Lumbar pain    Relevant Medications    traMADol (Ultram) 50 mg tablet    Neuropathy    Relevant Medications    gabapentin (Neurontin) 300 mg capsule    Vitamin D deficiency    Relevant Orders    Vitamin D 25-Hydroxy,Total (for eval of Vitamin D levels)    Vitamin B12 deficiency    Relevant Orders    Vitamin B12    Upper back pain    Relevant Medications    traMADol (Ultram) 50 mg tablet    Low testosterone in male    Relevant Orders    Testosterone, total and free     Other Visit Diagnoses       Ankylosing spondylitis of thoracolumbar region (Multi)    -  Primary    Relevant Orders    T-Spot TB    Routine adult health maintenance        Relevant Orders    Comprehensive Metabolic Panel    CBC and Auto Differential    Screening for prostate cancer        Relevant Orders    Prostate Specific Antigen, Screen    Erectile dysfunction, unspecified erectile dysfunction type        Relevant Medications    sildenafil (Viagra) 100 mg tablet            Labs have been ordered, she/he will have these performed and we will " contact her/him with results.  (CBC, CMP, Lipid, PSA, Vitamin D, Vitamin B12, Testosterone, TB)    Refilled Gabapentin, Percocet, Tramadol.   Continue all the medications at the prescribed dose.    Prescribed Viagra 100 mg today.  Take as needed on empty stomach on the weekends if having congestion or headaches with it.    Reduce alcohol consumption.    If anything worsens or changes please call us at once, follow up in the office as planned,       Scribe Attestation  By signing my name below, IChristina, Gio   attest that this documentation has been prepared under the direction and in the presence of Luis Miguel Jasmine DO.

## 2025-03-07 ENCOUNTER — HOSPITAL ENCOUNTER (OUTPATIENT)
Dept: PAIN MEDICINE | Facility: CLINIC | Age: 60
Discharge: HOME | End: 2025-03-07
Payer: COMMERCIAL

## 2025-03-07 VITALS
TEMPERATURE: 97.2 F | BODY MASS INDEX: 26.69 KG/M2 | SYSTOLIC BLOOD PRESSURE: 116 MMHG | RESPIRATION RATE: 18 BRPM | OXYGEN SATURATION: 95 % | HEART RATE: 84 BPM | DIASTOLIC BLOOD PRESSURE: 80 MMHG | WEIGHT: 186 LBS

## 2025-03-07 DIAGNOSIS — M47.812 CERVICAL SPONDYLOSIS WITHOUT MYELOPATHY: ICD-10-CM

## 2025-03-07 PROCEDURE — 2550000001 HC RX 255 CONTRASTS: Performed by: ANESTHESIOLOGY

## 2025-03-07 PROCEDURE — 64490 INJ PARAVERT F JNT C/T 1 LEV: CPT | Performed by: ANESTHESIOLOGY

## 2025-03-07 PROCEDURE — 2500000004 HC RX 250 GENERAL PHARMACY W/ HCPCS (ALT 636 FOR OP/ED): Performed by: ANESTHESIOLOGY

## 2025-03-07 RX ORDER — LIDOCAINE HYDROCHLORIDE 10 MG/ML
INJECTION, SOLUTION EPIDURAL; INFILTRATION; INTRACAUDAL; PERINEURAL AS NEEDED
Status: COMPLETED | OUTPATIENT
Start: 2025-03-07 | End: 2025-03-07

## 2025-03-07 RX ORDER — METHYLPREDNISOLONE ACETATE 40 MG/ML
INJECTION, SUSPENSION INTRA-ARTICULAR; INTRALESIONAL; INTRAMUSCULAR; SOFT TISSUE AS NEEDED
Status: COMPLETED | OUTPATIENT
Start: 2025-03-07 | End: 2025-03-07

## 2025-03-07 RX ORDER — ROPIVACAINE HYDROCHLORIDE 5 MG/ML
INJECTION, SOLUTION EPIDURAL; INFILTRATION; PERINEURAL AS NEEDED
Status: COMPLETED | OUTPATIENT
Start: 2025-03-07 | End: 2025-03-07

## 2025-03-07 RX ADMIN — METHYLPREDNISOLONE ACETATE 20 MG: 40 INJECTION, SUSPENSION INTRA-ARTICULAR; INTRALESIONAL; INTRAMUSCULAR; INTRASYNOVIAL; SOFT TISSUE at 08:36

## 2025-03-07 RX ADMIN — ROPIVACAINE HYDROCHLORIDE 10 ML: 5 INJECTION, SOLUTION EPIDURAL; INFILTRATION; PERINEURAL at 08:35

## 2025-03-07 RX ADMIN — IOHEXOL 3 ML: 300 INJECTION, SOLUTION INTRAVENOUS at 08:35

## 2025-03-07 RX ADMIN — LIDOCAINE HYDROCHLORIDE 10 ML: 10 INJECTION, SOLUTION EPIDURAL; INFILTRATION; INTRACAUDAL; PERINEURAL at 08:35

## 2025-03-07 ASSESSMENT — PAIN SCALES - GENERAL
PAINLEVEL_OUTOF10: 5 - MODERATE PAIN
PAINLEVEL_OUTOF10: 5 - MODERATE PAIN

## 2025-03-07 ASSESSMENT — PAIN - FUNCTIONAL ASSESSMENT: PAIN_FUNCTIONAL_ASSESSMENT: 0-10

## 2025-03-11 DIAGNOSIS — R79.89 LOW TESTOSTERONE IN MALE: ICD-10-CM

## 2025-03-12 RX ORDER — TESTOSTERONE 20.25 MG/1.25G
GEL TOPICAL
Qty: 75 G | Refills: 1 | Status: SHIPPED | OUTPATIENT
Start: 2025-03-12

## 2025-04-09 DIAGNOSIS — M54.16 CHRONIC RADICULAR LOW BACK PAIN: ICD-10-CM

## 2025-04-09 DIAGNOSIS — M54.50 LUMBAR PAIN: ICD-10-CM

## 2025-04-09 DIAGNOSIS — G89.29 CHRONIC RADICULAR LOW BACK PAIN: ICD-10-CM

## 2025-04-09 DIAGNOSIS — M54.9 UPPER BACK PAIN: ICD-10-CM

## 2025-04-09 RX ORDER — OXYCODONE AND ACETAMINOPHEN 5; 325 MG/1; MG/1
1-2 TABLET ORAL EVERY 8 HOURS PRN
Qty: 75 TABLET | Refills: 0 | Status: SHIPPED | OUTPATIENT
Start: 2025-04-09

## 2025-04-09 RX ORDER — TRAMADOL HYDROCHLORIDE 50 MG/1
50 TABLET ORAL 2 TIMES DAILY PRN
Qty: 60 TABLET | Refills: 2 | Status: SHIPPED | OUTPATIENT
Start: 2025-04-09 | End: 2025-07-08

## 2025-04-09 NOTE — TELEPHONE ENCOUNTER
Last office visit 3/5/25  CSA, UDS 7/3/24  Medications pended     Please advise last section on patient message. Testosterone RX states to use 2 pumps daily.         Mikey Jean [id number] [id number] Clinical Support Staff  Phone Number: [phone]     Good afternoon.  My apologies for emailing a day before I am due.  Can i please get refills on my Tramadol and percocet?    Also, on anther note.  I know I still have to do my last blood draw that Brad had asked that I do.  Do I need to fast for these tests?    Also, the Testosterone that I use runs out now before the month end.  We went from two pumps to three. I wasn't sure if it mattered or not.    Thank karlo Jensen

## 2025-04-13 LAB
25(OH)D3+25(OH)D2 SERPL-MCNC: 79 NG/ML (ref 30–100)
ALBUMIN SERPL-MCNC: 4.4 G/DL (ref 3.6–5.1)
ALP SERPL-CCNC: 51 U/L (ref 35–144)
ALT SERPL-CCNC: 28 U/L (ref 9–46)
ANION GAP SERPL CALCULATED.4IONS-SCNC: 9 MMOL/L (CALC) (ref 7–17)
AST SERPL-CCNC: 34 U/L (ref 10–35)
BASOPHILS # BLD AUTO: 50 CELLS/UL (ref 0–200)
BASOPHILS NFR BLD AUTO: 0.8 %
BILIRUB SERPL-MCNC: 0.8 MG/DL (ref 0.2–1.2)
BUN SERPL-MCNC: 13 MG/DL (ref 7–25)
CALCIUM SERPL-MCNC: 9.7 MG/DL (ref 8.6–10.3)
CHLORIDE SERPL-SCNC: 99 MMOL/L (ref 98–110)
CHOLEST SERPL-MCNC: 148 MG/DL
CHOLEST/HDLC SERPL: 2.2 (CALC)
CO2 SERPL-SCNC: 30 MMOL/L (ref 20–32)
CREAT SERPL-MCNC: 1.04 MG/DL (ref 0.7–1.35)
EGFRCR SERPLBLD CKD-EPI 2021: 82 ML/MIN/1.73M2
EOSINOPHIL # BLD AUTO: 296 CELLS/UL (ref 15–500)
EOSINOPHIL NFR BLD AUTO: 4.7 %
ERYTHROCYTE [DISTWIDTH] IN BLOOD BY AUTOMATED COUNT: 12.6 % (ref 11–15)
GLUCOSE SERPL-MCNC: 99 MG/DL (ref 65–99)
HCT VFR BLD AUTO: 47.6 % (ref 38.5–50)
HDLC SERPL-MCNC: 68 MG/DL
HGB BLD-MCNC: 15.6 G/DL (ref 13.2–17.1)
IGNF NEG CNTRL BLD: NORMAL
LDLC SERPL CALC-MCNC: 58 MG/DL (CALC)
LYMPHOCYTES # BLD AUTO: 1506 CELLS/UL (ref 850–3900)
LYMPHOCYTES NFR BLD AUTO: 23.9 %
M TB IFN-G BLD-IMP: NEGATIVE
MCH RBC QN AUTO: 32.8 PG (ref 27–33)
MCHC RBC AUTO-ENTMCNC: 32.8 G/DL (ref 32–36)
MCV RBC AUTO: 100.2 FL (ref 80–100)
MITOGEN IGNF.SPOT COUNT BLD: NORMAL
MONOCYTES # BLD AUTO: 428 CELLS/UL (ref 200–950)
MONOCYTES NFR BLD AUTO: 6.8 %
NEUTROPHILS # BLD AUTO: 4019 CELLS/UL (ref 1500–7800)
NEUTROPHILS NFR BLD AUTO: 63.8 %
NONHDLC SERPL-MCNC: 80 MG/DL (CALC)
PLATELET # BLD AUTO: 254 THOUSAND/UL (ref 140–400)
PMV BLD REES-ECKER: 10.7 FL (ref 7.5–12.5)
POTASSIUM SERPL-SCNC: 5 MMOL/L (ref 3.5–5.3)
PROT SERPL-MCNC: 6.5 G/DL (ref 6.1–8.1)
PSA SERPL-MCNC: 0.28 NG/ML
QUEST PANEL A SPOT COUNT: 0
QUEST PANEL B SPOT COUNT: 0
RBC # BLD AUTO: 4.75 MILLION/UL (ref 4.2–5.8)
SODIUM SERPL-SCNC: 138 MMOL/L (ref 135–146)
TESTOST FREE SERPL-MCNC: 81.7 PG/ML (ref 35–155)
TESTOST SERPL-MCNC: 417 NG/DL (ref 250–1100)
TRIGL SERPL-MCNC: 132 MG/DL
VIT B12 SERPL-MCNC: >2000 PG/ML (ref 200–1100)
WBC # BLD AUTO: 6.3 THOUSAND/UL (ref 3.8–10.8)

## 2025-04-14 ENCOUNTER — TELEPHONE (OUTPATIENT)
Dept: PRIMARY CARE | Facility: CLINIC | Age: 60
End: 2025-04-14
Payer: COMMERCIAL

## 2025-04-14 NOTE — TELEPHONE ENCOUNTER
----- Message from Sherice Marroquin sent at 4/12/2025  9:29 AM EDT -----  DR. RODRIGUEZ PATIENT - Covering while he is out of the office.      Please let Mikey Jean know recent results are within normal or expected range except for high vitamin B12. Cut back on amount of B12 supplements.     Thanks,  Sherice

## 2025-04-25 ENCOUNTER — APPOINTMENT (OUTPATIENT)
Dept: PAIN MEDICINE | Facility: CLINIC | Age: 60
End: 2025-04-25
Payer: COMMERCIAL

## 2025-04-25 VITALS
HEIGHT: 70 IN | BODY MASS INDEX: 26.34 KG/M2 | TEMPERATURE: 97.7 F | OXYGEN SATURATION: 98 % | SYSTOLIC BLOOD PRESSURE: 131 MMHG | WEIGHT: 184 LBS | HEART RATE: 101 BPM | DIASTOLIC BLOOD PRESSURE: 77 MMHG | RESPIRATION RATE: 18 BRPM

## 2025-04-25 DIAGNOSIS — M47.812 CERVICAL SPONDYLOSIS WITHOUT MYELOPATHY: Primary | ICD-10-CM

## 2025-04-25 PROCEDURE — 99213 OFFICE O/P EST LOW 20 MIN: CPT | Performed by: ANESTHESIOLOGY

## 2025-04-25 ASSESSMENT — ENCOUNTER SYMPTOMS
EYE PAIN: 0
LOSS OF SENSATION IN FEET: 0
DEPRESSION: 0
SHORTNESS OF BREATH: 0
OCCASIONAL FEELINGS OF UNSTEADINESS: 0
NECK PAIN: 1
FEVER: 0
NUMBNESS: 1
BLOOD IN STOOL: 0

## 2025-04-25 ASSESSMENT — PAIN DESCRIPTION - DESCRIPTORS: DESCRIPTORS: ACHING

## 2025-04-25 ASSESSMENT — PAIN - FUNCTIONAL ASSESSMENT: PAIN_FUNCTIONAL_ASSESSMENT: 0-10

## 2025-04-25 ASSESSMENT — COLUMBIA-SUICIDE SEVERITY RATING SCALE - C-SSRS
1. IN THE PAST MONTH, HAVE YOU WISHED YOU WERE DEAD OR WISHED YOU COULD GO TO SLEEP AND NOT WAKE UP?: NO
6. HAVE YOU EVER DONE ANYTHING, STARTED TO DO ANYTHING, OR PREPARED TO DO ANYTHING TO END YOUR LIFE?: NO
2. HAVE YOU ACTUALLY HAD ANY THOUGHTS OF KILLING YOURSELF?: NO

## 2025-04-25 ASSESSMENT — PAIN SCALES - GENERAL
PAINLEVEL_OUTOF10: 4
PAINLEVEL_OUTOF10: 4

## 2025-04-25 NOTE — PROGRESS NOTES
Chief Complain  Follow-up (Had MBB on 3/7 with 0% relief, about 4 days later starting getting. Have 80% relief that's still lasting. Pain in low back.)       History Of Present Illness  Mikey Jean is a 59 y.o. male here for neck radiating to left shoulder and left Jaw. The patient rates the pain at 4 on a scale from 0-10.  The patient describes pain as aching  The pain is worsened by  turning to the left side  and is alleviated by nothing relieves the pain.  Since the last visit the pain has improved.  The patient denies any fever, chills, weight loss, bladder/bowel incontinence.         Past Medical History  He has a past medical history of Ankylosing spondylitis of site in spine (Multi), Chronic pain disorder, Contact with and (suspected) exposure to covid-19 (01/13/2021), Encounter for general adult medical examination without abnormal findings (05/04/2022), Joint pain, Low back pain, Lumbosacral disc disease, Neck pain, Osteoarthritis, Peripheral neuropathy, and Spinal stenosis.    Surgical History  He has a past surgical history that includes CT angio coronary art with heartflow if score >30% (09/15/2023); Back surgery; Laminectomy; Neck surgery; Spine surgery; Trigger point injection; Spinal fusion; and Epidural block injection.     Social History  He reports that he has never smoked. He has never used smokeless tobacco. He reports current alcohol use. He reports that he does not use drugs.    Family History  Family History   Problem Relation Name Age of Onset    Cancer Mother      Other ( maker) Brother          Allergies  Patient has no known allergies.    Review of Systems  Review of Systems   Constitutional:  Negative for fever.   HENT:  Negative for ear pain.    Eyes:  Negative for pain.   Respiratory:  Negative for shortness of breath.    Cardiovascular:  Negative for chest pain.   Gastrointestinal:  Negative for blood in stool.   Musculoskeletal:  Positive for neck pain.   Neurological:   "Positive for numbness.   Psychiatric/Behavioral:  Negative for suicidal ideas.         Physical Exam  Physical Exam  HENT:      Head: Normocephalic and atraumatic.   Eyes:      Extraocular Movements: Extraocular movements intact.      Pupils: Pupils are equal, round, and reactive to light.   Pulmonary:      Effort: Pulmonary effort is normal.   Musculoskeletal:      Cervical back: Neck supple.   Neurological:      Mental Status: He is alert.   Psychiatric:         Mood and Affect: Mood normal.           Reviewed Images  Reviewed and independently interpreted MRI Cervical spine C4-C7 fusion, adjacent segment disease at C3-4 with severe bilateral neuroforaminal narrowing, facet arthrosis at C2-3, C3-4 on the left side with effusion    Reviewed Labs     Latest Reference Range & Units 04/10/25 08:45   WHITE BLOOD CELL COUNT 3.8 - 10.8 Thousand/uL 6.3   RED BLOOD CELL COUNT 4.20 - 5.80 Million/uL 4.75   HEMOGLOBIN 13.2 - 17.1 g/dL 15.6   HEMATOCRIT 38.5 - 50.0 % 47.6   MCV 80.0 - 100.0 fL 100.2 (H)   MCH 27.0 - 33.0 pg 32.8   MCHC 32.0 - 36.0 g/dL 32.8   RDW 11.0 - 15.0 % 12.6   PLATELET COUNT 140 - 400 Thousand/uL 254   MPV 7.5 - 12.5 fL 10.7   (H): Data is abnormally high     Latest Reference Range & Units 04/10/25 08:45   GLUCOSE 65 - 99 mg/dL 99   SODIUM 135 - 146 mmol/L 138   POTASSIUM 3.5 - 5.3 mmol/L 5.0   CHLORIDE 98 - 110 mmol/L 99   CARBON DIOXIDE 20 - 32 mmol/L 30   ELECTROLYTE BALANCE 7 - 17 mmol/L (calc) 9   UREA NITROGEN (BUN) 7 - 25 mg/dL 13   CREATININE 0.70 - 1.35 mg/dL 1.04   EGFR > OR = 60 mL/min/1.73m2 82   CALCIUM 8.6 - 10.3 mg/dL 9.7   ALBUMIN 3.6 - 5.1 g/dL 4.4   PROTEIN, TOTAL 6.1 - 8.1 g/dL 6.5   ALKALINE PHOSPHATASE 35 - 144 U/L 51   ALT 9 - 46 U/L 28   AST 10 - 35 U/L 34   BILIRUBIN, TOTAL 0.2 - 1.2 mg/dL 0.8   CHOLESTEROL, TOTAL <200 mg/dL 148     Last Recorded Vitals  Blood pressure 131/77, pulse 101, temperature 36.5 °C (97.7 °F), resp. rate 18, height 1.778 m (5' 10\"), weight 83.5 kg (184 " lb), SpO2 98%.       Assessment/Plan     Mikey Jean is a 59 y.o. male here for evaluation of left-sided neck pain and numbness of left shoulder and left angle of the jaw.  He has been experiencing the symptoms last 6 months and been gradually getting worse.  He is status post couple of fusions, fused from C4-C7.  Reviewed recently done MRI of his cervical spine revealed C3-4 neural foraminal stenosis with facet arthrosis worse on the left side at C3-4 with effusion.  Last visit we did left C3-4 facet joint injection which has provided her with more than 70% relief of pain which is still ongoing.  He denies any new neurological, constitutional symptoms.  Continue with medical management as previous.  Consider repeat of facet joint injection if the pain returns.  I have personally reviewed the OARRS report.  I have considered the risks of abuse, dependence, addiction and diversion.      Jayce Luong MD

## 2025-05-03 DIAGNOSIS — F41.9 ANXIETY DISORDER, UNSPECIFIED TYPE: ICD-10-CM

## 2025-05-05 RX ORDER — DULOXETIN HYDROCHLORIDE 30 MG/1
30 CAPSULE, DELAYED RELEASE ORAL DAILY
Qty: 90 CAPSULE | Refills: 1 | Status: SHIPPED | OUTPATIENT
Start: 2025-05-05 | End: 2026-05-05

## 2025-05-07 DIAGNOSIS — M54.50 LUMBAR PAIN: ICD-10-CM

## 2025-05-07 DIAGNOSIS — M54.16 CHRONIC RADICULAR LOW BACK PAIN: ICD-10-CM

## 2025-05-07 DIAGNOSIS — R79.89 LOW TESTOSTERONE IN MALE: ICD-10-CM

## 2025-05-07 DIAGNOSIS — G89.29 CHRONIC RADICULAR LOW BACK PAIN: ICD-10-CM

## 2025-05-07 DIAGNOSIS — M54.9 UPPER BACK PAIN: ICD-10-CM

## 2025-05-08 ENCOUNTER — PATIENT MESSAGE (OUTPATIENT)
Dept: PRIMARY CARE | Facility: CLINIC | Age: 60
End: 2025-05-08
Payer: COMMERCIAL

## 2025-05-08 DIAGNOSIS — G62.9 NEUROPATHY: ICD-10-CM

## 2025-05-08 RX ORDER — TESTOSTERONE 20.25 MG/1.25G
GEL TOPICAL
Qty: 75 G | Refills: 0 | Status: SHIPPED | OUTPATIENT
Start: 2025-05-08

## 2025-05-08 RX ORDER — TRAMADOL HYDROCHLORIDE 50 MG/1
50 TABLET ORAL 2 TIMES DAILY PRN
Qty: 60 TABLET | Refills: 2 | Status: SHIPPED | OUTPATIENT
Start: 2025-05-08 | End: 2025-08-06

## 2025-05-08 RX ORDER — OXYCODONE AND ACETAMINOPHEN 5; 325 MG/1; MG/1
1-2 TABLET ORAL EVERY 8 HOURS PRN
Qty: 75 TABLET | Refills: 0 | Status: SHIPPED | OUTPATIENT
Start: 2025-05-08

## 2025-05-22 ENCOUNTER — OFFICE VISIT (OUTPATIENT)
Facility: CLINIC | Age: 60
End: 2025-05-22
Payer: COMMERCIAL

## 2025-05-22 VITALS
HEIGHT: 70 IN | HEART RATE: 92 BPM | TEMPERATURE: 97.3 F | WEIGHT: 186.8 LBS | BODY MASS INDEX: 26.74 KG/M2 | DIASTOLIC BLOOD PRESSURE: 78 MMHG | SYSTOLIC BLOOD PRESSURE: 120 MMHG

## 2025-05-22 DIAGNOSIS — M47.22 CERVICAL SPONDYLOSIS WITH RADICULOPATHY: Primary | ICD-10-CM

## 2025-05-22 PROCEDURE — 99214 OFFICE O/P EST MOD 30 MIN: CPT | Performed by: NEUROLOGICAL SURGERY

## 2025-05-22 ASSESSMENT — PAIN SCALES - GENERAL: PAINLEVEL_OUTOF10: 4

## 2025-05-22 ASSESSMENT — PATIENT HEALTH QUESTIONNAIRE - PHQ9
2. FEELING DOWN, DEPRESSED OR HOPELESS: NOT AT ALL
1. LITTLE INTEREST OR PLEASURE IN DOING THINGS: NOT AT ALL
SUM OF ALL RESPONSES TO PHQ9 QUESTIONS 1 & 2: 0

## 2025-05-22 NOTE — PROGRESS NOTES
[facility] [place]  Department of Neurological Surgery  Established Patient Visit    History of Present Illness  Mikey Jean is a 59 y.o. year old male who presents to the spine clinic in follow up with left-sided neck and shoulder pain.  He states this been going on for about the last year.  He also has numbness radiating to the left shoulder occiput and periauricular area.  Of note, he has a history of prior C4-7 ACDF.  He is also HLA-B27 positive.  He is currently taking methotrexate, hydroxychloroquine and meloxicam.  He recently underwent a C3-4 facet joint injection, which was helpful.  He has not had any recent physical therapy.    BMI: Body mass index is 26.8 kg/m².    14/14 systems reviewed and negative other than what is listed in the history of present illness    Problem List[1]  Medical History[2]  Surgical History[3]  Social History     Tobacco Use    Smoking status: Never    Smokeless tobacco: Never   Substance Use Topics    Alcohol use: Yes     Comment: 10 drinks per week     family history includes Cancer in his mother;  maker in his brother.  Current Medications[4]  Allergies[5]    Physical Examination:  General: well developed, awake/alert/oriented x3, no distress, alert and cooperative  Head/Neck: neck Supple, no apparent injury  ENMT: mucous membranes moist, no apparent injury, no lesions seen  Cardiovascular: no pitting edema, no JVD  Respiratory/Thorax: Normal breath sounds with good chest expansion, thorax symmetric  Skin: warm and dry, no lesions, no rashes    Neurological/Musculoskeletal:    - Posture: normal coronal & sagittal alignment    - Range of motion: full active & passive range of motion    - Muscle Bulk: normal and symmetric in all extremities    - Tenderness palpation over the upper midline cervical spine and paraspinal area on the left    - Motor Strength: 5/5 throughout all extremities    - Sensation: Decreased sensation over the left  shoulder      Results:  I personally reviewed and interpreted the imaging results, which included an MRI of the cervical spine performed on February 6, 2025.  This shows moderate to severe central stenosis at C3-4 as well as severe bilateral foraminal stenosis at this level.    Assessment and Plan:  Mikey Jean is a 59 y.o. year old male who presents to the spine clinic in follow up with left-sided neck and shoulder pain.  He states this been going on for about the last year.  He also has numbness radiating to the left shoulder occiput and periauricular area.  Of note, he has a history of prior C4-7 ACDF.  He is also HLA-B27 positive.  He is currently taking methotrexate, hydroxychloroquine and meloxicam.  He recently underwent a C3-4 facet joint injection, which was helpful.  He has not had any recent physical therapy.  His recent cervical MRI shows moderate to severe central stenosis at C3-4 as well as severe bilateral foraminal stenosis at this level.  I do lengthy discussion with the patient regarding his condition and treatment options.  His symptoms are consistent with facet mediated pain and C4 radiculopathy.  I discussed both further conservative care as well as different surgical approaches.  I discussed proceeding with a C3-4 ACDF.  The patient is interested in surgery, but would like to wait until the fall.  I believe this is reasonable.  I will plan to see the patient back in clinic in August for ongoing treatment discussion.  In the meantime, I am going to order a CT of the cervical spine to assess his prior C4-7 fusion.      I have reviewed all prior documentation and reviewed the electronic medical record since admission. I have personally have reviewed all advanced imaging not just the reports and used my interpretation as documented as the relevant findings. I have reviewed the risks and benefits of all treatment recommendations listed in this note with the patient and family.       The above  clinical summary has been dictated with voice recognition software. It has not been proofread for grammatical errors, typographical mistakes, or other semantic inconsistencies.    Thank you for visiting our office today. It was our pleasure to take part in your healthcare.     Do not hesitate to call with any questions regarding your plan of care after leaving at [phone] M-F 8am-4pm.     To clinicians, thank you very much for this kind referral. It is a privilege to partner with you in the care of your patients. My office would be delighted to assist you with any further consultations or with questions regarding the plan of care outlined. Do not hesitate to call the office or contact me directly.       Sincerely,      Carlo Daley MD PhD  Attending Neurosurgeon  [facility]   of Neurological Surgery  [facility]    [address]  [address]  [address] [place] [address]  [address] [place] [address]    Office: [phone]  Fax: [phone]           [1]   Patient Active Problem List  Diagnosis    Anxiety disorder    Cervical radiculopathy, chronic    Chronic radicular low back pain    Cubital tunnel syndrome on left    Dyslipidemia    Deformity of hand, right    Degenerative scoliosis in adult patient    Erythema    Finger pain    Hand pain    Fusion of joint    Hypoglycemia    Insomnia    Lumbar pain    Lumbar postlaminectomy syndrome    Myalgia    Neuropathy    Osteoarthritis of finger of left hand    Thumb pain    Shoulder pain    Vitamin D deficiency    Vitamin B12 deficiency    Trigger finger of left thumb    Trigger finger    Upper back pain    Cervical spondylosis without myelopathy    Degenerative disc disease, cervical    Displacement of lumbar intervertebral disc without myelopathy    Dizzy  spells    Erectile dysfunction due to arterial insufficiency    Lumbar spondylosis    Lumbosacral spondylosis without myelopathy    BMI 26.0-26.9,adult    Urinary frequency    Prostate cancer screening    Atherosclerosis of native coronary artery of native heart without angina pectoris    Statin intolerance    Agatston CAC score, >400    Ankylosing spondylitis of unspecified sites in spine (Multi)    Low testosterone in male    Other chronic pain    Malaise and fatigue    Contact with and (suspected) exposure to covid-19    History of cervical spinal arthrodesis   [2]   Past Medical History:  Diagnosis Date    Ankylosing spondylitis of site in spine (Multi)     Chronic pain disorder     Contact with and (suspected) exposure to covid-19 01/13/2021    Suspected COVID-19 virus infection    Encounter for general adult medical examination without abnormal findings 05/04/2022    Annual physical exam    Joint pain     Low back pain     Lumbosacral disc disease     Neck pain     Osteoarthritis     Peripheral neuropathy     Spinal stenosis    [3]   Past Surgical History:  Procedure Laterality Date    BACK SURGERY      CT ANGIO CORONARY ART WITH HEARTFLOW IF SCORE >30%  09/15/2023    CT HEART CORONARY ANGIOGRAM 9/15/2023 [id number] CT    EPIDURAL BLOCK INJECTION      LAMINECTOMY      NECK SURGERY      SPINAL FUSION      SPINE SURGERY      TRIGGER POINT INJECTION     [4]   Current Outpatient Medications:     cholecalciferol (Vitamin D-3) 25 MCG (1000 UT) capsule, Take 1 capsule (25 mcg) by mouth once daily. Take as directed, Disp: , Rfl:     cyclobenzaprine (Flexeril) 10 mg tablet, TAKE 1/2-1 TABLET (5-10 MG) BY MOUTH AS NEEDED AT BEDTIME FOR MUSCLE SPASMS, Disp: 90 tablet, Rfl: 3    DULoxetine (Cymbalta) 30 mg DR capsule, TAKE 1 CAPSULE (30 MG) BY MOUTH ONCE DAILY. DO NOT CRUSH OR CHEW., Disp: 90 capsule, Rfl: 1    folic acid (Folvite) 1 mg tablet, Take by mouth once daily., Disp: , Rfl:     gabapentin (Neurontin) 300 mg  capsule, 1 CAPSULE IN AM, 1 CAPSULE IN PM AND 3 CAPSULES AT NIGHT, Disp: 150 capsule, Rfl: 2    hydroxychloroquine (Plaquenil) 200 mg tablet, Take 1 tablet (200 mg) by mouth 2 times a day., Disp: , Rfl:     ixekizumab (Taltz Autoinjector) 80 mg/mL injection, Inject 1 mL (80 mg) under the skin., Disp: , Rfl:     MAGNESIUM GLYCINATE ORAL, Take by mouth., Disp: , Rfl:     meloxicam (Mobic) 15 mg tablet, TAKE 1 TABLET BY MOUTH EVERY DAY, Disp: 90 tablet, Rfl: 1    methotrexate (Trexall) 2.5 mg tablet, Take 6 tablets (15 mg total) by mouth 1 (one) time per week., Disp: , Rfl:     multivitamin (Multiple Vitamins) tablet, Take 1 tablet by mouth once daily., Disp: , Rfl:     oxyCODONE-acetaminophen (Percocet) 5-325 mg tablet, Take 1-2 tablets by mouth every 8 hours if needed for severe pain (7 - 10)., Disp: 75 tablet, Rfl: 0    pantoprazole (ProtoNix) 40 mg EC tablet, TAKE 1 TABLET (40 MG) BY MOUTH DAILY DO NOT CRUSH, CHEW, ORSPLIT, Disp: 30 tablet, Rfl: 3    rosuvastatin (Crestor) 10 mg tablet, Take 1 tablet (10 mg) by mouth once daily., Disp: 90 tablet, Rfl: 3    sildenafil (Viagra) 100 mg tablet, 1/2 to 1 tab prn relations, Disp: 12 tablet, Rfl: 11    testosterone 20.25 mg/1.25 gram (1.62 %) gel in metered-dose pump, PLACE 1 PUMP ON EACH SHOULDER DAILY FOR TOTAL OF 2 PUMPS PER DAY, Disp: 75 g, Rfl: 1    testosterone 20.25 mg/1.25 gram (1.62 %) gel in metered-dose pump, PLACE 1 PUMP ON EACH SHOULDER DAILY FOR TOTAL OF 2 PUMPS PER DAY, Disp: 75 g, Rfl: 0    traMADol (Ultram) 50 mg tablet, Take 1 tablet (50 mg) by mouth 2 times a day as needed for severe pain (7 - 10)., Disp: 60 tablet, Rfl: 2  [5] No Known Allergies

## 2025-06-06 DIAGNOSIS — M54.50 LUMBAR PAIN: ICD-10-CM

## 2025-06-06 DIAGNOSIS — R79.89 LOW TESTOSTERONE IN MALE: ICD-10-CM

## 2025-06-06 DIAGNOSIS — M54.9 UPPER BACK PAIN: ICD-10-CM

## 2025-06-06 DIAGNOSIS — M54.16 CHRONIC RADICULAR LOW BACK PAIN: ICD-10-CM

## 2025-06-06 DIAGNOSIS — G89.29 CHRONIC RADICULAR LOW BACK PAIN: ICD-10-CM

## 2025-06-06 NOTE — TELEPHONE ENCOUNTER
Patient scheduled an appointment for 25. Requesting short supplies on 2 RXS for Tramadol 50 mg and Percocet 5-325 mg    Rx Controlled Refill Request Telephone Encounter    Name: Mikey Jean  :  1965    Medication Name:   Tramadol  Dose (Optional):   50 mg  Quantity (Optional):     Directions (Optional):   Take 1 tablet (50 mg) by mouth 2 times a day as needed for severe pain (7-10)    Medication Name:   Percocet   Dose (Optional):   5-325 mg  Quantity (Optional):     Directions (Optional):  Take 1-2 tablets by mouth every 8 hours if needed for severe pain (7-10)    ALLERGIES:    nkda     LAST DRUG SCREEN/MED CONTRACT:     7/3/24    Specific Pharmacy location:    [facility]     Date of last appointment:    3/5/25  Date of next appointment:    25    Best number to reach patient:    [phone]

## 2025-06-09 RX ORDER — TESTOSTERONE 20.25 MG/1.25G
GEL TOPICAL
Qty: 75 G | Refills: 0 | Status: SHIPPED | OUTPATIENT
Start: 2025-06-09

## 2025-06-09 RX ORDER — OXYCODONE AND ACETAMINOPHEN 5; 325 MG/1; MG/1
1-2 TABLET ORAL EVERY 8 HOURS PRN
Qty: 75 TABLET | Refills: 0 | Status: SHIPPED | OUTPATIENT
Start: 2025-06-09

## 2025-06-09 RX ORDER — TRAMADOL HYDROCHLORIDE 50 MG/1
50 TABLET, FILM COATED ORAL 2 TIMES DAILY PRN
Qty: 60 TABLET | Refills: 0 | Status: SHIPPED | OUTPATIENT
Start: 2025-06-09 | End: 2025-09-07

## 2025-06-11 NOTE — PROGRESS NOTES
Subjective   Patient ID: Mikey Jean is a 59 y.o. male who presents for Lumbar Pain and Follow-up.    HPI  Patient presents today for a follow-up on Lumbar pain. Is taking Tramadol/Percocet. States he has no concerns with this medication. Rates the pain a between 4-9/10 over the past 7 days. Reports that the medication gives 50% pain control/relief. [place]AR[place] reviewed today. Controlled substance contract signed 06/12/25. Last took tramadol this morning and takes Percocet at night.    Patient has left-sided neck and shoulder pain and states that his left is being extremely bothersome. He is also having numbness in the left shoulder going up to the left angle of the jaw. Ongoing x last year. He is taking gabapentin 300 mg in the morning, 300 mg in the afternoon, and 900 mg at night. He is also taking Cymbalta 30 mg once daily. Denies chest pain, SOB with activities.    Patient stopped Plaquenil after he felt he was turning red. We discussed that the redness could also be a side-effect from Cymbalta.     Patient would like to discuss his ongoing nasal drainage and congestion. He inquires if he can use Afrin nasal spray, and we told him that this is not an ideal medication he should use.    Patient is taking Viagra for his ED and is also on Testosterone gel. We informed him that Cymbalta also has possible side-effect of retrograde ejaculation.      Review of systems  ; Patient seen today for exam denies any problems with headaches or vision, denies any shortness of breath chest pain nausea or vomiting, no black stool no blood in the stool no heartburn type symptoms denies any problems with constipation or diarrhea, and no dysuria-type symptoms    The patient's allergies medications were reviewed with them today    The patient's social family and surgical history or also reviewed here today, along with her past medical history.     Objective     Alert and active in  no acute distress  HEENT TMs clear oropharynx negative  "nares clear no drainage noted neck supple  With no adenopathy   Heart regular rate and rhythm without murmur and no carotid bruits  Lungs- clear to auscultation bilaterally, no wheeze or rhonchi noted  Thyroid -negative masses or nodularity  Abdomen- soft times four quadrants , bowel sounds positive no masses or organomegaly, negative tenderness guarding or rebound    skin -no lesions noted      /60 (BP Location: Right arm, Patient Position: Sitting, BP Cuff Size: Adult)   Pulse 100   Temp 36.4 °C (97.5 °F) (Temporal)   Resp 18   Ht 1.778 m (5' 10\")   Wt 84.6 kg (186 lb 9.6 oz)   SpO2 98%   BMI 26.77 kg/m²         Assessment/Plan   Problem List Items Addressed This Visit       Dyslipidemia    Lumbar pain    Vitamin B12 deficiency - Primary    Erectile dysfunction due to arterial insufficiency    BMI 26.0-26.9,adult     Other Visit Diagnoses         Therapeutic drug monitoring        Relevant Orders    Opiate/Opioid/Benzo Prescription Compliance      Nasal drainage        Relevant Medications    ipratropium (Atrovent) 21 mcg (0.03 %) nasal spray            CSA 6/12/25.  Continue Tramadol and Percocet as needed.    Increase Gabapentin 300 mg to 1 tab AM, 2 tabs PM, and 3 tabs at night.    Continue all other medications at the prescribed dose.     Prescribed Atrovent 0.03% nasal spray to be used 2 sprays into each nostrils every 12 hours for congestion.    If anything worsens or changes please call us at once, follow up in the office as planned,       Scribe Attestation  By signing my name below, IChristina Scribe   attest that this documentation has been prepared under the direction and in the presence of Luis Miguel Jasmine DO.    All medical record entries made by the Scribe were at my direction and personally dictated by me.   I have reviewed the chart and agree that the record accurately reflects my personal performance of the history, physical exam, discussion, and plan.  "

## 2025-06-12 ENCOUNTER — APPOINTMENT (OUTPATIENT)
Dept: PRIMARY CARE | Facility: CLINIC | Age: 60
End: 2025-06-12
Payer: COMMERCIAL

## 2025-06-12 VITALS
WEIGHT: 186.6 LBS | HEIGHT: 70 IN | OXYGEN SATURATION: 98 % | BODY MASS INDEX: 26.71 KG/M2 | TEMPERATURE: 97.5 F | HEART RATE: 100 BPM | DIASTOLIC BLOOD PRESSURE: 60 MMHG | SYSTOLIC BLOOD PRESSURE: 120 MMHG | RESPIRATION RATE: 18 BRPM

## 2025-06-12 DIAGNOSIS — M54.50 LUMBAR PAIN: ICD-10-CM

## 2025-06-12 DIAGNOSIS — N52.01 ERECTILE DYSFUNCTION DUE TO ARTERIAL INSUFFICIENCY: ICD-10-CM

## 2025-06-12 DIAGNOSIS — J34.89 NASAL DRAINAGE: ICD-10-CM

## 2025-06-12 DIAGNOSIS — E53.8 VITAMIN B12 DEFICIENCY: Primary | ICD-10-CM

## 2025-06-12 DIAGNOSIS — E78.5 DYSLIPIDEMIA: ICD-10-CM

## 2025-06-12 DIAGNOSIS — Z51.81 THERAPEUTIC DRUG MONITORING: ICD-10-CM

## 2025-06-12 PROCEDURE — 99214 OFFICE O/P EST MOD 30 MIN: CPT | Performed by: FAMILY MEDICINE

## 2025-06-12 PROCEDURE — 1036F TOBACCO NON-USER: CPT | Performed by: FAMILY MEDICINE

## 2025-06-12 PROCEDURE — 3008F BODY MASS INDEX DOCD: CPT | Performed by: FAMILY MEDICINE

## 2025-06-12 RX ORDER — IPRATROPIUM BROMIDE 21 UG/1
2 SPRAY, METERED NASAL EVERY 12 HOURS
Qty: 30 ML | Refills: 12 | Status: SHIPPED | OUTPATIENT
Start: 2025-06-12 | End: 2026-06-12

## 2025-06-12 ASSESSMENT — ENCOUNTER SYMPTOMS: DEPRESSION: 0

## 2025-06-15 LAB
1OH-MIDAZOLAM UR-MCNC: NEGATIVE NG/ML
7AMINOCLONAZEPAM UR-MCNC: NEGATIVE NG/ML
A-OH ALPRAZ UR-MCNC: NEGATIVE NG/ML
A-OH-TRIAZOLAM UR-MCNC: NEGATIVE NG/ML
AMPHETAMINES UR QL: NEGATIVE NG/ML
BARBITURATES UR QL: NEGATIVE NG/ML
BZE UR QL: NEGATIVE NG/ML
CODEINE UR-MCNC: NEGATIVE NG/ML
CREAT UR-MCNC: 73.9 MG/DL
DRUG SCREEN COMMENT UR-IMP: ABNORMAL
EDDP UR-MCNC: NEGATIVE NG/ML
FENTANYL UR-MCNC: NEGATIVE NG/ML
HYDROCODONE UR-MCNC: NEGATIVE NG/ML
HYDROMORPHONE UR-MCNC: NEGATIVE NG/ML
LORAZEPAM UR-MCNC: NEGATIVE NG/ML
METHADONE UR-MCNC: NEGATIVE NG/ML
MORPHINE UR-MCNC: NEGATIVE NG/ML
NORDIAZEPAM UR-MCNC: NEGATIVE NG/ML
NORFENTANYL UR-MCNC: NEGATIVE NG/ML
NORHYDROCODONE UR CFM-MCNC: NEGATIVE NG/ML
NOROXYCODONE UR CFM-MCNC: 1370 NG/ML
NORTRAMADOL UR-MCNC: 2082 NG/ML
OH-ETHYLFLURAZ UR-MCNC: NEGATIVE NG/ML
OXAZEPAM UR-MCNC: NEGATIVE NG/ML
OXIDANTS UR QL: NEGATIVE MCG/ML
OXYCODONE UR CFM-MCNC: 338 NG/ML
OXYMORPHONE UR CFM-MCNC: 398 NG/ML
PCP UR QL: NEGATIVE NG/ML
PH UR: 7 [PH] (ref 4.5–9)
QUEST 6 ACETYLMORPHINE: NEGATIVE NG/ML
QUEST NOTES AND COMMENTS: ABNORMAL
QUEST ZOLPIDEM: NEGATIVE NG/ML
TEMAZEPAM UR-MCNC: NEGATIVE NG/ML
THC UR QL: NEGATIVE NG/ML
TRAMADOL UR-MCNC: 4080 NG/ML
ZOLPIDEM PHENYL-4-CARB UR CFM-MCNC: NEGATIVE NG/ML

## 2025-06-17 DIAGNOSIS — J01.00 ACUTE MAXILLARY SINUSITIS, RECURRENCE NOT SPECIFIED: Primary | ICD-10-CM

## 2025-06-17 DIAGNOSIS — G62.9 NEUROPATHY: ICD-10-CM

## 2025-06-18 RX ORDER — GABAPENTIN 300 MG/1
CAPSULE ORAL
Qty: 150 CAPSULE | Refills: 2 | Status: SHIPPED | OUTPATIENT
Start: 2025-06-18

## 2025-06-18 RX ORDER — CEFDINIR 300 MG/1
600 CAPSULE ORAL DAILY
Qty: 20 CAPSULE | Refills: 0 | Status: SHIPPED | OUTPATIENT
Start: 2025-06-18 | End: 2025-06-28

## 2025-06-18 NOTE — TELEPHONE ENCOUNTER
Mikey SPARROW Do [id number] [facility] Clinical Support Staff (supporting Luis Miguel Jasmine DO) (Selected Message)        6/17/25 10:49 AM  Good morning.   It would appear that my congestion has worsened the last few days. Heavy, brownish discharge from lungs. I know in the past we threw some antibiotics at it and it helped clear up. I took the second dose of my Humira as well, so they are likley related?     Thanks  Mikey

## 2025-06-18 NOTE — TELEPHONE ENCOUNTER
Luis Miguel Jasmine, DO to Do [id number] [facility] Clerical (Selected Message)        6/18/25  4:53 PM  We sent his gabapentin and antibiotic to [facility]

## 2025-06-23 ENCOUNTER — APPOINTMENT (OUTPATIENT)
Dept: RADIOLOGY | Facility: CLINIC | Age: 60
End: 2025-06-23
Payer: COMMERCIAL

## 2025-06-23 DIAGNOSIS — M47.22 CERVICAL SPONDYLOSIS WITH RADICULOPATHY: ICD-10-CM

## 2025-06-23 PROCEDURE — 72125 CT NECK SPINE W/O DYE: CPT

## 2025-06-23 PROCEDURE — 72125 CT NECK SPINE W/O DYE: CPT | Performed by: RADIOLOGY

## 2025-07-08 DIAGNOSIS — M54.50 LUMBAR PAIN: ICD-10-CM

## 2025-07-08 DIAGNOSIS — G89.29 CHRONIC RADICULAR LOW BACK PAIN: ICD-10-CM

## 2025-07-08 DIAGNOSIS — M54.16 CHRONIC RADICULAR LOW BACK PAIN: ICD-10-CM

## 2025-07-08 DIAGNOSIS — M54.9 UPPER BACK PAIN: ICD-10-CM

## 2025-07-08 NOTE — TELEPHONE ENCOUNTER
Patient is calling to request 2 RX refills for his Tramadol 50 mg and Percocet 5-325 mg  Aware you are out of the office today     Rx Controlled Refill Request Telephone Encounter    Name: Mikey Jean  :  1965    Medication Name:   Tramadol  Dose (Optional):   50 mg  Quantity (Optional):   60 tablet (30 day supply)  Directions (Optional):   Take 1 tablet (50 mg) by mouth 2 times a day as needed for severe pain (7-10)    Medication Name:   Percocet  Dose (Optional):   5-325 mg  Quantity (Optional):   75 tablet   Directions (Optional):   Take 1-2 tablets by mouth every 8 hours if needed for severe pain (7-10)    ALLERGIES:    nkda     LAST DRUG SCREEN/MED CONTRACT:     25    Specific Pharmacy location:    [facility]    Date of last appointment:    25  Date of next appointment:    none     Best number to reach patient:    [phone]

## 2025-07-09 DIAGNOSIS — R79.89 LOW TESTOSTERONE IN MALE: ICD-10-CM

## 2025-07-09 RX ORDER — TRAMADOL HYDROCHLORIDE 50 MG/1
50 TABLET, FILM COATED ORAL 2 TIMES DAILY PRN
Qty: 60 TABLET | Refills: 0 | Status: SHIPPED | OUTPATIENT
Start: 2025-07-09 | End: 2025-10-07

## 2025-07-09 RX ORDER — OXYCODONE AND ACETAMINOPHEN 5; 325 MG/1; MG/1
1-2 TABLET ORAL EVERY 8 HOURS PRN
Qty: 75 TABLET | Refills: 0 | Status: SHIPPED | OUTPATIENT
Start: 2025-07-09

## 2025-07-10 RX ORDER — TESTOSTERONE 20.25 MG/1.25G
GEL TOPICAL
Qty: 75 G | Refills: 0 | Status: SHIPPED | OUTPATIENT
Start: 2025-07-10

## 2025-07-21 ENCOUNTER — APPOINTMENT (OUTPATIENT)
Dept: PRIMARY CARE | Facility: CLINIC | Age: 60
End: 2025-07-21
Payer: COMMERCIAL

## 2025-08-05 ENCOUNTER — TELEPHONE (OUTPATIENT)
Dept: PRIMARY CARE | Facility: CLINIC | Age: 60
End: 2025-08-05

## 2025-08-05 DIAGNOSIS — J01.00 ACUTE MAXILLARY SINUSITIS, RECURRENCE NOT SPECIFIED: Primary | ICD-10-CM

## 2025-08-06 ENCOUNTER — PATIENT MESSAGE (OUTPATIENT)
Dept: PRIMARY CARE | Facility: CLINIC | Age: 60
End: 2025-08-06
Payer: COMMERCIAL

## 2025-08-06 DIAGNOSIS — R79.89 LOW TESTOSTERONE IN MALE: ICD-10-CM

## 2025-08-06 DIAGNOSIS — M54.50 LUMBAR PAIN: ICD-10-CM

## 2025-08-06 DIAGNOSIS — M54.16 CHRONIC RADICULAR LOW BACK PAIN: ICD-10-CM

## 2025-08-06 DIAGNOSIS — M54.9 UPPER BACK PAIN: ICD-10-CM

## 2025-08-06 DIAGNOSIS — G89.29 CHRONIC RADICULAR LOW BACK PAIN: ICD-10-CM

## 2025-08-06 RX ORDER — CEFDINIR 300 MG/1
600 CAPSULE ORAL DAILY
Qty: 20 CAPSULE | Refills: 0 | Status: SHIPPED | OUTPATIENT
Start: 2025-08-06 | End: 2025-08-16

## 2025-08-07 ENCOUNTER — OFFICE VISIT (OUTPATIENT)
Facility: CLINIC | Age: 60
End: 2025-08-07
Payer: COMMERCIAL

## 2025-08-07 VITALS
HEART RATE: 97 BPM | TEMPERATURE: 98.3 F | HEIGHT: 70 IN | WEIGHT: 185.5 LBS | SYSTOLIC BLOOD PRESSURE: 138 MMHG | DIASTOLIC BLOOD PRESSURE: 72 MMHG | BODY MASS INDEX: 26.56 KG/M2

## 2025-08-07 DIAGNOSIS — R79.89 LOW TESTOSTERONE IN MALE: ICD-10-CM

## 2025-08-07 DIAGNOSIS — M47.22 CERVICAL SPONDYLOSIS WITH RADICULOPATHY: Primary | ICD-10-CM

## 2025-08-07 DIAGNOSIS — M54.9 UPPER BACK PAIN: ICD-10-CM

## 2025-08-07 DIAGNOSIS — M54.16 CHRONIC RADICULAR LOW BACK PAIN: ICD-10-CM

## 2025-08-07 DIAGNOSIS — G89.29 CHRONIC RADICULAR LOW BACK PAIN: ICD-10-CM

## 2025-08-07 DIAGNOSIS — M54.50 LUMBAR PAIN: ICD-10-CM

## 2025-08-07 PROCEDURE — 99214 OFFICE O/P EST MOD 30 MIN: CPT | Performed by: NEUROLOGICAL SURGERY

## 2025-08-07 PROCEDURE — 99213 OFFICE O/P EST LOW 20 MIN: CPT

## 2025-08-07 PROCEDURE — 1036F TOBACCO NON-USER: CPT | Performed by: NEUROLOGICAL SURGERY

## 2025-08-07 PROCEDURE — 3008F BODY MASS INDEX DOCD: CPT | Performed by: NEUROLOGICAL SURGERY

## 2025-08-07 RX ORDER — TESTOSTERONE 20.25 MG/1.25G
GEL TOPICAL
Qty: 75 G | Refills: 1 | Status: SHIPPED | OUTPATIENT
Start: 2025-08-07

## 2025-08-07 RX ORDER — TRAMADOL HYDROCHLORIDE 50 MG/1
50 TABLET, FILM COATED ORAL 2 TIMES DAILY PRN
Qty: 60 TABLET | Refills: 0 | Status: SHIPPED | OUTPATIENT
Start: 2025-08-07 | End: 2025-11-05

## 2025-08-07 RX ORDER — OXYCODONE AND ACETAMINOPHEN 5; 325 MG/1; MG/1
1-2 TABLET ORAL EVERY 8 HOURS PRN
Qty: 75 TABLET | Refills: 0 | OUTPATIENT
Start: 2025-08-07

## 2025-08-07 RX ORDER — TRAMADOL HYDROCHLORIDE 50 MG/1
50 TABLET, FILM COATED ORAL 2 TIMES DAILY PRN
Qty: 60 TABLET | Refills: 0 | OUTPATIENT
Start: 2025-08-07 | End: 2025-11-05

## 2025-08-07 RX ORDER — TESTOSTERONE 20.25 MG/1.25G
GEL TOPICAL
Qty: 75 G | Refills: 1 | OUTPATIENT
Start: 2025-08-07

## 2025-08-07 RX ORDER — OXYCODONE AND ACETAMINOPHEN 5; 325 MG/1; MG/1
1-2 TABLET ORAL EVERY 8 HOURS PRN
Qty: 75 TABLET | Refills: 0 | Status: SHIPPED | OUTPATIENT
Start: 2025-08-07

## 2025-08-07 ASSESSMENT — PAIN SCALES - GENERAL: PAINLEVEL_OUTOF10: 5

## 2025-08-07 NOTE — TELEPHONE ENCOUNTER
CONTROLLED RX REQUEST     Medication pended   Last office visit 6/12/2025   NEXT OFFICE VISIT Visit date not found   CSA, uds --- 6/12/25         Mikey SPARROW Do [id number] [place] [address] Clinical Support Staff (supporting Luis Miguel Jasmine DO) (Selected Message)        8/6/25 10:17 AM  My apologies as I know you are all busy. I should have done it at the same time as my other message.  I will be needing refills at the end of the week for Tramadol and Percocet. My testosterone is also due.  Thank you      Mikey

## 2025-08-07 NOTE — TELEPHONE ENCOUNTER
Recent Visits  Date Type Provider Dept   06/12/25 Office Visit Luis Miguel Jasmine, DO Do [id number] [facility]   03/05/25 Office Visit Luis Miguel Jasmine, DO Do [id number] [facility]   11/21/24 Office Visit Luis Miguel Jasmine, DO Do [id number] [facility]   09/13/24 Office Visit Luis Miguel Jasmine, DO Do [id number] [facility]   Showing recent visits within past 365 days and meeting all other requirements  Future Appointments  No visits were found meeting these conditions.  Showing future appointments within next 90 days and meeting all other requirements    Mikey SPARROW Do [id number] [facility] Clinical Support Staff (supporting Luis Miguel Jasmine DO)Yesterday (10:17 AM)       My apologies as I know you are all busy. I should have done it at the same time as my other message.  I will be needing refills at the end of the week for Tramadol and Percocet. My testosterone is also due.  Thank you      Mikey Moreno questionnaires available.

## 2025-08-07 NOTE — PROGRESS NOTES
[facility]  Department of Neurological Surgery  Established Patient Visit    History of Present Illness  Mikey Jean is a 59 y.o. year old male who presents to the spine clinic in follow up with worsening left-sided neck and shoulder pain.  He also has radiating numbness in the left shoulder, occiput and periauricular area.  He had history of a prior C4-6 ACDF.  I performed a C6-7 ACDF in September 2022.  He also has a history of being HLA-B27 positive with polyarthritis, for which she takes methotrexate, hydroxychloroquine and meloxicam.  His prior MRI from February 2025 showed adjacent segment disease at C3-4 resulting in moderate to severe central stenosis as well as severe bilateral foraminal stenosis.  Prior treatments include a C3 4 injection.  He has not had any recent physical therapy.  When I last saw him in May 2025, I ordered a CT of the cervical spine to evaluate his prior fusion.  He is here today to go over the results of that study and for ongoing treatment discussion.    BMI: Body mass index is 26.62 kg/m².    14/14 systems reviewed and negative other than what is listed in the history of present illness    Problem List[1]  Medical History[2]  Surgical History[3]  Social History     Tobacco Use    Smoking status: Never    Smokeless tobacco: Never   Substance Use Topics    Alcohol use: Yes     Comment: 10 drinks per week     family history includes Cancer in his mother;  maker in his brother.  Current Medications[4]  Allergies[5]    Physical Examination:  General: well developed, awake/alert/oriented x3, no distress, alert and cooperative  Head/Neck: neck Supple, no apparent injury  ENMT: mucous membranes moist, no apparent injury, no lesions seen  Cardiovascular: no pitting edema, no JVD  Respiratory/Thorax: Normal breath sounds with good chest expansion, thorax symmetric  Skin: warm and dry, no lesions, no rashes     Neurological/Musculoskeletal:     - Posture: normal  coronal & sagittal alignment     - Range of motion: full active & passive range of motion     - Muscle Bulk: normal and symmetric in all extremities     - Tenderness palpation over the upper midline cervical spine and paraspinal area on the left     - Motor Strength: 5/5 throughout all extremities     - Sensation: Decreased sensation over the left shoulder    - Reflexes: negative Mina's sign      Results:  I personally reviewed and interpreted the imaging results, which included an MRI of the cervical spine performed on February 6, 2025.  This shows moderate to severe central stenosis at C3-4 as well as severe bilateral foraminal stenosis at this level.     I also reviewed and interpreted the CT of the cervical spine performed on June 23, 2025.  This shows solid anterior interbody fusion at C4-7.      Assessment and Plan:  Mikey Jean is a 59 y.o. year old male who presents to the spine clinic in follow up with worsening left-sided neck and shoulder pain.  He also has radiating numbness in the left shoulder, occiput and periauricular area.  He had history of a prior C4-6 ACDF.  I performed a C6-7 ACDF in September 2022.  He also has a history of being HLA-B27 positive with polyarthritis, for which she takes methotrexate, hydroxychloroquine and meloxicam.  His prior MRI from February 2025 showed adjacent segment disease at C3-4 resulting in moderate to severe central stenosis as well as severe bilateral foraminal stenosis.  Prior treatments include a C3 4 injection.  He has not had any recent physical therapy.  When I last saw him in May 2025, I ordered a CT of the cervical spine to evaluate his prior fusion.  He is here today to go over the results of that study and for ongoing treatment discussion.  I had a lengthy discussion with the patient regarding their condition and treatment options. I discussed both further conservative care as well as different surgical approaches.  I discussed proceeding with a C3-4  ACDF using a stand-alone cage.  I went over the risks associated with surgery, including infection, bleeding, neurologic injury, spinal fluid leak, and the need for further procedures. All of the patient's questions were answered and they have elected to proceed with surgery.        I have reviewed all prior documentation and reviewed the electronic medical record since admission. I have personally have reviewed all advanced imaging not just the reports and used my interpretation as documented as the relevant findings. I have reviewed the risks and benefits of all treatment recommendations listed in this note with the patient and family.       The above clinical summary has been dictated with voice recognition software. It has not been proofread for grammatical errors, typographical mistakes, or other semantic inconsistencies.    Thank you for visiting our office today. It was our pleasure to take part in your healthcare.     Do not hesitate to call with any questions regarding your plan of care after leaving at [phone] M-F 8am-4pm.     To clinicians, thank you very much for this kind referral. It is a privilege to partner with you in the care of your patients. My office would be delighted to assist you with any further consultations or with questions regarding the plan of care outlined. Do not hesitate to call the office or contact me directly.       Sincerely,      Carlo Daley MD PhD  Attending Neurosurgeon  [facility]   of Neurological Surgery  [facility]    [address]  [place] [address]    [facility]  [address] [place] [address]  [address] [place] [id number]    Office: [phone]  Fax: [phone]           [1]   Patient Active Problem List  Diagnosis    Anxiety disorder    Cervical radiculopathy, chronic     Chronic radicular low back pain    Cubital tunnel syndrome on left    Dyslipidemia    Deformity of hand, right    Degenerative scoliosis in adult patient    Erythema    Finger pain    Hand pain    Fusion of joint    Hypoglycemia    Insomnia    Lumbar pain    Lumbar postlaminectomy syndrome    Myalgia    Neuropathy    Osteoarthritis of finger of left hand    Thumb pain    Shoulder pain    Vitamin D deficiency    Vitamin B12 deficiency    Trigger finger of left thumb    Trigger finger    Upper back pain    Cervical spondylosis without myelopathy    Degenerative disc disease, cervical    Displacement of lumbar intervertebral disc without myelopathy    Dizzy spells    Erectile dysfunction due to arterial insufficiency    Lumbar spondylosis    Lumbosacral spondylosis without myelopathy    BMI 26.0-26.9,adult    Urinary frequency    Prostate cancer screening    Atherosclerosis of native coronary artery of native heart without angina pectoris    Statin intolerance    Agatston CAC score, >400    Ankylosing spondylitis of unspecified sites in spine (Multi)    Low testosterone in male    Other chronic pain    Malaise and fatigue    Contact with and (suspected) exposure to covid-19    History of cervical spinal arthrodesis   [2]   Past Medical History:  Diagnosis Date    Ankylosing spondylitis of site in spine (Multi)     Chronic pain disorder     Contact with and (suspected) exposure to covid-19 01/13/2021    Suspected COVID-19 virus infection    Encounter for general adult medical examination without abnormal findings 05/04/2022    Annual physical exam    Joint pain     Low back pain     Lumbosacral disc disease     Neck pain     Osteoarthritis     Peripheral neuropathy     Spinal stenosis    [3]   Past Surgical History:  Procedure Laterality Date    BACK SURGERY      CT ANGIO CORONARY ART WITH HEARTFLOW IF SCORE >30%  09/15/2023    CT HEART CORONARY ANGIOGRAM 9/15/2023 [id number]    EPIDURAL BLOCK INJECTION       LAMINECTOMY      NECK SURGERY      SPINAL FUSION      SPINE SURGERY      TRIGGER POINT INJECTION     [4]   Current Outpatient Medications:     adalimumab (HUMIRA,CF, PEDI CROHNS STARTER SUBQ), Inject 40 mg under the skin., Disp: , Rfl:     cefdinir (Omnicef) 300 mg capsule, Take 2 capsules (600 mg) by mouth once daily for 10 days., Disp: 20 capsule, Rfl: 0    cholecalciferol (Vitamin D-3) 25 MCG (1000 UT) capsule, Take 1 capsule (25 mcg) by mouth once daily. Take as directed, Disp: , Rfl:     cyclobenzaprine (Flexeril) 10 mg tablet, TAKE 1/2-1 TABLET (5-10 MG) BY MOUTH AS NEEDED AT BEDTIME FOR MUSCLE SPASMS, Disp: 90 tablet, Rfl: 3    DULoxetine (Cymbalta) 30 mg DR capsule, TAKE 1 CAPSULE (30 MG) BY MOUTH ONCE DAILY. DO NOT CRUSH OR CHEW., Disp: 90 capsule, Rfl: 1    folic acid (Folvite) 1 mg tablet, Take by mouth once daily., Disp: , Rfl:     gabapentin (Neurontin) 300 mg capsule, TAKE 1 CAPSULE BY MOUTH IN AM, 1 CAPSULE IN PM AND 3 CAPSULES AT NIGHT, Disp: 150 capsule, Rfl: 2    hydroxychloroquine (Plaquenil) 200 mg tablet, Take 1 tablet (200 mg) by mouth 2 times a day., Disp: , Rfl:     ipratropium (Atrovent) 21 mcg (0.03 %) nasal spray, Administer 2 sprays into each nostril every 12 hours., Disp: 30 mL, Rfl: 12    MAGNESIUM GLYCINATE ORAL, Take by mouth., Disp: , Rfl:     meloxicam (Mobic) 15 mg tablet, TAKE 1 TABLET BY MOUTH EVERY DAY, Disp: 90 tablet, Rfl: 1    methotrexate (Trexall) 2.5 mg tablet, Take 6 tablets (15 mg total) by mouth 1 (one) time per week., Disp: , Rfl:     multivitamin (Multiple Vitamins) tablet, Take 1 tablet by mouth once daily., Disp: , Rfl:     rosuvastatin (Crestor) 10 mg tablet, Take 1 tablet (10 mg) by mouth once daily., Disp: 90 tablet, Rfl: 3    sildenafil (Viagra) 100 mg tablet, 1/2 to 1 tab prn relations, Disp: 12 tablet, Rfl: 11    oxyCODONE-acetaminophen (Percocet) 5-325 mg tablet, Take 1-2 tablets by mouth every 8 hours if needed for severe pain (7 - 10)., Disp: 75 tablet,  Rfl: 0    testosterone 20.25 mg/1.25 gram (1.62 %) gel in metered-dose pump, Place 1 pump on each shoulder daily for total of 2 pumps per day, Disp: 75 g, Rfl: 1    traMADol (Ultram) 50 mg tablet, Take 1 tablet (50 mg) by mouth 2 times a day as needed for severe pain (7 - 10)., Disp: 60 tablet, Rfl: 0  [5] No Known Allergies

## 2025-08-08 ENCOUNTER — TELEPHONE (OUTPATIENT)
Dept: CARDIOLOGY | Facility: CLINIC | Age: 60
End: 2025-08-08
Payer: COMMERCIAL

## 2025-09-03 ENCOUNTER — OFFICE VISIT (OUTPATIENT)
Dept: PRIMARY CARE | Facility: CLINIC | Age: 60
End: 2025-09-03
Payer: COMMERCIAL

## 2025-09-03 VITALS
SYSTOLIC BLOOD PRESSURE: 120 MMHG | HEIGHT: 70 IN | BODY MASS INDEX: 26.48 KG/M2 | OXYGEN SATURATION: 99 % | TEMPERATURE: 97.8 F | WEIGHT: 185 LBS | DIASTOLIC BLOOD PRESSURE: 80 MMHG | HEART RATE: 91 BPM

## 2025-09-03 DIAGNOSIS — M54.16 CHRONIC RADICULAR LOW BACK PAIN: ICD-10-CM

## 2025-09-03 DIAGNOSIS — G89.29 CHRONIC RADICULAR LOW BACK PAIN: ICD-10-CM

## 2025-09-03 DIAGNOSIS — M54.50 LUMBAR PAIN: ICD-10-CM

## 2025-09-03 DIAGNOSIS — M54.2 NECK PAIN ON LEFT SIDE: ICD-10-CM

## 2025-09-03 DIAGNOSIS — J32.1 CHRONIC FRONTAL SINUSITIS: Primary | ICD-10-CM

## 2025-09-03 DIAGNOSIS — M54.9 UPPER BACK PAIN: ICD-10-CM

## 2025-09-03 PROCEDURE — 99214 OFFICE O/P EST MOD 30 MIN: CPT | Performed by: FAMILY MEDICINE

## 2025-09-03 PROCEDURE — 3008F BODY MASS INDEX DOCD: CPT | Performed by: FAMILY MEDICINE

## 2025-09-03 PROCEDURE — 1036F TOBACCO NON-USER: CPT | Performed by: FAMILY MEDICINE

## 2025-09-03 RX ORDER — TRAMADOL HYDROCHLORIDE 50 MG/1
50 TABLET, FILM COATED ORAL 2 TIMES DAILY PRN
Qty: 60 TABLET | Refills: 0 | Status: CANCELLED | OUTPATIENT
Start: 2025-09-03 | End: 2025-12-02

## 2025-09-03 RX ORDER — OXYCODONE AND ACETAMINOPHEN 5; 325 MG/1; MG/1
1-2 TABLET ORAL EVERY 8 HOURS PRN
Qty: 75 TABLET | Refills: 0 | Status: CANCELLED | OUTPATIENT
Start: 2025-09-03

## 2025-09-03 RX ORDER — MOXIFLOXACIN HYDROCHLORIDE 400 MG/1
400 TABLET ORAL DAILY
Qty: 14 TABLET | Refills: 0 | Status: SHIPPED | OUTPATIENT
Start: 2025-09-03 | End: 2025-09-17

## 2025-09-03 RX ORDER — TRAMADOL HYDROCHLORIDE 50 MG/1
50 TABLET, FILM COATED ORAL 2 TIMES DAILY PRN
Qty: 60 TABLET | Refills: 0 | Status: SHIPPED | OUTPATIENT
Start: 2025-09-03 | End: 2025-12-02

## 2025-09-03 RX ORDER — OXYCODONE AND ACETAMINOPHEN 5; 325 MG/1; MG/1
1-2 TABLET ORAL EVERY 8 HOURS PRN
Qty: 75 TABLET | Refills: 0 | Status: SHIPPED | OUTPATIENT
Start: 2025-09-03

## 2025-09-03 ASSESSMENT — PATIENT HEALTH QUESTIONNAIRE - PHQ9
2. FEELING DOWN, DEPRESSED OR HOPELESS: NOT AT ALL
SUM OF ALL RESPONSES TO PHQ9 QUESTIONS 1 AND 2: 0
1. LITTLE INTEREST OR PLEASURE IN DOING THINGS: NOT AT ALL
1. LITTLE INTEREST OR PLEASURE IN DOING THINGS: NOT AT ALL
SUM OF ALL RESPONSES TO PHQ9 QUESTIONS 1 AND 2: 0
2. FEELING DOWN, DEPRESSED OR HOPELESS: NOT AT ALL

## 2025-09-17 ENCOUNTER — APPOINTMENT (OUTPATIENT)
Dept: OTOLARYNGOLOGY | Facility: CLINIC | Age: 60
End: 2025-09-17
Payer: COMMERCIAL

## 2025-10-22 ENCOUNTER — APPOINTMENT (OUTPATIENT)
Dept: CARDIOLOGY | Facility: CLINIC | Age: 60
End: 2025-10-22
Payer: COMMERCIAL

## 2026-01-12 ENCOUNTER — APPOINTMENT (OUTPATIENT)
Dept: ALLERGY | Facility: CLINIC | Age: 61
End: 2026-01-12
Payer: COMMERCIAL

## 2026-01-20 ENCOUNTER — APPOINTMENT (OUTPATIENT)
Dept: ALLERGY | Facility: CLINIC | Age: 61
End: 2026-01-20
Payer: COMMERCIAL

## (undated) DEVICE — LABEL MED MINI W/ MARKER

## (undated) DEVICE — APPLICATOR MEDICATED 26 CC SOLUTION HI LT ORNG CHLORAPREP

## (undated) DEVICE — C-ARM: Brand: UNBRANDED

## (undated) DEVICE — ZIMMER® STERILE DISPOSABLE TOURNIQUET CUFF, DUAL PORT, SINGLE BLADDER, 18 IN. (46 CM)

## (undated) DEVICE — GAUZE,SPONGE,4"X4",16PLY,XRAY,STRL,LF: Brand: MEDLINE

## (undated) DEVICE — GLOVE ORTHO 7 1/2   MSG9475

## (undated) DEVICE — INTENDED FOR TISSUE SEPARATION, AND OTHER PROCEDURES THAT REQUIRE A SHARP SURGICAL BLADE TO PUNCTURE OR CUT.: Brand: BARD-PARKER ® CARBON RIB-BACK BLADES

## (undated) DEVICE — GLOVE ORANGE PI 7   MSG9070

## (undated) DEVICE — BANDAGE COMPR W3INXL5YD WHT BGE POLY COT M E WRP WV HK AND

## (undated) DEVICE — GLOVE ORANGE PI 7 1/2   MSG9075

## (undated) DEVICE — GOWN,AURORA,NONREINFORCED,LARGE: Brand: MEDLINE

## (undated) DEVICE — Device

## (undated) DEVICE — ADAPTER MON OD15X22MM ID15MM STD LUER FIT W/ LIFESAVER

## (undated) DEVICE — DRESSING PETRO W3XL8IN OIL EMUL N ADH GZ KNIT IMPREG CELOS

## (undated) DEVICE — SPLINT CAST W3XL15IN GRN STRENGTH PLSTR OF PARIS FAST SET

## (undated) DEVICE — SPONGE GZ W4XL4IN COT 12 PLY TYP VII WVN C FLD DSGN

## (undated) DEVICE — CONTAINER,SPECIMEN,OR STERILE,4OZ: Brand: MEDLINE

## (undated) DEVICE — PADDING CAST N ADH 4 YDX3 IN HIGHLY ABSORBENT EZ APPL SOFROL

## (undated) DEVICE — SUTURE TICRON 4-0 BLU 18 IN P-13 NDL SCD3059G

## (undated) DEVICE — COVER LT HNDL BLU PLAS

## (undated) DEVICE — HAND II: Brand: MEDLINE INDUSTRIES, INC.